# Patient Record
Sex: MALE | Race: WHITE | NOT HISPANIC OR LATINO | Employment: OTHER | URBAN - METROPOLITAN AREA
[De-identification: names, ages, dates, MRNs, and addresses within clinical notes are randomized per-mention and may not be internally consistent; named-entity substitution may affect disease eponyms.]

---

## 2023-07-13 ENCOUNTER — TELEPHONE (OUTPATIENT)
Dept: PRIMARY CARE | Facility: CLINIC | Age: 73
End: 2023-07-13
Payer: MEDICARE

## 2023-07-13 DIAGNOSIS — G80.9 CEREBRAL PALSY, UNSPECIFIED TYPE (MULTI): Primary | ICD-10-CM

## 2023-11-08 PROBLEM — D11.9 BASAL CELL ADENOMA: Status: ACTIVE | Noted: 2023-11-08

## 2023-11-08 PROBLEM — G80.9 CEREBRAL PALSY (MULTI): Status: ACTIVE | Noted: 2023-11-08

## 2023-11-08 PROBLEM — M17.11 PRIMARY OSTEOARTHRITIS OF RIGHT KNEE: Status: ACTIVE | Noted: 2023-11-08

## 2023-11-08 PROBLEM — Z85.828 HISTORY OF MOHS MICROGRAPHIC SURGERY FOR SKIN CANCER: Status: ACTIVE | Noted: 2023-11-08

## 2023-11-08 PROBLEM — Z98.890 HISTORY OF MOHS MICROGRAPHIC SURGERY FOR SKIN CANCER: Status: ACTIVE | Noted: 2023-11-08

## 2023-11-08 PROBLEM — M75.21 BICEPS TENDINITIS OF BOTH SHOULDERS: Status: ACTIVE | Noted: 2023-11-08

## 2023-11-08 PROBLEM — J02.9 ACUTE PHARYNGITIS: Status: ACTIVE | Noted: 2023-11-08

## 2023-11-08 PROBLEM — M75.22 BICEPS TENDINITIS OF BOTH SHOULDERS: Status: ACTIVE | Noted: 2023-11-08

## 2023-11-08 PROBLEM — H91.93 BILATERAL HEARING LOSS: Status: ACTIVE | Noted: 2023-11-08

## 2023-11-08 PROBLEM — S01.80XA: Status: ACTIVE | Noted: 2023-11-08

## 2023-11-08 PROBLEM — J34.2 DEVIATED NASAL SEPTUM: Status: ACTIVE | Noted: 2023-11-08

## 2023-11-08 PROBLEM — M54.2 NECK PAIN: Status: ACTIVE | Noted: 2023-11-08

## 2023-11-08 PROBLEM — S02.2XXA CLOSED FRACTURE OF NASAL BONES: Status: ACTIVE | Noted: 2023-11-08

## 2023-11-08 PROBLEM — E78.5 BORDERLINE HYPERLIPIDEMIA: Status: ACTIVE | Noted: 2023-11-08

## 2023-11-08 PROBLEM — M54.50 LOW BACK PAIN: Status: ACTIVE | Noted: 2023-11-08

## 2023-11-08 PROBLEM — M19.041 OSTEOARTHRITIS OF FINGER OF RIGHT HAND: Status: ACTIVE | Noted: 2023-11-08

## 2023-11-09 ENCOUNTER — OFFICE VISIT (OUTPATIENT)
Dept: PRIMARY CARE | Facility: CLINIC | Age: 73
End: 2023-11-09
Payer: MEDICARE

## 2023-11-09 VITALS
SYSTOLIC BLOOD PRESSURE: 132 MMHG | HEIGHT: 68 IN | TEMPERATURE: 97.6 F | DIASTOLIC BLOOD PRESSURE: 78 MMHG | HEART RATE: 70 BPM | BODY MASS INDEX: 17.98 KG/M2 | OXYGEN SATURATION: 98 % | WEIGHT: 118.6 LBS

## 2023-11-09 DIAGNOSIS — N40.0 BENIGN PROSTATIC HYPERPLASIA WITHOUT LOWER URINARY TRACT SYMPTOMS: ICD-10-CM

## 2023-11-09 DIAGNOSIS — Z00.00 MEDICARE ANNUAL WELLNESS VISIT, SUBSEQUENT: Primary | ICD-10-CM

## 2023-11-09 DIAGNOSIS — E78.5 BORDERLINE HYPERLIPIDEMIA: ICD-10-CM

## 2023-11-09 DIAGNOSIS — E46 PROTEIN-CALORIE MALNUTRITION, UNSPECIFIED SEVERITY (MULTI): ICD-10-CM

## 2023-11-09 DIAGNOSIS — G80.9 CEREBRAL PALSY, UNSPECIFIED TYPE (MULTI): ICD-10-CM

## 2023-11-09 LAB
ALBUMIN SERPL BCP-MCNC: 4.4 G/DL (ref 3.4–5)
ALP SERPL-CCNC: 113 U/L (ref 33–136)
ALT SERPL W P-5'-P-CCNC: 23 U/L (ref 10–52)
ANION GAP SERPL CALC-SCNC: 14 MMOL/L (ref 10–20)
AST SERPL W P-5'-P-CCNC: 20 U/L (ref 9–39)
BASOPHILS # BLD AUTO: 0.06 X10*3/UL (ref 0–0.1)
BASOPHILS NFR BLD AUTO: 0.7 %
BILIRUB SERPL-MCNC: 1 MG/DL (ref 0–1.2)
BUN SERPL-MCNC: 15 MG/DL (ref 6–23)
CALCIUM SERPL-MCNC: 10 MG/DL (ref 8.6–10.6)
CHLORIDE SERPL-SCNC: 102 MMOL/L (ref 98–107)
CHOLEST SERPL-MCNC: 173 MG/DL (ref 0–199)
CHOLESTEROL/HDL RATIO: 2.9
CO2 SERPL-SCNC: 30 MMOL/L (ref 21–32)
CREAT SERPL-MCNC: 0.92 MG/DL (ref 0.5–1.3)
EOSINOPHIL # BLD AUTO: 0.13 X10*3/UL (ref 0–0.4)
EOSINOPHIL NFR BLD AUTO: 1.4 %
ERYTHROCYTE [DISTWIDTH] IN BLOOD BY AUTOMATED COUNT: 12.3 % (ref 11.5–14.5)
GFR SERPL CREATININE-BSD FRML MDRD: 87 ML/MIN/1.73M*2
GLUCOSE SERPL-MCNC: 78 MG/DL (ref 74–99)
HCT VFR BLD AUTO: 50.9 % (ref 41–52)
HCV AB SER QL: NONREACTIVE
HDLC SERPL-MCNC: 59.4 MG/DL
HGB BLD-MCNC: 17 G/DL (ref 13.5–17.5)
IMM GRANULOCYTES # BLD AUTO: 0.06 X10*3/UL (ref 0–0.5)
IMM GRANULOCYTES NFR BLD AUTO: 0.7 % (ref 0–0.9)
LDLC SERPL CALC-MCNC: 103 MG/DL
LYMPHOCYTES # BLD AUTO: 2.73 X10*3/UL (ref 0.8–3)
LYMPHOCYTES NFR BLD AUTO: 30.3 %
MCH RBC QN AUTO: 33.1 PG (ref 26–34)
MCHC RBC AUTO-ENTMCNC: 33.4 G/DL (ref 32–36)
MCV RBC AUTO: 99 FL (ref 80–100)
MONOCYTES # BLD AUTO: 0.64 X10*3/UL (ref 0.05–0.8)
MONOCYTES NFR BLD AUTO: 7.1 %
NEUTROPHILS # BLD AUTO: 5.38 X10*3/UL (ref 1.6–5.5)
NEUTROPHILS NFR BLD AUTO: 59.8 %
NON HDL CHOLESTEROL: 114 MG/DL (ref 0–149)
NRBC BLD-RTO: 0 /100 WBCS (ref 0–0)
PLATELET # BLD AUTO: 218 X10*3/UL (ref 150–450)
POTASSIUM SERPL-SCNC: 4.5 MMOL/L (ref 3.5–5.3)
PROT SERPL-MCNC: 7.4 G/DL (ref 6.4–8.2)
PSA SERPL-MCNC: 1.87 NG/ML
RBC # BLD AUTO: 5.14 X10*6/UL (ref 4.5–5.9)
SODIUM SERPL-SCNC: 141 MMOL/L (ref 136–145)
TRIGL SERPL-MCNC: 51 MG/DL (ref 0–149)
VLDL: 10 MG/DL (ref 0–40)
WBC # BLD AUTO: 9 X10*3/UL (ref 4.4–11.3)

## 2023-11-09 PROCEDURE — 1160F RVW MEDS BY RX/DR IN RCRD: CPT | Performed by: FAMILY MEDICINE

## 2023-11-09 PROCEDURE — 81001 URINALYSIS AUTO W/SCOPE: CPT

## 2023-11-09 PROCEDURE — 1125F AMNT PAIN NOTED PAIN PRSNT: CPT | Performed by: FAMILY MEDICINE

## 2023-11-09 PROCEDURE — 1036F TOBACCO NON-USER: CPT | Performed by: FAMILY MEDICINE

## 2023-11-09 PROCEDURE — 84153 ASSAY OF PSA TOTAL: CPT

## 2023-11-09 PROCEDURE — 1170F FXNL STATUS ASSESSED: CPT | Performed by: FAMILY MEDICINE

## 2023-11-09 PROCEDURE — 99397 PER PM REEVAL EST PAT 65+ YR: CPT | Performed by: FAMILY MEDICINE

## 2023-11-09 PROCEDURE — 80053 COMPREHEN METABOLIC PANEL: CPT

## 2023-11-09 PROCEDURE — 99497 ADVNCD CARE PLAN 30 MIN: CPT | Performed by: FAMILY MEDICINE

## 2023-11-09 PROCEDURE — 80061 LIPID PANEL: CPT

## 2023-11-09 PROCEDURE — G0439 PPPS, SUBSEQ VISIT: HCPCS | Performed by: FAMILY MEDICINE

## 2023-11-09 PROCEDURE — 86803 HEPATITIS C AB TEST: CPT

## 2023-11-09 PROCEDURE — 1159F MED LIST DOCD IN RCRD: CPT | Performed by: FAMILY MEDICINE

## 2023-11-09 PROCEDURE — 85025 COMPLETE CBC W/AUTO DIFF WBC: CPT

## 2023-11-09 PROCEDURE — 1158F ADVNC CARE PLAN TLK DOCD: CPT | Performed by: FAMILY MEDICINE

## 2023-11-09 PROCEDURE — 36415 COLL VENOUS BLD VENIPUNCTURE: CPT

## 2023-11-09 RX ORDER — SYRINGE-NEEDLE,INSULIN,0.5 ML 28GX1/2"
SYRINGE, EMPTY DISPOSABLE MISCELLANEOUS
COMMUNITY
Start: 2023-10-19 | End: 2023-11-09 | Stop reason: WASHOUT

## 2023-11-09 RX ORDER — BENZONATATE 200 MG/1
CAPSULE ORAL
COMMUNITY
Start: 2023-10-19 | End: 2023-11-09 | Stop reason: WASHOUT

## 2023-11-09 SDOH — ECONOMIC STABILITY: FOOD INSECURITY: WITHIN THE PAST 12 MONTHS, THE FOOD YOU BOUGHT JUST DIDN'T LAST AND YOU DIDN'T HAVE MONEY TO GET MORE.: NEVER TRUE

## 2023-11-09 SDOH — ECONOMIC STABILITY: HOUSING INSECURITY
IN THE LAST 12 MONTHS, WAS THERE A TIME WHEN YOU DID NOT HAVE A STEADY PLACE TO SLEEP OR SLEPT IN A SHELTER (INCLUDING NOW)?: NO

## 2023-11-09 SDOH — ECONOMIC STABILITY: FOOD INSECURITY: WITHIN THE PAST 12 MONTHS, YOU WORRIED THAT YOUR FOOD WOULD RUN OUT BEFORE YOU GOT MONEY TO BUY MORE.: NEVER TRUE

## 2023-11-09 SDOH — ECONOMIC STABILITY: INCOME INSECURITY: IN THE LAST 12 MONTHS, WAS THERE A TIME WHEN YOU WERE NOT ABLE TO PAY THE MORTGAGE OR RENT ON TIME?: NO

## 2023-11-09 SDOH — ECONOMIC STABILITY: TRANSPORTATION INSECURITY
IN THE PAST 12 MONTHS, HAS LACK OF TRANSPORTATION KEPT YOU FROM MEETINGS, WORK, OR FROM GETTING THINGS NEEDED FOR DAILY LIVING?: NO

## 2023-11-09 SDOH — ECONOMIC STABILITY: TRANSPORTATION INSECURITY
IN THE PAST 12 MONTHS, HAS THE LACK OF TRANSPORTATION KEPT YOU FROM MEDICAL APPOINTMENTS OR FROM GETTING MEDICATIONS?: NO

## 2023-11-09 ASSESSMENT — ANXIETY QUESTIONNAIRES
6. BECOMING EASILY ANNOYED OR IRRITABLE: NOT AT ALL
2. NOT BEING ABLE TO STOP OR CONTROL WORRYING: NOT AT ALL
7. FEELING AFRAID AS IF SOMETHING AWFUL MIGHT HAPPEN: NOT AT ALL
5. BEING SO RESTLESS THAT IT IS HARD TO SIT STILL: NOT AT ALL
3. WORRYING TOO MUCH ABOUT DIFFERENT THINGS: NOT AT ALL
GAD7 TOTAL SCORE: 0
1. FEELING NERVOUS, ANXIOUS, OR ON EDGE: NOT AT ALL
4. TROUBLE RELAXING: NOT AT ALL

## 2023-11-09 ASSESSMENT — ACTIVITIES OF DAILY LIVING (ADL)
FEEDING YOURSELF: INDEPENDENT
JUDGMENT_ADEQUATE_SAFELY_COMPLETE_DAILY_ACTIVITIES: YES
PATIENT'S MEMORY ADEQUATE TO SAFELY COMPLETE DAILY ACTIVITIES?: YES
PREPARING MEALS: INDEPENDENT
USING TELEPHONE: INDEPENDENT
ADEQUATE_TO_COMPLETE_ADL: YES
TOILETING: INDEPENDENT
DRESSING: INDEPENDENT
BATHING: INDEPENDENT
GROOMING: INDEPENDENT
USING TRANSPORTATION: INDEPENDENT
FEEDING: INDEPENDENT
DOING HOUSEWORK: INDEPENDENT
EATING: INDEPENDENT
JUDGMENT_ADEQUATE_SAFELY_COMPLETE_DAILY_ACTIVITIES: YES
ADEQUATE_TO_COMPLETE_ADL: YES
TAKING MEDICATION: INDEPENDENT
NEEDS ASSISTANCE WITH FOOD: INDEPENDENT
TOILETING: INDEPENDENT
MANAGING FINANCES: INDEPENDENT
BATHING: INDEPENDENT
STIL DRIVING: YES
GROCERY SHOPPING: INDEPENDENT
DRESSING YOURSELF: INDEPENDENT
WALKS IN HOME: INDEPENDENT

## 2023-11-09 ASSESSMENT — ENCOUNTER SYMPTOMS
CONSTITUTIONAL NEGATIVE: 1
NEUROLOGICAL NEGATIVE: 1
RESPIRATORY NEGATIVE: 1
LOSS OF SENSATION IN FEET: 0
MUSCULOSKELETAL NEGATIVE: 1
NERVOUS/ANXIOUS: 1
ENDOCRINE NEGATIVE: 1
DEPRESSION: 0
OCCASIONAL FEELINGS OF UNSTEADINESS: 0
CARDIOVASCULAR NEGATIVE: 1
GASTROINTESTINAL NEGATIVE: 1

## 2023-11-09 ASSESSMENT — SOCIAL DETERMINANTS OF HEALTH (SDOH)
WITHIN THE LAST YEAR, HAVE TO BEEN RAPED OR FORCED TO HAVE ANY KIND OF SEXUAL ACTIVITY BY YOUR PARTNER OR EX-PARTNER?: NO
WITHIN THE LAST YEAR, HAVE YOU BEEN HUMILIATED OR EMOTIONALLY ABUSED IN OTHER WAYS BY YOUR PARTNER OR EX-PARTNER?: NO
HOW HARD IS IT FOR YOU TO PAY FOR THE VERY BASICS LIKE FOOD, HOUSING, MEDICAL CARE, AND HEATING?: NOT HARD AT ALL
WITHIN THE LAST YEAR, HAVE YOU BEEN AFRAID OF YOUR PARTNER OR EX-PARTNER?: NO
WITHIN THE LAST YEAR, HAVE YOU BEEN KICKED, HIT, SLAPPED, OR OTHERWISE PHYSICALLY HURT BY YOUR PARTNER OR EX-PARTNER?: NO
IN THE PAST 12 MONTHS, HAS THE ELECTRIC, GAS, OIL, OR WATER COMPANY THREATENED TO SHUT OFF SERVICE IN YOUR HOME?: NO

## 2023-11-09 ASSESSMENT — GERIATRIC MINI NUTRITIONAL ASSESSMENT (MNA)
D HAS SUFFERED PSYCHOLOGICAL STRESS OR ACUTE DISEASE IN THE PAST 3 MONTHS?: NO
E NEUROPSYCHOLOGICAL PROBLEMS: NO PSYCHOLOGICAL PROBLEMS
C GENERAL MOBILITY: GOES OUT
B WEIGHT LOSS DURING THE LAST 3 MONTHS: NO WEIGHT LOSS
A HAS FOOD INTAKE DECLINED OVER THE PAST 3 MONTHS DUE TO LOSS OF APPETITE, DIGESTIVE PROBLEMS, CHEWING OR SWALLOWING DIFFICULTIES?: NO DECREASE IN FOOD INTAKE

## 2023-11-09 ASSESSMENT — PATIENT HEALTH QUESTIONNAIRE - PHQ9
2. FEELING DOWN, DEPRESSED OR HOPELESS: NOT AT ALL
1. LITTLE INTEREST OR PLEASURE IN DOING THINGS: NOT AT ALL
SUM OF ALL RESPONSES TO PHQ9 QUESTIONS 1 & 2: 0

## 2023-11-09 ASSESSMENT — LIFESTYLE VARIABLES
HOW OFTEN DO YOU HAVE SIX OR MORE DRINKS ON ONE OCCASION: NEVER
SKIP TO QUESTIONS 9-10: 1
HOW MANY STANDARD DRINKS CONTAINING ALCOHOL DO YOU HAVE ON A TYPICAL DAY: PATIENT DOES NOT DRINK
HOW OFTEN DO YOU HAVE A DRINK CONTAINING ALCOHOL: NEVER
AUDIT-C TOTAL SCORE: 0

## 2023-11-09 ASSESSMENT — PAIN SCALES - GENERAL: PAINLEVEL: 3

## 2023-11-09 NOTE — ACP (ADVANCE CARE PLANNING)
Confirming Previous Code Status:   Advance Care Planning Note     Discussion Date: 11/09/23   Discussion Participants: patient    The patient wishes to discuss Advance Care Planning today and the following is a brief summary of our discussion.     Patient has capacity to make their own medical decisions: Yes  Health Care Agent/Surrogate Decision Maker documented in chart: Yes    Documents on file and valid:  Advance Directive/Living Will: Yes   Health Care Power of : Yes  Other: none    Communication of Medical Status/Prognosis:   yes     Communication of Treatment Goals/Options:   no     Treatment Decisions  Goals of Care: survival is paramount regardless of prognosis, treatment outcome, or burden   yes  Follow Up Plan  no  Team Members  myself  Time Statement: Total face to face time spent on advance care planning was 16 minutes with 16 minutes spent in counseling, including the explanation.    Dewey Cruz,   11/9/2023 1:10 PM

## 2023-11-09 NOTE — PROGRESS NOTES
"Subjective   Patient ID: Josh Kessler is a 73 y.o. male who presents for Annual Exam (Annual medicare wellness fasting bw).    HPI     Review of Systems   Constitutional: Negative.    HENT: Negative.     Respiratory: Negative.     Cardiovascular: Negative.    Gastrointestinal: Negative.    Endocrine: Negative.    Genitourinary: Negative.    Musculoskeletal: Negative.    Neurological: Negative.         CP   Psychiatric/Behavioral:  The patient is nervous/anxious.         Wife         Objective   /78 (BP Location: Left arm)   Pulse 70   Temp 36.4 °C (97.6 °F) (Temporal)   Ht 1.727 m (5' 8\")   Wt 53.8 kg (118 lb 9.6 oz)   SpO2 98%   BMI 18.03 kg/m²     Physical Exam  Vitals and nursing note reviewed.   HENT:      Right Ear: Tympanic membrane normal.      Left Ear: Tympanic membrane normal.      Ears:      Comments: Bilat hearing aids     Mouth/Throat:      Pharynx: Oropharynx is clear.   Cardiovascular:      Rate and Rhythm: Normal rate and regular rhythm.      Pulses: Normal pulses.      Heart sounds: Normal heart sounds.   Pulmonary:      Breath sounds: Normal breath sounds.   Abdominal:      Palpations: Abdomen is soft.   Musculoskeletal:         General: Normal range of motion.   Neurological:      Mental Status: He is oriented to person, place, and time.   Psychiatric:         Mood and Affect: Mood normal.         Behavior: Behavior normal.         Assessment/Plan patient seen here for an annual Medicare wellness exam.  We reviewed his questionnaire he is agreeable to his responses.  We did discuss advanced directives.  He does have some increased anxiety lately since his wife passed away this past September.  We will see him back in a year  Problem List Items Addressed This Visit             ICD-10-CM    Borderline hyperlipidemia E78.5    Relevant Orders    Lipid Panel    CBC and Auto Differential    Comprehensive Metabolic Panel    Microscopic Only, Urine    Cerebral palsy (CMS/[facility]) G80.9 "    Protein-calorie malnutrition, unspecified severity (CMS/HCC) E46     Other Visit Diagnoses         Codes    Medicare annual wellness visit, subsequent    -  Primary Z00.00    Relevant Orders    Hepatitis C antibody    Benign prostatic hyperplasia without lower urinary tract symptoms     N40.0    Relevant Orders    Prostate Specific Antigen

## 2023-11-10 LAB
RBC #/AREA URNS AUTO: NORMAL /HPF
WBC #/AREA URNS AUTO: NORMAL /HPF

## 2024-01-22 ENCOUNTER — APPOINTMENT (OUTPATIENT)
Dept: RADIOLOGY | Facility: HOSPITAL | Age: 74
DRG: 065 | End: 2024-01-22
Payer: MEDICARE

## 2024-01-22 ENCOUNTER — HOSPITAL ENCOUNTER (INPATIENT)
Facility: HOSPITAL | Age: 74
LOS: 2 days | Discharge: HOME | DRG: 065 | End: 2024-01-24
Attending: STUDENT IN AN ORGANIZED HEALTH CARE EDUCATION/TRAINING PROGRAM | Admitting: STUDENT IN AN ORGANIZED HEALTH CARE EDUCATION/TRAINING PROGRAM
Payer: MEDICARE

## 2024-01-22 ENCOUNTER — APPOINTMENT (OUTPATIENT)
Dept: CARDIOLOGY | Facility: HOSPITAL | Age: 74
DRG: 065 | End: 2024-01-22
Payer: MEDICARE

## 2024-01-22 DIAGNOSIS — I63.89 OTHER CEREBRAL INFARCTION (MULTI): ICD-10-CM

## 2024-01-22 DIAGNOSIS — I63.9 CEREBROVASCULAR ACCIDENT (CVA), UNSPECIFIED MECHANISM (MULTI): Primary | ICD-10-CM

## 2024-01-22 DIAGNOSIS — G45.9 TRANSIENT CEREBRAL ISCHEMIA, UNSPECIFIED TYPE: ICD-10-CM

## 2024-01-22 DIAGNOSIS — I48.0 AF (PAROXYSMAL ATRIAL FIBRILLATION) (MULTI): ICD-10-CM

## 2024-01-22 LAB
ALBUMIN SERPL BCP-MCNC: 4.5 G/DL (ref 3.4–5)
ALP SERPL-CCNC: 105 U/L (ref 33–136)
ALT SERPL W P-5'-P-CCNC: 26 U/L (ref 10–52)
ANION GAP SERPL CALC-SCNC: 11 MMOL/L (ref 10–20)
APTT PPP: 29 SECONDS (ref 27–38)
AST SERPL W P-5'-P-CCNC: 22 U/L (ref 9–39)
BASOPHILS # BLD AUTO: 0.07 X10*3/UL (ref 0–0.1)
BASOPHILS NFR BLD AUTO: 0.7 %
BILIRUB SERPL-MCNC: 1 MG/DL (ref 0–1.2)
BUN SERPL-MCNC: 21 MG/DL (ref 6–23)
CALCIUM SERPL-MCNC: 9.8 MG/DL (ref 8.6–10.3)
CARDIAC TROPONIN I PNL SERPL HS: 6 NG/L (ref 0–20)
CHLORIDE SERPL-SCNC: 101 MMOL/L (ref 98–107)
CHOLEST SERPL-MCNC: 171 MG/DL (ref 0–199)
CHOLESTEROL/HDL RATIO: 3
CO2 SERPL-SCNC: 29 MMOL/L (ref 21–32)
CREAT SERPL-MCNC: 1.06 MG/DL (ref 0.5–1.3)
EGFRCR SERPLBLD CKD-EPI 2021: 73 ML/MIN/1.73M*2
EOSINOPHIL # BLD AUTO: 0.08 X10*3/UL (ref 0–0.4)
EOSINOPHIL NFR BLD AUTO: 0.8 %
ERYTHROCYTE [DISTWIDTH] IN BLOOD BY AUTOMATED COUNT: 11.9 % (ref 11.5–14.5)
EST. AVERAGE GLUCOSE BLD GHB EST-MCNC: 97 MG/DL
GLUCOSE BLD MANUAL STRIP-MCNC: 89 MG/DL (ref 74–99)
GLUCOSE SERPL-MCNC: 134 MG/DL (ref 74–99)
HBA1C MFR BLD: 5 %
HCT VFR BLD AUTO: 47.5 % (ref 41–52)
HDLC SERPL-MCNC: 56.5 MG/DL
HGB BLD-MCNC: 16.4 G/DL (ref 13.5–17.5)
IMM GRANULOCYTES # BLD AUTO: 0.01 X10*3/UL (ref 0–0.5)
IMM GRANULOCYTES NFR BLD AUTO: 0.1 % (ref 0–0.9)
INR PPP: 1 (ref 0.9–1.1)
LDLC SERPL CALC-MCNC: 102 MG/DL
LYMPHOCYTES # BLD AUTO: 1.85 X10*3/UL (ref 0.8–3)
LYMPHOCYTES NFR BLD AUTO: 19 %
MCH RBC QN AUTO: 33.4 PG (ref 26–34)
MCHC RBC AUTO-ENTMCNC: 34.5 G/DL (ref 32–36)
MCV RBC AUTO: 97 FL (ref 80–100)
MONOCYTES # BLD AUTO: 0.78 X10*3/UL (ref 0.05–0.8)
MONOCYTES NFR BLD AUTO: 8 %
NEUTROPHILS # BLD AUTO: 6.96 X10*3/UL (ref 1.6–5.5)
NEUTROPHILS NFR BLD AUTO: 71.4 %
NON HDL CHOLESTEROL: 115 MG/DL (ref 0–149)
NRBC BLD-RTO: 0 /100 WBCS (ref 0–0)
PLATELET # BLD AUTO: 205 X10*3/UL (ref 150–450)
POTASSIUM SERPL-SCNC: 3.8 MMOL/L (ref 3.5–5.3)
PROT SERPL-MCNC: 7.4 G/DL (ref 6.4–8.2)
PROTHROMBIN TIME: 11.6 SECONDS (ref 9.8–12.8)
RBC # BLD AUTO: 4.91 X10*6/UL (ref 4.5–5.9)
SODIUM SERPL-SCNC: 137 MMOL/L (ref 136–145)
TRIGL SERPL-MCNC: 65 MG/DL (ref 0–149)
VLDL: 13 MG/DL (ref 0–40)
WBC # BLD AUTO: 9.8 X10*3/UL (ref 4.4–11.3)

## 2024-01-22 PROCEDURE — 71045 X-RAY EXAM CHEST 1 VIEW: CPT | Mod: FR

## 2024-01-22 PROCEDURE — 70544 MR ANGIOGRAPHY HEAD W/O DYE: CPT | Performed by: STUDENT IN AN ORGANIZED HEALTH CARE EDUCATION/TRAINING PROGRAM

## 2024-01-22 PROCEDURE — 99223 1ST HOSP IP/OBS HIGH 75: CPT | Performed by: STUDENT IN AN ORGANIZED HEALTH CARE EDUCATION/TRAINING PROGRAM

## 2024-01-22 PROCEDURE — 84484 ASSAY OF TROPONIN QUANT: CPT | Performed by: STUDENT IN AN ORGANIZED HEALTH CARE EDUCATION/TRAINING PROGRAM

## 2024-01-22 PROCEDURE — 70551 MRI BRAIN STEM W/O DYE: CPT | Performed by: STUDENT IN AN ORGANIZED HEALTH CARE EDUCATION/TRAINING PROGRAM

## 2024-01-22 PROCEDURE — 2500000001 HC RX 250 WO HCPCS SELF ADMINISTERED DRUGS (ALT 637 FOR MEDICARE OP): Performed by: NURSE PRACTITIONER

## 2024-01-22 PROCEDURE — 70547 MR ANGIOGRAPHY NECK W/O DYE: CPT | Performed by: STUDENT IN AN ORGANIZED HEALTH CARE EDUCATION/TRAINING PROGRAM

## 2024-01-22 PROCEDURE — 2500000004 HC RX 250 GENERAL PHARMACY W/ HCPCS (ALT 636 FOR OP/ED)

## 2024-01-22 PROCEDURE — 2060000001 HC INTERMEDIATE ICU ROOM DAILY

## 2024-01-22 PROCEDURE — 83036 HEMOGLOBIN GLYCOSYLATED A1C: CPT

## 2024-01-22 PROCEDURE — 70547 MR ANGIOGRAPHY NECK W/O DYE: CPT | Mod: 59

## 2024-01-22 PROCEDURE — 82947 ASSAY GLUCOSE BLOOD QUANT: CPT

## 2024-01-22 PROCEDURE — 70551 MRI BRAIN STEM W/O DYE: CPT

## 2024-01-22 PROCEDURE — 99223 1ST HOSP IP/OBS HIGH 75: CPT | Performed by: PSYCHIATRY & NEUROLOGY

## 2024-01-22 PROCEDURE — 80053 COMPREHEN METABOLIC PANEL: CPT | Performed by: STUDENT IN AN ORGANIZED HEALTH CARE EDUCATION/TRAINING PROGRAM

## 2024-01-22 PROCEDURE — 70450 CT HEAD/BRAIN W/O DYE: CPT | Performed by: RADIOLOGY

## 2024-01-22 PROCEDURE — 99285 EMERGENCY DEPT VISIT HI MDM: CPT | Performed by: STUDENT IN AN ORGANIZED HEALTH CARE EDUCATION/TRAINING PROGRAM

## 2024-01-22 PROCEDURE — 99497 ADVNCD CARE PLAN 30 MIN: CPT | Performed by: STUDENT IN AN ORGANIZED HEALTH CARE EDUCATION/TRAINING PROGRAM

## 2024-01-22 PROCEDURE — 80061 LIPID PANEL: CPT

## 2024-01-22 PROCEDURE — 72125 CT NECK SPINE W/O DYE: CPT

## 2024-01-22 PROCEDURE — 70544 MR ANGIOGRAPHY HEAD W/O DYE: CPT | Mod: 59

## 2024-01-22 PROCEDURE — 85610 PROTHROMBIN TIME: CPT | Performed by: STUDENT IN AN ORGANIZED HEALTH CARE EDUCATION/TRAINING PROGRAM

## 2024-01-22 PROCEDURE — 85025 COMPLETE CBC W/AUTO DIFF WBC: CPT | Performed by: STUDENT IN AN ORGANIZED HEALTH CARE EDUCATION/TRAINING PROGRAM

## 2024-01-22 PROCEDURE — 36415 COLL VENOUS BLD VENIPUNCTURE: CPT | Performed by: STUDENT IN AN ORGANIZED HEALTH CARE EDUCATION/TRAINING PROGRAM

## 2024-01-22 PROCEDURE — 93005 ELECTROCARDIOGRAM TRACING: CPT

## 2024-01-22 PROCEDURE — 70450 CT HEAD/BRAIN W/O DYE: CPT

## 2024-01-22 PROCEDURE — 85730 THROMBOPLASTIN TIME PARTIAL: CPT | Performed by: STUDENT IN AN ORGANIZED HEALTH CARE EDUCATION/TRAINING PROGRAM

## 2024-01-22 PROCEDURE — 2500000001 HC RX 250 WO HCPCS SELF ADMINISTERED DRUGS (ALT 637 FOR MEDICARE OP): Performed by: STUDENT IN AN ORGANIZED HEALTH CARE EDUCATION/TRAINING PROGRAM

## 2024-01-22 PROCEDURE — 72125 CT NECK SPINE W/O DYE: CPT | Performed by: RADIOLOGY

## 2024-01-22 RX ORDER — NAPROXEN SODIUM 220 MG/1
81 TABLET, FILM COATED ORAL DAILY
Status: DISCONTINUED | OUTPATIENT
Start: 2024-01-22 | End: 2024-01-24 | Stop reason: HOSPADM

## 2024-01-22 RX ORDER — HYDRALAZINE HYDROCHLORIDE 20 MG/ML
10 INJECTION INTRAMUSCULAR; INTRAVENOUS
Status: DISCONTINUED | OUTPATIENT
Start: 2024-01-22 | End: 2024-01-22

## 2024-01-22 RX ORDER — ATORVASTATIN CALCIUM 40 MG/1
40 TABLET, FILM COATED ORAL NIGHTLY
Status: DISCONTINUED | OUTPATIENT
Start: 2024-01-22 | End: 2024-01-24 | Stop reason: HOSPADM

## 2024-01-22 RX ORDER — ACETAMINOPHEN 325 MG/1
650 TABLET ORAL EVERY 6 HOURS PRN
Status: DISCONTINUED | OUTPATIENT
Start: 2024-01-22 | End: 2024-01-24 | Stop reason: HOSPADM

## 2024-01-22 RX ORDER — CLOPIDOGREL BISULFATE 75 MG/1
75 TABLET ORAL DAILY
Status: DISCONTINUED | OUTPATIENT
Start: 2024-01-23 | End: 2024-01-22

## 2024-01-22 RX ORDER — HYDRALAZINE HYDROCHLORIDE 25 MG/1
25 TABLET, FILM COATED ORAL EVERY 6 HOURS PRN
Status: DISCONTINUED | OUTPATIENT
Start: 2024-01-24 | End: 2024-01-22

## 2024-01-22 RX ORDER — NAPROXEN SODIUM 220 MG/1
81 TABLET, FILM COATED ORAL DAILY
Status: DISCONTINUED | OUTPATIENT
Start: 2024-01-23 | End: 2024-01-22

## 2024-01-22 RX ORDER — ONDANSETRON HYDROCHLORIDE 2 MG/ML
4 INJECTION, SOLUTION INTRAVENOUS EVERY 6 HOURS PRN
Status: DISCONTINUED | OUTPATIENT
Start: 2024-01-22 | End: 2024-01-24 | Stop reason: HOSPADM

## 2024-01-22 RX ORDER — LABETALOL HYDROCHLORIDE 5 MG/ML
10 INJECTION, SOLUTION INTRAVENOUS EVERY 10 MIN PRN
Status: DISCONTINUED | OUTPATIENT
Start: 2024-01-22 | End: 2024-01-22

## 2024-01-22 RX ORDER — CLOPIDOGREL BISULFATE 75 MG/1
75 TABLET ORAL DAILY
Status: DISCONTINUED | OUTPATIENT
Start: 2024-01-22 | End: 2024-01-24 | Stop reason: HOSPADM

## 2024-01-22 RX ORDER — ENOXAPARIN SODIUM 100 MG/ML
40 INJECTION SUBCUTANEOUS EVERY 24 HOURS
Status: DISCONTINUED | OUTPATIENT
Start: 2024-01-22 | End: 2024-01-24 | Stop reason: HOSPADM

## 2024-01-22 RX ORDER — NAPROXEN SODIUM 220 MG/1
324 TABLET, FILM COATED ORAL ONCE
Status: DISCONTINUED | OUTPATIENT
Start: 2024-01-22 | End: 2024-01-22

## 2024-01-22 RX ORDER — NAPROXEN SODIUM 220 MG/1
81 TABLET, FILM COATED ORAL ONCE
Status: DISCONTINUED | OUTPATIENT
Start: 2024-01-22 | End: 2024-01-22

## 2024-01-22 RX ORDER — CLOPIDOGREL BISULFATE 75 MG/1
300 TABLET ORAL ONCE
Status: DISCONTINUED | OUTPATIENT
Start: 2024-01-22 | End: 2024-01-22

## 2024-01-22 RX ORDER — ROSUVASTATIN CALCIUM 40 MG/1
40 TABLET, COATED ORAL DAILY
Status: DISCONTINUED | OUTPATIENT
Start: 2024-01-22 | End: 2024-01-22

## 2024-01-22 RX ORDER — CLOPIDOGREL BISULFATE 75 MG/1
75 TABLET ORAL DAILY
Status: DISCONTINUED | OUTPATIENT
Start: 2024-01-22 | End: 2024-01-22

## 2024-01-22 RX ADMIN — ENOXAPARIN SODIUM 40 MG: 40 INJECTION SUBCUTANEOUS at 16:13

## 2024-01-22 RX ADMIN — ASPIRIN 81 MG 81 MG: 81 TABLET ORAL at 16:15

## 2024-01-22 RX ADMIN — CLOPIDOGREL 75 MG: 75 TABLET ORAL at 16:13

## 2024-01-22 RX ADMIN — ATORVASTATIN CALCIUM 40 MG: 40 TABLET, FILM COATED ORAL at 20:33

## 2024-01-22 SDOH — SOCIAL STABILITY: SOCIAL INSECURITY: WERE YOU ABLE TO COMPLETE ALL THE BEHAVIORAL HEALTH SCREENINGS?: YES

## 2024-01-22 SDOH — SOCIAL STABILITY: SOCIAL INSECURITY: HAVE YOU HAD THOUGHTS OF HARMING ANYONE ELSE?: NO

## 2024-01-22 SDOH — SOCIAL STABILITY: SOCIAL INSECURITY: ARE THERE ANY APPARENT SIGNS OF INJURIES/BEHAVIORS THAT COULD BE RELATED TO ABUSE/NEGLECT?: NO

## 2024-01-22 SDOH — SOCIAL STABILITY: SOCIAL INSECURITY: DO YOU FEEL ANYONE HAS EXPLOITED OR TAKEN ADVANTAGE OF YOU FINANCIALLY OR OF YOUR PERSONAL PROPERTY?: NO

## 2024-01-22 SDOH — SOCIAL STABILITY: SOCIAL INSECURITY: ARE YOU OR HAVE YOU BEEN THREATENED OR ABUSED PHYSICALLY, EMOTIONALLY, OR SEXUALLY BY ANYONE?: NO

## 2024-01-22 SDOH — SOCIAL STABILITY: SOCIAL INSECURITY: HAS ANYONE EVER THREATENED TO HURT YOUR FAMILY OR YOUR PETS?: NO

## 2024-01-22 SDOH — SOCIAL STABILITY: SOCIAL INSECURITY: ABUSE: ADULT

## 2024-01-22 SDOH — SOCIAL STABILITY: SOCIAL INSECURITY: DO YOU FEEL UNSAFE GOING BACK TO THE PLACE WHERE YOU ARE LIVING?: NO

## 2024-01-22 SDOH — SOCIAL STABILITY: SOCIAL INSECURITY: DOES ANYONE TRY TO KEEP YOU FROM HAVING/CONTACTING OTHER FRIENDS OR DOING THINGS OUTSIDE YOUR HOME?: NO

## 2024-01-22 ASSESSMENT — ACTIVITIES OF DAILY LIVING (ADL)
JUDGMENT_ADEQUATE_SAFELY_COMPLETE_DAILY_ACTIVITIES: YES
WALKS IN HOME: NEEDS ASSISTANCE
FEEDING YOURSELF: INDEPENDENT
ADEQUATE_TO_COMPLETE_ADL: YES
LACK_OF_TRANSPORTATION: NO
GROOMING: INDEPENDENT
DRESSING YOURSELF: INDEPENDENT
ASSISTIVE_DEVICE: WALKER
TOILETING: NEEDS ASSISTANCE
PATIENT'S MEMORY ADEQUATE TO SAFELY COMPLETE DAILY ACTIVITIES?: YES
HEARING - RIGHT EAR: FUNCTIONAL
BATHING: NEEDS ASSISTANCE
HEARING - LEFT EAR: FUNCTIONAL

## 2024-01-22 ASSESSMENT — LIFESTYLE VARIABLES
SUBSTANCE_ABUSE_PAST_12_MONTHS: NO
HOW OFTEN DO YOU HAVE A DRINK CONTAINING ALCOHOL: NEVER
AUDIT-C TOTAL SCORE: 0
PRESCIPTION_ABUSE_PAST_12_MONTHS: NO
HOW OFTEN DO YOU HAVE 6 OR MORE DRINKS ON ONE OCCASION: NEVER
HOW MANY STANDARD DRINKS CONTAINING ALCOHOL DO YOU HAVE ON A TYPICAL DAY: PATIENT DOES NOT DRINK
AUDIT-C TOTAL SCORE: 0
SKIP TO QUESTIONS 9-10: 1

## 2024-01-22 ASSESSMENT — COLUMBIA-SUICIDE SEVERITY RATING SCALE - C-SSRS
6. HAVE YOU EVER DONE ANYTHING, STARTED TO DO ANYTHING, OR PREPARED TO DO ANYTHING TO END YOUR LIFE?: NO
1. IN THE PAST MONTH, HAVE YOU WISHED YOU WERE DEAD OR WISHED YOU COULD GO TO SLEEP AND NOT WAKE UP?: NO
1. IN THE PAST MONTH, HAVE YOU WISHED YOU WERE DEAD OR WISHED YOU COULD GO TO SLEEP AND NOT WAKE UP?: NO
2. HAVE YOU ACTUALLY HAD ANY THOUGHTS OF KILLING YOURSELF?: NO
6. HAVE YOU EVER DONE ANYTHING, STARTED TO DO ANYTHING, OR PREPARED TO DO ANYTHING TO END YOUR LIFE?: NO
2. HAVE YOU ACTUALLY HAD ANY THOUGHTS OF KILLING YOURSELF?: NO

## 2024-01-22 ASSESSMENT — COGNITIVE AND FUNCTIONAL STATUS - GENERAL
CLIMB 3 TO 5 STEPS WITH RAILING: A LOT
DAILY ACTIVITIY SCORE: 23
MOVING TO AND FROM BED TO CHAIR: A LITTLE
CLIMB 3 TO 5 STEPS WITH RAILING: A LOT
STANDING UP FROM CHAIR USING ARMS: A LITTLE
DAILY ACTIVITIY SCORE: 22
PATIENT BASELINE BEDBOUND: NO
MOBILITY SCORE: 19
STANDING UP FROM CHAIR USING ARMS: A LITTLE
WALKING IN HOSPITAL ROOM: A LITTLE
HELP NEEDED FOR BATHING: A LITTLE
TOILETING: A LITTLE
WALKING IN HOSPITAL ROOM: A LOT
MOVING TO AND FROM BED TO CHAIR: A LITTLE
MOBILITY SCORE: 18
TOILETING: A LITTLE

## 2024-01-22 ASSESSMENT — PATIENT HEALTH QUESTIONNAIRE - PHQ9
1. LITTLE INTEREST OR PLEASURE IN DOING THINGS: NOT AT ALL
SUM OF ALL RESPONSES TO PHQ9 QUESTIONS 1 & 2: 0
2. FEELING DOWN, DEPRESSED OR HOPELESS: NOT AT ALL

## 2024-01-22 ASSESSMENT — PAIN SCALES - GENERAL
PAINLEVEL_OUTOF10: 0 - NO PAIN
PAINLEVEL_OUTOF10: 0 - NO PAIN

## 2024-01-22 ASSESSMENT — PAIN - FUNCTIONAL ASSESSMENT: PAIN_FUNCTIONAL_ASSESSMENT: 0-10

## 2024-01-22 NOTE — ED TRIAGE NOTES
Patient arrives from home with complaints of weakness to his right leg that comes and goes for the past week or so.  Patient states he has had multiple falls since then .  Patient also complains of slight numbness to his right thumb and side of face just by the corner of his lip.  Patient states he has a history of cerebral palsy as well.

## 2024-01-22 NOTE — ED PROVIDER NOTES
EMERGENCY DEPARTMENT ENCOUNTER      Pt Name: Josh Kessler  MRN: [id number]  Birthdate 1950  Date of evaluation: 1/22/2024  Provider: Andre Tatum DO    CHIEF COMPLAINT       Chief Complaint   Patient presents with    Tingling     Patient arrives from home with complaints of weakness to his right leg that comes and goes for the past week or so.  Patient states he has had multiple falls since then .  Patient also complains of slight numbness to his right thumb and side of face just by the corner of his lip.  Patient states he has a history of cerebral palsy as well.       HISTORY OF PRESENT ILLNESS    Josh Kessler is a 73 y.o. male who presents to the emergency department with Himself for multiple medical complaints.  He reports that he did have the worst headache of his life around [place] this resolved after a few days he currently does not have a headache.  At that time he was having flashes in both eyes as well.  This is also resolved.  He reports over the past week he has been having waxing and waning right lower extremity weakness as well as tingling throughout the right upper and right lower extremity and right facial structures.  He states the only current active symptoms are the tingling throughout the right side that has been ongoing since yesterday morning.  Denies any further associated symptoms at this time.  He does have a longstanding history of cerebral palsy which leaves him with right-sided weakness although this was unusual compared to his baseline.  States that he has been having regular falls at home secondary to the symptoms nothing in the past 24 hours.          Nursing Notes were reviewed.    REVIEW OF SYSTEMS     CONSTITUTIONAL: Denies fever, sweats, chills.   NEURO: Endorses weakness, numbness, tingling.  Denies headache.   HEENT: Denies sore throat, rhinorrhea, changes in vision.   CARDIO: Denies chest pain, palpitations.  PULM: Denies shortness of breath, cough.   GI: Denies  abdominal pain, nausea, vomiting, diarrhea, constipation, melena, hematochezia.  : Denies painful urination, frequency, hematuria.   MSK: Endorses falls.  SKIN: Denies rash, lesions.   ENDOCRINE: Denies unexpected weight-loss.   HEME: Denies bleeding disorder.     PAST MEDICAL HISTORY     Past Medical History:   Diagnosis Date    Cerebral palsy (CMS/HCC)     Other specified health status     No pertinent past medical history       SURGICAL HISTORY       Past Surgical History:   Procedure Laterality Date    OTHER SURGICAL HISTORY  04/24/2018    History Of Prior Surgery       ALLERGIES     Patient has no known allergies.    FAMILY HISTORY     No family history on file.     SOCIAL HISTORY       Social History     Socioeconomic History    Marital status:      Spouse name: Not on file    Number of children: Not on file    Years of education: Not on file    Highest education level: Not on file   Occupational History    Not on file   Tobacco Use    Smoking status: Never    Smokeless tobacco: Never   Vaping Use    Vaping Use: Never used   Substance and Sexual Activity    Alcohol use: Never    Drug use: Never    Sexual activity: Not on file   Other Topics Concern    Not on file   Social History Narrative    Not on file     Social Determinants of Health     Financial Resource Strain: Low Risk  (1/22/2024)    Overall Financial Resource Strain (CARDIA)     Difficulty of Paying Living Expenses: Not hard at all   Food Insecurity: No Food Insecurity (11/9/2023)    Hunger Vital Sign     Worried About Running Out of Food in the Last Year: Never true     Ran Out of Food in the Last Year: Never true   Transportation Needs: No Transportation Needs (1/22/2024)    PRAPARE - Transportation     Lack of Transportation (Medical): No     Lack of Transportation (Non-Medical): No   Physical Activity: Not on file   Stress: No Stress Concern Present (11/9/2023)    [place] [employer] - Occupational Stress  Questionnaire     Feeling of Stress : Not at all   Social Connections: Not on file   Intimate Partner Violence: Not At Risk (11/9/2023)    Humiliation, Afraid, Rape, and Kick questionnaire     Fear of Current or Ex-Partner: No     Emotionally Abused: No     Physically Abused: No     Sexually Abused: No   Housing Stability: Low Risk  (1/22/2024)    Housing Stability Vital Sign     Unable to Pay for Housing in the Last Year: No     Number of Places Lived in the Last Year: 1     Unstable Housing in the Last Year: No       PHYSICAL EXAM   VS: As documented in the triage note from today's date and EMR flowsheet were reviewed.  Gen: Well developed. No acute distress. Seated in bed. Appears nontoxic.  Alert and oriented to person time and place.  Skin: Warm. Dry. Intact. No rashes or lesions.  Scattered ecchymosis over bilateral forearms no skin tears or breaks.  No anatomical snuffbox tenderness.  Eyes: Pupils equally round and reactive to light. Clear sclera. EOMI.  HENT: Atraumatic appearance. Mucosal membranes moist. No oral lesions, uvula midline, airway patent.  TMs clear bilaterally nares clear bilaterally no cervical tenderness or step-offs trachea is midline.  CV: Regular rate and regular rhythm. S1, S2. No pedal edema. Warm extremities.  Resp: Nonlabored breathing Clear to auscultation bilaterally. No increased work of breathing.   GI: Soft and nontender. No rebound or guarding. Bowel sounds x4 present.   MSK: Symmetric muscle bulk. No joint swelling in the extremities. Compartments are soft. Neurovascularly intact x4 extremities. Radial pulses +2 equal bilaterally.  Pedal pulses +2 equal bilaterally.  No T or L-spine tenderness.  Neuro: Alert. CN II - XII intact. Speech fluent. Moving all extremities. No focal deficits. Gait normal with chronic gait instability secondary to CP.  NIH 0 Van negative.  Psych: Appropriate. [place].    DIAGNOSTIC RESULTS   RADIOLOGY:   Non-plain film images such as CT, Ultrasound and  MRI are read by the radiologist. Plain radiographic images are visualized and preliminarily interpreted by the emergency physician with the below findings: Chest x-ray without any acute cardiopulmonary findings.      Interpretation per the Radiologist below, if available at the time of this note:    XR chest 1 view   Final Result   No detectable active cardiopulmonary disease.   Signed by Andrea Amador MD      CT head wo IV contrast   Final Result   Interval development of a left thalamic infarct.        This was conveyed by phone to Dr. Tatum at 12:50 p.m..             MACRO:   none        Signed by: Caro Abreu 1/22/2024 12:50 PM   Dictation workstation:   [id number]      CT cervical spine wo IV contrast   Final Result   No acute bony abnormality.        Carotid artery calcifications.             MACRO:   None        Signed by: Caro Abreu 1/22/2024 12:52 PM   Dictation workstation:   [id number]      MR angio head wo IV contrast    (Results Pending)   MR angio neck wo IV contrast    (Results Pending)   MR brain wo IV contrast    (Results Pending)   Transthoracic Echo (TTE) Complete    (Results Pending)   Holter Or Event Cardiac Monitor    (Results Pending)         ED BEDSIDE ULTRASOUND:   Performed by ED Physician - none    LABS:  Labs Reviewed   CBC WITH AUTO DIFFERENTIAL - Abnormal       Result Value    WBC 9.8      nRBC 0.0      RBC 4.91      Hemoglobin 16.4      Hematocrit 47.5      MCV 97      MCH 33.4      MCHC 34.5      RDW 11.9      Platelets 205      Neutrophils % 71.4      Immature Granulocytes %, Automated 0.1      Lymphocytes % 19.0      Monocytes % 8.0      Eosinophils % 0.8      Basophils % 0.7      Neutrophils Absolute 6.96 (*)     Immature Granulocytes Absolute, Automated 0.01      Lymphocytes Absolute 1.85      Monocytes Absolute 0.78      Eosinophils Absolute 0.08      Basophils Absolute 0.07     COMPREHENSIVE METABOLIC PANEL - Abnormal    Glucose 134 (*)     Sodium 137      Potassium 3.8       Chloride 101      Bicarbonate 29      Anion Gap 11      Urea Nitrogen 21      Creatinine 1.06      eGFR 73      Calcium 9.8      Albumin 4.5      Alkaline Phosphatase 105      Total Protein 7.4      AST 22      Bilirubin, Total 1.0      ALT 26     LIPID PANEL - Abnormal    Cholesterol 171      HDL-Cholesterol 56.5      Cholesterol/HDL Ratio 3.0      LDL Calculated 102 (*)     VLDL 13      Triglycerides 65      Non HDL Cholesterol 115     TROPONIN I, HIGH SENSITIVITY - Normal    Troponin I, High Sensitivity 6      Narrative:     Less than 99th percentile of normal range cutoff-  Female and children under 18 years old <14 ng/L; Male <21 ng/L: Negative  Repeat testing should be performed if clinically indicated.     Female and children under 18 years old 14-50 ng/L; Male 21-50 ng/L:  Consistent with possible cardiac damage and possible increased clinical   risk. Serial measurements may help to assess extent of myocardial damage.     >50 ng/L: Consistent with cardiac damage, increased clinical risk and  myocardial infarction. Serial measurements may help assess extent of   myocardial damage.      NOTE: Children less than 1 year old may have higher baseline troponin   levels and results should be interpreted in conjunction with the overall   clinical context.     NOTE: Troponin I testing is performed using a different   testing methodology at [facility] than at other   [facility]. Direct result comparisons should only   be made within the same method.   PROTIME-INR - Normal    Protime 11.6      INR 1.0     APTT - Normal    aPTT 29      Narrative:     The APTT is no longer used for monitoring Unfractionated Heparin Therapy. For monitoring Heparin Therapy, use the Heparin Assay.   HEMOGLOBIN A1C    Hemoglobin A1C 5.0      Estimated Average Glucose 97      Narrative:     Diagnosis of Diabetes-Adults  Non-Diabetic: < or = 5.6%  Increased risk for developing diabetes: 5.7-6.4%  Diagnostic of diabetes:  "> or = 6.5%    Monitoring of Diabetes  Age (y)....................... Therapeutic Goal (%)  Adults: >18.........................<7.0  Pediatrics: 13-18...................<7.5  Pediatrics: 7-12....................<8.0  Pediatrics: 0-6..................... 7.5-8.5    American Diabetes Association. Diabetes Care 33(S1), Jan 2010       POCT GLUCOSE METER   POCT GLUCOSE METER       All other labs were within normal range or not returned as of this dictation.    EMERGENCY DEPARTMENT COURSE/MDM:   Vitals:    Vitals:    01/22/24 1442 01/22/24 1509 01/22/24 1549 01/22/24 1937   BP: (!) 184/89 (!) 184/89 152/75 161/85   Pulse: 66 66  74   Resp: 20 20  18   Temp: 36.3 °C (97.3 °F) 36.3 °C (97.3 °F) 36.3 °C (97.3 °F) 36.6 °C (97.9 °F)   TempSrc: Temporal Temporal     SpO2: 97% 97% 95% 97%   Weight: 53.8 kg (118 lb 9.7 oz) 53.8 kg (118 lb 9.7 oz)     Height:  1.727 m (5' 7.99\")         I reviewed the patient's triage vitals and they are hypertensive recommended follow-up with primary physician for repeat checks.  Will allow permissive hypertension for potential stroke.  Reportedly tachypneic although no respiratory distress on my examination no increased work of breathing respiratory rate of 14 on my exam.    Due to the above findings the following was ordered stroke workup no brain attack was called as symptoms have been greater than 24 hours.  CT imaging of cervical spine for frequent falls.    Lab work reveals no significant electrolyte derangements renal function is appropriate no signs of anemia no leukocytosis making infectious etiology less likely.  Cardiac troponin negative no acute injury pattern on EKG doubt atypical ACS.  CT imaging is concerning for infarct.  Repeat evaluation NIH remains 0 Van negative.  Not a TNK candidate secondary to onset of symptoms.  Was given antiplatelet aspirin.  Patient appreciative of care agreeable with plan.  Discussed findings with hospitalist they have agreed to accept the patient for " further stroke workup.  Patient remains hemodynamically stable resting comfortably admitted in stable condition.    ED Course as of 01/22/24 2057 Mon Jan 22, 2024   1249 I did receive a call from the radiologist specifically discussed left-sided infarct on CT head. [MG]   1317 Interpreted by the Emergency Department Attending: ECG revealed normal sinus rhythm at a rate of 68 beats per minute with SC interval 154 , QRS of 82 , QTc of 389.  No acute injury pattern.  No previous EKG to compare. [MG]      ED Course User Index  [MG] Andre Tatum DO         Diagnoses as of 01/22/24 2057   Cerebrovascular accident (CVA), unspecified mechanism (CMS/HCC)       Patient was counseled regarding labs, imaging, likely diagnosis, and plan. All questions were answered.     ------------------------------------------------------------------  Information provided by the patient  Consults hospitalist  Past medical history complicating workup cerebral palsy  Previous medical records reviewed previous office visit 11/9/2023.  Considered CTA's although Van negative  ------------------------------------------------------------------  ED Medications administered this visit:    Medications   aspirin chewable tablet 81 mg (81 mg oral Given 1/22/24 1615)   clopidogrel (Plavix) tablet 75 mg (75 mg oral Given 1/22/24 1613)        Final Impression:   1. Cerebrovascular accident (CVA), unspecified mechanism (CMS/HCC)          Andre Tatum DO    (Please note that portions of this note were completed with a voice recognition program.  Efforts were made to edit the dictations but occasionally words are mis-transcribed.)     Andre Tatum DO  01/22/24 2100

## 2024-01-22 NOTE — PROGRESS NOTES
Pharmacy Medication History Review    Josh Kessler is a 73 y.o. male admitted for No Principal Problem: There is no principal problem currently on the Problem List. Please update the Problem List and refresh.. Pharmacy reviewed the patient's [id number] medications and allergies for accuracy.    The list below reflectives the updated PTA list. Please review each medication in order reconciliation for additional clarification and justification.  (Not in a hospital admission)       The list below reflectives the updated allergy list. Please review each documented allergy for additional clarification and justification.  Allergies  Reviewed by Lorna Bajwa CPhT on 1/22/2024   No Known Allergies         Below are additional concerns with the patient's PTA list.    See PTA med list    Lorna Bajwa CPhT

## 2024-01-22 NOTE — CONSULTS
"Neurology Consult Note    Patient Name: Josh Kessler   YOB: 1950    HPI:    Patient is a 73-year-old male with medical history significant for cerebral palsy, protein calorie malnutrition, BPH, hyperlipidemia presenting to [facility] due to right sided weakness. Pt noted that he woke up this morning and felt like his right leg was stuck to the floor and he was unable to move his leg which prompted him to come to the ED for further evaluation. Pt does have right sided weakness at baseline due to his CP however the right leg weakness was noted to be new. Pt denies previous hx of stroke, chest pain, sob, dysphasia,  dysarthria, changes in bowel or urinary functions. He did note parathesia in R lower and lower extremity as right lower fact. CT head shows a acute thalamic infarct on left side.  Neuro consulted for new onset stroke.       The patient's past medical history significant for cerebral palsy.    The patient's past surgical history is significant for surgeries as a child related to spasticity.    The patient's social history shows that he is never smoked and only occasionally drinks alcohol.  He does not use illicit drugs, is  and has children.    The patient's family history was reviewed and is noncontributory.    I did a 12 point review of systems that was negative except for that mentioned in the HPI.    Objective:  BP (!) 184/89   Pulse 66   Temp 36.3 °C (97.3 °F) (Temporal)   Resp 20   Ht 1.727 m (5' 7.99\")   Wt 53.8 kg (118 lb 9.7 oz)   SpO2 97%   BMI 18.04 kg/m²      The patient is a well developed, [well-nourished] [male] in no acute distress.    The patient's funduscopic examination shows no papilledema bilaterally.    The patient's extremity examination shows that the pulses are 2+ in the upper and lower extremities bilaterally and there is no edema in the lower extremities bilaterally.    The patient's mental status testing is alert and oriented ×3 with no evidence of " aphasia or dysarthria.  The patient's memory testing, fund of knowledge and concentration are all within normal limits.  The patient's cranial nerves 2, 3, 4, 5, 6, 7, 8, 9, 10, 11 and 12 are all within normal limits.  The patient's motor testing shows increased tone in the lower extremities greater than the upper extremities.  The patient has normal bulk.  The patient's left upper and lower extremity strength are 5/5.  The patient's right upper and right lower extremity strength are 5-/5.  The patient's sensory testing is intact to light touch in the upper and lower extremities bilaterally.  The patient's cerebellar testing is intact in the upper and lower extremities bilaterally.  The patient's station and gait were not tested as the patient cannot stand.  The patient's reflexes are 2-3+ in the upper extremities bilaterally, 4+ at the knees bilaterally with crossed adductors noted bilaterally and 2+ at the ankles bilaterally.    Scheduled medications  aspirin, 324 mg, oral, Once  [START ON 1/23/2024] aspirin, 81 mg, oral, Daily  atorvastatin, 40 mg, oral, Nightly  clopidogrel, 75 mg, oral, Daily      Continuous medications     PRN medications  PRN medications: oxygen     Results for orders placed or performed during the hospital encounter of 01/22/24 (from the past 96 hour(s))   ECG 12 lead   Result Value Ref Range    Ventricular Rate 68 BPM    Atrial Rate 68 BPM    MS Interval 154 ms    QRS Duration 82 ms    QT Interval 366 ms    QTC Calculation(Bazett) 389 ms    P Axis -7 degrees    R Axis 19 degrees    T Axis 53 degrees    QRS Count 11 beats    Q Onset 222 ms    P Onset 145 ms    P Offset 183 ms    T Offset 405 ms    QTC Fredericia 381 ms   CBC and Auto Differential   Result Value Ref Range    WBC 9.8 4.4 - 11.3 x10*3/uL    nRBC 0.0 0.0 - 0.0 /100 WBCs    RBC 4.91 4.50 - 5.90 x10*6/uL    Hemoglobin 16.4 13.5 - 17.5 g/dL    Hematocrit 47.5 41.0 - 52.0 %    MCV 97 80 - 100 fL    MCH 33.4 26.0 - 34.0 pg    MCHC  34.5 32.0 - 36.0 g/dL    RDW 11.9 11.5 - 14.5 %    Platelets 205 150 - 450 x10*3/uL    Neutrophils % 71.4 40.0 - 80.0 %    Immature Granulocytes %, Automated 0.1 0.0 - 0.9 %    Lymphocytes % 19.0 13.0 - 44.0 %    Monocytes % 8.0 2.0 - 10.0 %    Eosinophils % 0.8 0.0 - 6.0 %    Basophils % 0.7 0.0 - 2.0 %    Neutrophils Absolute 6.96 (H) 1.60 - 5.50 x10*3/uL    Immature Granulocytes Absolute, Automated 0.01 0.00 - 0.50 x10*3/uL    Lymphocytes Absolute 1.85 0.80 - 3.00 x10*3/uL    Monocytes Absolute 0.78 0.05 - 0.80 x10*3/uL    Eosinophils Absolute 0.08 0.00 - 0.40 x10*3/uL    Basophils Absolute 0.07 0.00 - 0.10 x10*3/uL   Comprehensive metabolic panel   Result Value Ref Range    Glucose 134 (H) 74 - 99 mg/dL    Sodium 137 136 - 145 mmol/L    Potassium 3.8 3.5 - 5.3 mmol/L    Chloride 101 98 - 107 mmol/L    Bicarbonate 29 21 - 32 mmol/L    Anion Gap 11 10 - 20 mmol/L    Urea Nitrogen 21 6 - 23 mg/dL    Creatinine 1.06 0.50 - 1.30 mg/dL    eGFR 73 >60 mL/min/1.73m*2    Calcium 9.8 8.6 - 10.3 mg/dL    Albumin 4.5 3.4 - 5.0 g/dL    Alkaline Phosphatase 105 33 - 136 U/L    Total Protein 7.4 6.4 - 8.2 g/dL    AST 22 9 - 39 U/L    Bilirubin, Total 1.0 0.0 - 1.2 mg/dL    ALT 26 10 - 52 U/L   Troponin I, High Sensitivity   Result Value Ref Range    Troponin I, High Sensitivity 6 0 - 20 ng/L   Protime-INR   Result Value Ref Range    Protime 11.6 9.8 - 12.8 seconds    INR 1.0 0.9 - 1.1   APTT   Result Value Ref Range    aPTT 29 27 - 38 seconds   Hemoglobin A1c   Result Value Ref Range    Hemoglobin A1C 5.0 see below %    Estimated Average Glucose 97 Not Established mg/dL   Lipid panel   Result Value Ref Range    Cholesterol 171 0 - 199 mg/dL    HDL-Cholesterol 56.5 mg/dL    Cholesterol/HDL Ratio 3.0     LDL Calculated 102 (H) <=99 mg/dL    VLDL 13 0 - 40 mg/dL    Triglycerides 65 0 - 149 mg/dL    Non HDL Cholesterol 115 0 - 149 mg/dL        CT head wo IV contrast    Result Date: 1/22/2024  Interpreted By:  Caro Abreu, STUDY:  CT HEAD WO IV CONTRAST; ;  1/22/2024 12:19 pm   INDICATION: Signs/Symptoms:righ sided weakness numbness.   COMPARISON: 04/17/2018   ACCESSION NUMBER(S): [id number]   ORDERING CLINICIAN: PEDRO PETERSON   TECHNIQUE: Serial axial images of the head were obtained without intravenous contrast. Without intravenous contrast   FINDINGS: The ventricles are midline and normal in size.   There is a small left thalamic infarct.   There is no hemorrhage or extra-axial fluid.   There is no obvious scalp hematoma or skull fracture.   The paranasal sinuses and mastoids are unremarkable.   COMPARISON OF FINDINGS: The thalamic infarct was not obvious previously.       Interval development of a left thalamic infarct.   This was conveyed by phone to Dr. Peterson at 12:50 p.m..     MACRO: none   Signed by: Caro Arbeu 1/22/2024 12:50 PM Dictation workstation:   [id number]      No MRI head results found for the past 14 days           Assessment/Plan:    Impression: The patient is 73-year-old male with a history of cerebral palsy who developed right-sided weakness and numbness and difficulties with ambulation.  His neurological examination is abnormal and noted above.  The differential diagnosis includes cerebral infarction secondary to intra or extracranial atherosclerotic disease, small vessel stroke or cardiac source for embolism.  Plan: The patient needs an MRI of the brain without contrast as well as an MRA of the neck and brain.  The patient needs an echocardiogram, lipid panel and hemoglobin A1c.  The patient will need a Zio patch for 2 weeks.  The patient will need to be on Plavix 75 mg a day and aspirin 81 mg a day for 21 days.  After 21 days, the Plavix can be discontinued and aspirin can be continued at 81 mg a day.  The patient needs to continue stroke risk factor modification.   The patient needs a PT, OT, social service, rehab and speech therapy consult.  The patient needs DVT prophylaxis.  The patient needs  neurochecks as per protocol.  I will send the note to Dr. Mar.  Thank you very much for sending me this very interesting consultation.  I discussed all these issues in detail with the patient and answered all their questions.  I will continue to follow the patient while they are in the hospital.  The patient needs follow-up with their primary care doctor within 2 weeks of discharge.  On discharge, the patient will follow up with me in the office in 4 months.            Ranjan Goncalves DO  Neurology

## 2024-01-22 NOTE — DISCHARGE INSTRUCTIONS
-You will wear a 14-day cardiac event monitor to rule out evidence of cardiac arrhythmias  -Aspirin 81 mg 1 tablet daily  -Plavix 75 mg 1 tablet daily for 20 days  -Atorvastatin 40 mg 1 tablet daily  -Follow-up with neurology within 1 month of discharge  -Follow-up with PCP within 2 weeks of discharge

## 2024-01-22 NOTE — ACP (ADVANCE CARE PLANNING)
Discussed code status with patient, he very clearly expressed wishes to be DNAR/DNI. He was very familiar with terminology and expressed clear understanding as he just had to deal with similar code status issues with his wife this past fall when she passed away.

## 2024-01-22 NOTE — H&P
History Of Present Illness  Josh Kessler is a 73 y.o. male with a PMHx of cerebral palsy presenting with symptoms of on/off weakness and numbness for the past week. He states prior to these symptoms occurring he recalled having the worst H/A of his life around [place]. He states the H/A resolved a few days later. He states since Thursday he has been feeling weakness in his R extremity along with right lip and thumb numbness. He also reports a right lip droop that occurred along with these symptoms but resolved a few days later. He states this R extremity weakness is different from his typical baseline with his hx of cerebral palsy. He denies acute vision changes, chest pain, palpations, N/V, trouble with speech/ swallowing.     Past Medical History  Past Medical History:   Diagnosis Date    Cerebral palsy (CMS/[facility])     Other specified health status     No pertinent past medical history       Surgical History  Past Surgical History:   Procedure Laterality Date    OTHER SURGICAL HISTORY  04/24/2018    History Of Prior Surgery        Social History  He reports that he has never smoked. He has never used smokeless tobacco. He reports that he does not drink alcohol and does not use drugs.    Family History  No family history on file.     Allergies  Patient has no known allergies.    Review of Systems  10-point ROS completed all negative expect for positives stated in HPI.     Physical Exam  Constitutional:       Appearance: Normal appearance.   HENT:      Head: Normocephalic and atraumatic.   Eyes:      Extraocular Movements: Extraocular movements intact.      Conjunctiva/sclera: Conjunctivae normal.   Cardiovascular:      Rate and Rhythm: Normal rate and regular rhythm.   Pulmonary:      Effort: Pulmonary effort is normal.      Breath sounds: Normal breath sounds.   Skin:     General: Skin is warm and dry.   Neurological:      Mental Status: He is alert and oriented to person, place, and time.      Comments: SITA  extremity weakness  NIH 2 (Right leg motor drift, some effort against gravity)   Psychiatric:         Mood and Affect: Mood normal.         Behavior: Behavior normal.           Last Recorded Vitals  Blood pressure (!) 184/89, pulse 66, temperature 36.3 °C (97.3 °F), resp. rate 19, SpO2 97 %.    Relevant Results           Assessment/Plan     #Subacute small left thalamic infarct  CT head obtained showed acute L thalamic stroke   DAPT: Load --> Clopidogrel 300mg once, Aspirin 324mg once  Then Clopidogrel 75mg daily, Aspirin 81mg daily  Atrovastatin 40mg daily  MRI Brain, MRA Head/ Neck  Echo w/ bubble study   Out of permissive HTN time period will monitor BP  A1C and lipid panel pending   Neuro consulted      #Hx of cerebral palsy  PT/OT      Code: DNR/DNI  DVT prophylaxis: Enoxaparin 40mg                      Callum LARIOS

## 2024-01-23 ENCOUNTER — HOME HEALTH ADMISSION (OUTPATIENT)
Dept: HOME HEALTH SERVICES | Facility: HOME HEALTH | Age: 74
End: 2024-01-23
Payer: MEDICARE

## 2024-01-23 ENCOUNTER — DOCUMENTATION (OUTPATIENT)
Dept: HOME HEALTH SERVICES | Facility: HOME HEALTH | Age: 74
End: 2024-01-23
Payer: MEDICARE

## 2024-01-23 ENCOUNTER — APPOINTMENT (OUTPATIENT)
Dept: RADIOLOGY | Facility: HOSPITAL | Age: 74
DRG: 065 | End: 2024-01-23
Payer: MEDICARE

## 2024-01-23 LAB
GLUCOSE BLD MANUAL STRIP-MCNC: 110 MG/DL (ref 74–99)
GLUCOSE BLD MANUAL STRIP-MCNC: 182 MG/DL (ref 74–99)
GLUCOSE BLD MANUAL STRIP-MCNC: 88 MG/DL (ref 74–99)
GLUCOSE BLD MANUAL STRIP-MCNC: 98 MG/DL (ref 74–99)

## 2024-01-23 PROCEDURE — 97165 OT EVAL LOW COMPLEX 30 MIN: CPT | Mod: GO

## 2024-01-23 PROCEDURE — 2550000001 HC RX 255 CONTRASTS: Performed by: INTERNAL MEDICINE

## 2024-01-23 PROCEDURE — 92610 EVALUATE SWALLOWING FUNCTION: CPT | Mod: GN | Performed by: SPEECH-LANGUAGE PATHOLOGIST

## 2024-01-23 PROCEDURE — 2500000001 HC RX 250 WO HCPCS SELF ADMINISTERED DRUGS (ALT 637 FOR MEDICARE OP): Performed by: NURSE PRACTITIONER

## 2024-01-23 PROCEDURE — 99232 SBSQ HOSP IP/OBS MODERATE 35: CPT | Performed by: NURSE PRACTITIONER

## 2024-01-23 PROCEDURE — 97162 PT EVAL MOD COMPLEX 30 MIN: CPT | Mod: GP

## 2024-01-23 PROCEDURE — 2500000001 HC RX 250 WO HCPCS SELF ADMINISTERED DRUGS (ALT 637 FOR MEDICARE OP): Performed by: STUDENT IN AN ORGANIZED HEALTH CARE EDUCATION/TRAINING PROGRAM

## 2024-01-23 PROCEDURE — 70496 CT ANGIOGRAPHY HEAD: CPT | Performed by: RADIOLOGY

## 2024-01-23 PROCEDURE — 82947 ASSAY GLUCOSE BLOOD QUANT: CPT

## 2024-01-23 PROCEDURE — 2060000001 HC INTERMEDIATE ICU ROOM DAILY

## 2024-01-23 PROCEDURE — 2500000004 HC RX 250 GENERAL PHARMACY W/ HCPCS (ALT 636 FOR OP/ED)

## 2024-01-23 PROCEDURE — 70496 CT ANGIOGRAPHY HEAD: CPT

## 2024-01-23 PROCEDURE — 99239 HOSP IP/OBS DSCHRG MGMT >30: CPT

## 2024-01-23 RX ORDER — CLOPIDOGREL BISULFATE 75 MG/1
75 TABLET ORAL DAILY
Qty: 20 TABLET | Refills: 0 | Status: SHIPPED | OUTPATIENT
Start: 2024-01-24 | End: 2024-02-13 | Stop reason: ALTCHOICE

## 2024-01-23 RX ORDER — ATORVASTATIN CALCIUM 40 MG/1
40 TABLET, FILM COATED ORAL NIGHTLY
Qty: 30 TABLET | Refills: 0 | Status: SHIPPED | OUTPATIENT
Start: 2024-01-23 | End: 2024-02-20 | Stop reason: SDUPTHER

## 2024-01-23 RX ORDER — NAPROXEN SODIUM 220 MG/1
81 TABLET, FILM COATED ORAL DAILY
Qty: 30 TABLET | Refills: 1 | Status: SHIPPED | OUTPATIENT
Start: 2024-01-24 | End: 2024-02-20 | Stop reason: SDUPTHER

## 2024-01-23 RX ADMIN — ATORVASTATIN CALCIUM 40 MG: 40 TABLET, FILM COATED ORAL at 20:01

## 2024-01-23 RX ADMIN — CLOPIDOGREL 75 MG: 75 TABLET ORAL at 09:44

## 2024-01-23 RX ADMIN — ASPIRIN 81 MG 81 MG: 81 TABLET ORAL at 09:44

## 2024-01-23 RX ADMIN — ENOXAPARIN SODIUM 40 MG: 40 INJECTION SUBCUTANEOUS at 15:08

## 2024-01-23 RX ADMIN — IOHEXOL 90 ML: 350 INJECTION, SOLUTION INTRAVENOUS at 16:41

## 2024-01-23 ASSESSMENT — COGNITIVE AND FUNCTIONAL STATUS - GENERAL
TOILETING: A LITTLE
MOBILITY SCORE: 19
CLIMB 3 TO 5 STEPS WITH RAILING: A LITTLE
MOBILITY SCORE: 19
MOVING TO AND FROM BED TO CHAIR: A LITTLE
DAILY ACTIVITIY SCORE: 22
HELP NEEDED FOR BATHING: A LITTLE
HELP NEEDED FOR BATHING: A LITTLE
MOVING TO AND FROM BED TO CHAIR: A LITTLE
CLIMB 3 TO 5 STEPS WITH RAILING: A LOT
STANDING UP FROM CHAIR USING ARMS: A LITTLE
WALKING IN HOSPITAL ROOM: A LITTLE
DAILY ACTIVITIY SCORE: 23
CLIMB 3 TO 5 STEPS WITH RAILING: A LOT
WALKING IN HOSPITAL ROOM: A LITTLE
STANDING UP FROM CHAIR USING ARMS: A LITTLE
WALKING IN HOSPITAL ROOM: A LITTLE
TOILETING: A LITTLE
MOVING TO AND FROM BED TO CHAIR: A LITTLE
STANDING UP FROM CHAIR USING ARMS: A LITTLE
MOBILITY SCORE: 20
DAILY ACTIVITIY SCORE: 22
TOILETING: A LITTLE

## 2024-01-23 ASSESSMENT — PAIN - FUNCTIONAL ASSESSMENT
PAIN_FUNCTIONAL_ASSESSMENT: 0-10
PAIN_FUNCTIONAL_ASSESSMENT: 0-10

## 2024-01-23 ASSESSMENT — PAIN SCALES - GENERAL
PAINLEVEL_OUTOF10: 0 - NO PAIN
PAINLEVEL_OUTOF10: 0 - NO PAIN

## 2024-01-23 NOTE — PROGRESS NOTES
01/23/24 1136   Discharge Planning   Living Arrangements Alone   Assistance Needed Independent with ADL's, patient does drive   Type of Residence Private residence  (one story home)   Number of Stairs to Enter Residence 4   Patient expects to be discharged to: Home     Met with patient at bedside, introduced self and role on care transitions team. Admission assessment completed with patient. Patient is independent, uses walker for ambulation. Address and contact information verified.  PCP: Dr. Dewey Cruz, last visit November 2023  Insurance: [employer]  Pharmacy: [facility] on [address]  Patient has declined any home going needs at this time. PT/OT evaluation is pending.

## 2024-01-23 NOTE — PROGRESS NOTES
"Josh Kessler is a 73 y.o. male on day 1 of admission presenting with Cerebrovascular accident (CVA), unspecified mechanism (CMS/HCC).    Subjective   MRI of the brain without contrast does confirm left thalamic CVA, in addition to noting a second acute/subacute CVA to the left occipital lobe.  MRA of the head does note questionable area of occlusion to the left PCA, which correlates with left occipital lobe stroke.  Patient has been sent for CT angiogram of the head to confirm/deny these findings.  Echocardiogram pending completion.       Objective     Last Recorded Vitals  Blood pressure 136/75, pulse 71, temperature 36.8 °C (98.2 °F), temperature source Temporal, resp. rate 18, height 1.727 m (5' 7.99\"), weight 53.8 kg (118 lb 9.7 oz), SpO2 96 %.  Physical exam/neurological exam  Patient seen and examined at this time; upon entering room he is resting quietly in bed. Appears fully developed and well nourished.   Mental status: A&Ox3. Memory testing, fund of knowledge and concentration WNL. Speech is fluent and negative for any paraphrasic errors.     The patient's mental status testing is alert and oriented ×3 with no evidence of aphasia or dysarthria.  The patient's memory testing, fund of knowledge and concentration are all within normal limits.  The patient's cranial nerves 2, 3, 4, 5, 6, 7, 8, 9, 10, 11 and 12 are all within normal limits.  The patient's motor testing shows increased tone in the lower extremities greater than the upper extremities.  The patient has normal bulk.  The patient's left upper and lower extremity strength are 5/5.  The patient's right upper and right lower extremity strength are 5-/5.  The patient's sensory testing is intact to light touch in the upper and lower extremities bilaterally.  The patient's cerebellar testing is intact in the upper and lower extremities bilaterally.  The patient's station and gait were not tested as the patient cannot stand.  The patient's reflexes are " 2-3+ in the upper extremities bilaterally, 4+ at the knees bilaterally with crossed adductors noted bilaterally and 2+ at the ankles bilaterally.    Relevant Results  NIH Stroke Scale: 1            Valeri Coma Scale  Best Eye Response: Spontaneous  Best Verbal Response: Oriented  Best Motor Response: Follows commands  Valeri Coma Scale Score: 15        Scheduled medications  aspirin, 81 mg, oral, Daily  atorvastatin, 40 mg, oral, Nightly  clopidogrel, 75 mg, oral, Daily  enoxaparin, 40 mg, subcutaneous, q24h      Continuous medications     PRN medications  PRN medications: acetaminophen, ondansetron, oxygen  MR angio head wo IV contrast    Result Date: 1/23/2024  Interpreted By:  Anmol Medeiros, STUDY: MR BRAIN WO IV CONTRAST; MR ANGIO NECK WO IV CONTRAST; MR ANGIO HEAD WO IV CONTRAST;  1/22/2024 7:26 pm   INDICATION: Signs/Symptoms:Thalamic stroke.   COMPARISON: Noncontrast CT of the brain dated 01/22/2024.   ACCESSION NUMBER(S): [id number]; [id number]; [id number]   ORDERING CLINICIAN: PUJA BALLARD   TECHNIQUE: Axial T2, FLAIR, DWI, gradient echo T2 and sagittal and coronal T1 weighted images of brain were acquired. Noncontrast time-of-flight MR angiogram of the jwgddc-wa-Kymrwz vessels was obtained with MIP reformats. Noncontrast time of flight MR angiogram of the neck vessels was obtained with MIP reformats.   FINDINGS: Brain MRI: There is diffusion restriction in the left thalamus with associated T2 hyperintense signal consistent with acute or early subacute infarct. There are also tiny foci of diffusion restriction in the left occipital lobe, images 56 and 55 of 80. No evidence of hemorrhage. There are no extra-axial collections. There is no mass lesion and no midline shift. There is no hydrocephalus. There are scattered periventricular and deep white matter FLAIR hyperintensities suggestive of mild to moderate chronic microvascular ischemic change.   Paranasal Sinuses and Mastoids: Visualized paranasal  sinuses are well aerated. The mastoid air cells are clear. The orbits are grossly normal.   MRA of the brain: Anterior circulation: The intracranial portions of the internal carotid, middle cerebral, and anterior cerebral arteries are patent, without significant focal stenosis. There is a normal anterior communicating artery.   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 The basilar artery is patent. There is a normal fetal variant of the right posterior cerebral artery. Normal left posterior cerebral artery is not visualized, with predominantly fetal posterior cerebral artery on the left on MRI from 2015 which was patent. There is linear susceptibility artifact in the expected location of the left P2, image 17 of 40, suspicious for left PCA occlusion.     MRA of the neck: Common carotid arteries: Limited evaluation. Left internal carotid: No significant stenosis. Right internal carotid: No significant stenosis. Cervical vertebral arteries: Normal right vertebral artery. There is decreased flow enhancement of the left vertebral artery, which may be technical in the setting of hypoplastic vertebral artery, however possibility of diffuse stenosis or underlying dissection can not be excluded.       Acute to early subacute infarct in the left thalamus and additional punctate foci of acute to early subacute infarction in the left occipital lobe.   Suspected occlusion of the proximal left posterior cerebral artery at the level of proximal P2.   Diffusely decreased flow enhancement of the cervical left vertebral artery, which may be due to hypoplasia, however superimposed stenosis not excluded. This may be confirmed with CT angiogram of head and neck. Anmol Medeiros discussed the significance and urgency of this critical finding via Epic chat with [facility]  on 1/23/2024 at 5:25 am.  (**-RCF-**) Findings:  See findings.      Signed by: Anmol Medeiros 1/23/2024 5:36 AM Dictation workstation:   [id number]    MR angio neck wo IV contrast    Result Date: 1/23/2024  Interpreted By:  Anmol Medeiros, STUDY: MR BRAIN WO IV CONTRAST; MR ANGIO NECK WO IV CONTRAST; MR ANGIO HEAD WO IV CONTRAST;  1/22/2024 7:26 pm   INDICATION: Signs/Symptoms:Thalamic stroke.   COMPARISON: Noncontrast CT of the brain dated 01/22/2024.   ACCESSION NUMBER(S): [id number]; [id number]; [id number]   ORDERING CLINICIAN: PUJA BALLARD   TECHNIQUE: Axial T2, FLAIR, DWI, gradient echo T2 and sagittal and coronal T1 weighted images of brain were acquired. Noncontrast time-of-flight MR angiogram of the ygxpue-dc-Xzwmjw vessels was obtained with MIP reformats. Noncontrast time of flight MR angiogram of the neck vessels was obtained with MIP reformats.   FINDINGS: Brain MRI: There is diffusion restriction in the left thalamus with associated T2 hyperintense signal consistent with acute or early subacute infarct. There are also tiny foci of diffusion restriction in the left occipital lobe, images 56 and 55 of 80. No evidence of hemorrhage. There are no extra-axial collections. There is no mass lesion and no midline shift. There is no hydrocephalus. There are scattered periventricular and deep white matter FLAIR hyperintensities suggestive of mild to moderate chronic microvascular ischemic change.   Paranasal Sinuses and Mastoids: Visualized paranasal sinuses are well aerated. The mastoid air cells are clear. The orbits are grossly normal.   MRA of the brain: Anterior circulation: The intracranial portions of the internal carotid, middle cerebral, and anterior cerebral arteries are patent, without significant focal stenosis. There is a normal anterior communicating artery.   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 The basilar artery is patent. There is a normal fetal variant of the  right posterior cerebral artery. Normal left posterior cerebral artery is not visualized, with predominantly fetal posterior cerebral artery on the left on MRI from 2015 which was patent. There is linear susceptibility artifact in the expected location of the left P2, image 17 of 40, suspicious for left PCA occlusion.     MRA of the neck: Common carotid arteries: Limited evaluation. Left internal carotid: No significant stenosis. Right internal carotid: No significant stenosis. Cervical vertebral arteries: Normal right vertebral artery. There is decreased flow enhancement of the left vertebral artery, which may be technical in the setting of hypoplastic vertebral artery, however possibility of diffuse stenosis or underlying dissection can not be excluded.       Acute to early subacute infarct in the left thalamus and additional punctate foci of acute to early subacute infarction in the left occipital lobe.   Suspected occlusion of the proximal left posterior cerebral artery at the level of proximal P2.   Diffusely decreased flow enhancement of the cervical left vertebral artery, which may be due to hypoplasia, however superimposed stenosis not excluded. This may be confirmed with CT angiogram of head and neck. Anmol Medeiros discussed the significance and urgency of this critical finding via Epic chat with [facility]  on 1/23/2024 at 5:25 am.  (**-RCF-**) Findings:  See findings.     Signed by: Anmol Medeiros 1/23/2024 5:36 AM Dictation workstation:   [id number]    MR brain wo IV contrast    Result Date: 1/23/2024  Interpreted By:  Anmol Medeiros, STUDY: MR BRAIN WO IV CONTRAST; MR ANGIO NECK WO IV CONTRAST; MR ANGIO HEAD WO IV CONTRAST;  1/22/2024 7:26 pm   INDICATION: Signs/Symptoms:Thalamic stroke.   COMPARISON: Noncontrast CT of the brain dated 01/22/2024.   ACCESSION NUMBER(S): [id number]; [id number]; [id number]   ORDERING CLINICIAN: PUJA BALLARD   TECHNIQUE: Axial T2, FLAIR, DWI, gradient echo T2 and  sagittal and coronal T1 weighted images of brain were acquired. Noncontrast time-of-flight MR angiogram of the ivefik-es-Mavpck vessels was obtained with MIP reformats. Noncontrast time of flight MR angiogram of the neck vessels was obtained with MIP reformats.   FINDINGS: Brain MRI: There is diffusion restriction in the left thalamus with associated T2 hyperintense signal consistent with acute or early subacute infarct. There are also tiny foci of diffusion restriction in the left occipital lobe, images 56 and 55 of 80. No evidence of hemorrhage. There are no extra-axial collections. There is no mass lesion and no midline shift. There is no hydrocephalus. There are scattered periventricular and deep white matter FLAIR hyperintensities suggestive of mild to moderate chronic microvascular ischemic change.   Paranasal Sinuses and Mastoids: Visualized paranasal sinuses are well aerated. The mastoid air cells are clear. The orbits are grossly normal.   MRA of the brain: Anterior circulation: The intracranial portions of the internal carotid, middle cerebral, and anterior cerebral arteries are patent, without significant focal stenosis. There is a normal anterior communicating artery.   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 The basilar artery is patent. There is a normal fetal variant of the right posterior cerebral artery. Normal left posterior cerebral artery is not visualized, with predominantly fetal posterior cerebral artery on the left on MRI from 2015 which was patent. There is linear susceptibility artifact in the expected location of the left P2, image 17 of 40, suspicious for left PCA occlusion.     MRA of the neck: Common carotid arteries: Limited evaluation. Left internal carotid: No significant stenosis. Right internal carotid: No significant stenosis. Cervical vertebral arteries: Normal right vertebral artery.  There is decreased flow enhancement of the left vertebral artery, which may be technical in the setting of hypoplastic vertebral artery, however possibility of diffuse stenosis or underlying dissection can not be excluded.       Acute to early subacute infarct in the left thalamus and additional punctate foci of acute to early subacute infarction in the left occipital lobe.   Suspected occlusion of the proximal left posterior cerebral artery at the level of proximal P2.   Diffusely decreased flow enhancement of the cervical left vertebral artery, which may be due to hypoplasia, however superimposed stenosis not excluded. This may be confirmed with CT angiogram of head and neck. Anmol Medeiros discussed the significance and urgency of this critical finding via Epic chat with [facility]  on 1/23/2024 at 5:25 am.  (**-RCF-**) Findings:  See findings.     Signed by: Anmol Medeiros 1/23/2024 5:36 AM Dictation workstation:   [id number]    XR chest 1 view    Result Date: 1/22/2024  STUDY: Chest Radiograph;  1/22/2024 12:50 AM. INDICATION: Stroke. COMPARISON: None Available. ACCESSION NUMBER(S): [id number] ORDERING CLINICIAN: PEDRO PETERSON TECHNIQUE:  Frontal chest was obtained at 15:50 hours. FINDINGS: CARDIOMEDIASTINAL SILHOUETTE: Cardiomediastinal silhouette is normal in size and configuration.  LUNGS: Lungs are clear.  ABDOMEN: No remarkable upper abdominal findings.  BONES: No acute osseous changes.    No detectable active cardiopulmonary disease. Signed by Andrea Amador MD    ECG 12 lead    Result Date: 1/22/2024  Normal sinus rhythm Cannot rule out Anterior infarct , age undetermined Abnormal ECG No previous ECGs available    CT cervical spine wo IV contrast    Result Date: 1/22/2024  Interpreted By:  Caro Abreu, STUDY: CT CERVICAL SPINE WO IV CONTRAST; ;  1/22/2024 12:19 pm   INDICATION: Signs/Symptoms:weakness falls.   COMPARISON: 04/17/2018   ACCESSION NUMBER(S): [id number]   ORDERING CLINICIAN:  PEDRO PETERSON   TECHNIQUE: Serial axial images of the cervical spine were obtained. Sagittal and coronal reconstructions were generated.   FINDINGS: The alignment of spine is unremarkable. There is no obvious fracture or bony destruction.   The prevertebral soft tissues are unremarkable.   There is disc disease. There is vertebral body endplate spurring. There is facet hypertrophy.   When compared to the prior exam. The spine is similar.       No acute bony abnormality.   Carotid artery calcifications.     MACRO: None   Signed by: Caro Abreu 1/22/2024 12:52 PM Dictation workstation:   [id number]    CT head wo IV contrast    Result Date: 1/22/2024  Interpreted By:  Caro Abreu, STUDY: CT HEAD WO IV CONTRAST; ;  1/22/2024 12:19 pm   INDICATION: Signs/Symptoms:righ sided weakness numbness.   COMPARISON: 04/17/2018   ACCESSION NUMBER(S): [id number]   ORDERING CLINICIAN: PEDRO PETERSON   TECHNIQUE: Serial axial images of the head were obtained without intravenous contrast. Without intravenous contrast   FINDINGS: The ventricles are midline and normal in size.   There is a small left thalamic infarct.   There is no hemorrhage or extra-axial fluid.   There is no obvious scalp hematoma or skull fracture.   The paranasal sinuses and mastoids are unremarkable.   COMPARISON OF FINDINGS: The thalamic infarct was not obvious previously.       Interval development of a left thalamic infarct.   This was conveyed by phone to Dr. Peterson at 12:50 p.m..     MACRO: none   Signed by: Caro Abreu 1/22/2024 12:50 PM Dictation workstation:   [id number]   Results for orders placed or performed during the hospital encounter of 01/22/24 (from the past 24 hour(s))   ECG 12 lead   Result Value Ref Range    Ventricular Rate 68 BPM    Atrial Rate 68 BPM    NJ Interval 154 ms    QRS Duration 82 ms    QT Interval 366 ms    QTC Calculation(Bazett) 389 ms    P Axis -7 degrees    R Axis 19 degrees    T Axis 53 degrees    QRS Count 11  beats    Q Onset 222 ms    P Onset 145 ms    P Offset 183 ms    T Offset 405 ms    QTC Fredericia 381 ms   CBC and Auto Differential   Result Value Ref Range    WBC 9.8 4.4 - 11.3 x10*3/uL    nRBC 0.0 0.0 - 0.0 /100 WBCs    RBC 4.91 4.50 - 5.90 x10*6/uL    Hemoglobin 16.4 13.5 - 17.5 g/dL    Hematocrit 47.5 41.0 - 52.0 %    MCV 97 80 - 100 fL    MCH 33.4 26.0 - 34.0 pg    MCHC 34.5 32.0 - 36.0 g/dL    RDW 11.9 11.5 - 14.5 %    Platelets 205 150 - 450 x10*3/uL    Neutrophils % 71.4 40.0 - 80.0 %    Immature Granulocytes %, Automated 0.1 0.0 - 0.9 %    Lymphocytes % 19.0 13.0 - 44.0 %    Monocytes % 8.0 2.0 - 10.0 %    Eosinophils % 0.8 0.0 - 6.0 %    Basophils % 0.7 0.0 - 2.0 %    Neutrophils Absolute 6.96 (H) 1.60 - 5.50 x10*3/uL    Immature Granulocytes Absolute, Automated 0.01 0.00 - 0.50 x10*3/uL    Lymphocytes Absolute 1.85 0.80 - 3.00 x10*3/uL    Monocytes Absolute 0.78 0.05 - 0.80 x10*3/uL    Eosinophils Absolute 0.08 0.00 - 0.40 x10*3/uL    Basophils Absolute 0.07 0.00 - 0.10 x10*3/uL   Comprehensive metabolic panel   Result Value Ref Range    Glucose 134 (H) 74 - 99 mg/dL    Sodium 137 136 - 145 mmol/L    Potassium 3.8 3.5 - 5.3 mmol/L    Chloride 101 98 - 107 mmol/L    Bicarbonate 29 21 - 32 mmol/L    Anion Gap 11 10 - 20 mmol/L    Urea Nitrogen 21 6 - 23 mg/dL    Creatinine 1.06 0.50 - 1.30 mg/dL    eGFR 73 >60 mL/min/1.73m*2    Calcium 9.8 8.6 - 10.3 mg/dL    Albumin 4.5 3.4 - 5.0 g/dL    Alkaline Phosphatase 105 33 - 136 U/L    Total Protein 7.4 6.4 - 8.2 g/dL    AST 22 9 - 39 U/L    Bilirubin, Total 1.0 0.0 - 1.2 mg/dL    ALT 26 10 - 52 U/L   Troponin I, High Sensitivity   Result Value Ref Range    Troponin I, High Sensitivity 6 0 - 20 ng/L   Protime-INR   Result Value Ref Range    Protime 11.6 9.8 - 12.8 seconds    INR 1.0 0.9 - 1.1   APTT   Result Value Ref Range    aPTT 29 27 - 38 seconds   Hemoglobin A1c   Result Value Ref Range    Hemoglobin A1C 5.0 see below %    Estimated Average Glucose 97 Not  Established mg/dL   Lipid panel   Result Value Ref Range    Cholesterol 171 0 - 199 mg/dL    HDL-Cholesterol 56.5 mg/dL    Cholesterol/HDL Ratio 3.0     LDL Calculated 102 (H) <=99 mg/dL    VLDL 13 0 - 40 mg/dL    Triglycerides 65 0 - 149 mg/dL    Non HDL Cholesterol 115 0 - 149 mg/dL   POCT GLUCOSE   Result Value Ref Range    POCT Glucose 89 74 - 99 mg/dL   POCT GLUCOSE   Result Value Ref Range    POCT Glucose 88 74 - 99 mg/dL   POCT GLUCOSE   Result Value Ref Range    POCT Glucose 98 74 - 99 mg/dL                               Assessment/Plan   This patient currently has cardiac telemetry ordered; if you would like to modify or discontinue the telemetry order, click here to go to the orders activity to modify/discontinue the order.  Principal Problem:    Cerebrovascular accident (CVA), unspecified mechanism (CMS/HCC)  -Patient to continue dual antiplatelet therapy x 21 days for CVA prophylaxis.  After 21-day duration, patient to discontinue Plavix 75 mg p.o. daily and continue ASA 81 mg p.o. daily as monotherapy.  It is also recommended for patient to continue atorvastatin 40 mg p.o. daily for additional prophylaxis.  -Patient requires CT angiogram of the head to confirm/deny suspected left PCA occlusion noted on MRA imaging.  Confirmed, patient is not a candidate for mechanical intervention at this time given location of occlusion and low NIH score.  -Echocardiogram with bubble study pending completion  -Recommendations for needs during hospitalization and at discharge via PT and OT  -Continue promotion of lifestyle modifications, such as: Strict BP and glycemic control, dietary habit changes, incorporation of daily exercise regimen, adherence to all prescription/OTC medication schedules, attendance to all follow-up appointments, cessation from smoking if applicable, abstinence from alcohol and illicit drug use if applicable  -Patient to follow-up with PCP in 1 to 2 weeks postdischarge  -Patient to wear a 14-day  cardiac event monitor at discharge  -If desired, patient can follow-up with endovascular surgeon Dr. Willard Chester to discuss stenting of occluded cerebral artery  -Patient to follow-up with Dr. Kermit Meneses in 3 to 4 months postdischarge    Total face-to-face time spent with patient today was approximately 30 minutes; more than 50% of my time was spent counseling patient on: preparation to see patient via chart review of resulted laboratory values, radiographic imaging, prescribed medications, and impairment of involved organ systems. Time also spent on medical examination, placing appropriate orders for testing/medications, communicating with other health care providers, counseling/educating the patient/family, and care coordination.    *This note was created using voice recognition transcription software. Despite proofreading, unintentional typographical errors may be present. Please contact the department of neurology with any questions or concerns.              ISIAH Borjas-CNP

## 2024-01-23 NOTE — PROGRESS NOTES
Physical Therapy    Physical Therapy Evaluation    Patient Name: Josh Kessler  MRN: [id number]  Today's Date: 1/23/2024   Time Calculation  Start Time: 1004  Stop Time: 1026  Time Calculation (min): 22 min    Assessment/Plan   PT Assessment  PT Assessment Results: Decreased mobility  End of Session Communication: Bedside nurse  End of Session Patient Position: Bed, 2 rail up, Alarm off, not on at start of session (All needs in reach and no complaints noted)  IP OR SWING BED PT PLAN  Inpatient or Swing Bed: Inpatient  PT Plan  Treatment/Interventions: Bed mobility, Transfer training, Gait training, Stair training  PT Plan: Skilled PT  PT Frequency: 4 times per week  PT Discharge Recommendations: Low intensity level of continued care  PT - OK to Discharge: Yes (to next level of care once cleared by medical team)    Subjective     Current Problem:  Patient Active Problem List   Diagnosis    Acute pharyngitis    Basal cell adenoma    Biceps tendinitis of both shoulders    Bilateral hearing loss    Borderline hyperlipidemia    Cerebral palsy (CMS/HCC)    Closed fracture of nasal bones    Deviated nasal septum    History of Mohs micrographic surgery for skin cancer    Low back pain    Neck pain    Osteoarthritis of finger of right hand    Primary osteoarthritis of right knee    Wound, open, forehead    Protein-calorie malnutrition, unspecified severity (CMS/HCC)    Cerebrovascular accident (CVA), unspecified mechanism (CMS/HCC)     General Visit Information:  General  Reason for Referral: PT Eval and Treat  Referred By: Ranjan Goncalves DO  Past Medical History Relevant to Rehab: 73-year-old male with medical history significant for cerebral palsy, protein calorie malnutrition, BPH, hyperlipidemia presenting to [facility] due to right sided weakness. Pt noted that he woke up this morning and felt like his right leg was stuck to the floor and he was unable to move his leg which prompted him to come to the ED for further  evaluation. Pt dx'd with Subacute small left thalamic infarct.  Prior to Session Communication: Bedside nurse  Patient Position Received: Bed, 2 rail up, Alarm off, not on at start of session (Agreeable to PT)    Home Living:  Home Living  Home Adaptive Equipment:  (5 rollators, RW, 2 WCs)  Home Living Comments: Pt lives home alone, with many friends and family around for support, in a 1 story house with 4 LAZARO with HR. Bathroom has a walk in shower with and grab bar, high toilet and grab bar. Laundry on 1st floor as well.    Prior Level of Function:  Prior Function Per Pt/Caregiver Report  Level of [place]: Independent with ADLs and functional transfers, Independent with homemaking with ambulation ((+) driving)    Precautions:  Precautions  Precautions Comment: Fall precautions    Objective     Pain:  Pain Assessment  Pain Assessment:  (0/10)    Cognition:  Cognition  Overall Cognitive Status: Within Functional Limits    General Assessments:  General Observation  General Observation: Pt reports his current function is close to his baseline   Sensation  Light Touch:  (Pt reports slight residual tingling in R lips/cheek area and R thumb)  Strength  Strength Comments: B LE ROM and strength slightly limited at baseline 2/2 cerebral palsy, R LE is slightly weaker than L LE which pt reports is also his baseline  Dynamic Sitting Balance  Dynamic Sitting-Comments: Good  Dynamic Standing Balance  Dynamic Standing-Comments: Fair- with RW    Functional Assessments:     Bed Mobility  Bed Mobility:  (supine <> sitting: independent)  Transfers  Transfer:  (STS from EOB: SBA with pt pulling up from RW)  Ambulation/Gait Training  Ambulation/Gait Training Performed:  (Pt was able to amb 25' x 2 using RW, pt demonstrates gait mechanics consistent with CP presenting with reduced B LE stength length/ clearance, very narrow ISABEL, and dragging B LE through with increased effort to lift each. Pt states this is his baseline)    Outcome  Measures:  [facility] Basic Mobility  Turning from your back to your side while in a flat bed without using bedrails: None  Moving from lying on your back to sitting on the side of a flat bed without using bedrails: None  Moving to and from bed to chair (including a wheelchair): A little  Standing up from a chair using your arms (e.g. wheelchair or bedside chair): A little  To walk in hospital room: A little  Climbing 3-5 steps with railing: A little  Basic Mobility - Total Score: 20    Goals:  Encounter Problems       Encounter Problems (Active)       PT Problem       STG - Pt will transfer STS with mod I  (Progressing)       Start:  01/23/24    Expected End:  02/06/24            STG - Pt will amb 30' using RW with SUP  (Progressing)       Start:  01/23/24    Expected End:  02/06/24            STG -  Pt will navigate 4 stairs using HR with SBA  (Progressing)       Start:  01/23/24    Expected End:  02/06/24                 Education Documentation  Precautions, taught by Sadie Hudson PT at 1/23/2024 12:00 PM.  Learner: Patient  Readiness: Acceptance  Method: Explanation  Response: Verbalizes Understanding    Mobility Training, taught by Sadie Hudson PT at 1/23/2024 12:00 PM.  Learner: Patient  Readiness: Acceptance  Method: Explanation  Response: Verbalizes Understanding    Education Comments  No comments found.

## 2024-01-23 NOTE — HH CARE COORDINATION
Home Care received a Referral for Physical Therapy. We have processed the referral for a Start of Care on 1/24-1/25.     If you have any questions or concerns, please feel free to contact us at [phone]. Follow the prompts, enter your five digit zip code, and you will be directed to your care team on WEST 3.

## 2024-01-23 NOTE — PROGRESS NOTES
01/23/24 1348   Discharge Planning   Home or Post Acute Services In home services   Type of Home Care Services Home OT;Home PT   Patient expects to be discharged to: Home with [facility]   Patient Choice   Provider Choice list and CMS website (https://medicare.gov/care-compare#search) for post-acute Quality and Resource Measure Data were provided and reviewed with: Patient  ([facility] list provided 1/23)     [facility] PT: 20 OT: 22, with care team recommendations for low intensity therapy at discharge. Met with patient at bedside to follow up on discharge plan. Discussed home health care with patient. Patient agreeable to home health care. [place] of choice explained and home health care list provided to patient. Patient states he will review list.     Addendum 1508: Patient states his preference is [facility]. MD updated.     Addendum 1519: Patient has written discharge order. Secure chat sent to [facility] to update on discharge for today.

## 2024-01-23 NOTE — DISCHARGE SUMMARY
Discharge Diagnosis  Cerebrovascular accident (CVA), unspecified mechanism (CMS/HCC)    Issues Requiring Follow-Up  Left thalamic ischemic stroke    Discharge Meds     Your medication list        START taking these medications        Instructions Last Dose Given Next Dose Due   aspirin 81 mg chewable tablet  Start taking on: January 24, 2024      Chew 1 tablet (81 mg) once daily. Do not start before January 24, 2024.       atorvastatin 40 mg tablet  Commonly known as: Lipitor      Take 1 tablet (40 mg) by mouth once daily at bedtime.       clopidogrel 75 mg tablet  Commonly known as: Plavix  Start taking on: January 24, 2024      Take 1 tablet (75 mg) by mouth once daily for 20 days. Do not start before January 24, 2024.                 Where to Get Your Medications        These medications were sent to [facility][id number] - [facility] [address] AT [facility]  [address], [place] [address]      Phone: [phone]   aspirin 81 mg chewable tablet  atorvastatin 40 mg tablet  clopidogrel 75 mg tablet         Test Results Pending At Discharge  Pending Labs       No current pending labs.            Hospital Course   Josh Kessler is a 73 y.o. male with a PMHx of cerebral palsy presenting with symptoms of on/off weakness and numbness for the past week. He states prior to these symptoms occurring he recalled having the worst H/A of his life around [place]. He states the H/A resolved a few days later. He states since Thursday he has been feeling weakness in his R extremity along with right lip and thumb numbness. He also reports a right lip droop that occurred along with these symptoms but resolved a few days later. He states this R extremity weakness is different from his typical baseline with his hx of cerebral palsy. He denies acute vision changes, chest pain, palpations, N/V, trouble with speech/ swallowing.  On initial valuation patient is NIH  score was 2.  CT scan was obtained which showed evidence of left thalamic ischemic stroke.  Neurology was consulted.  Patient was seen and examined by neurology patient was started on Plavix for a total of 21 days and aspirin 81 mg daily as well as atorvastatin 40 mg daily.  Echocardiogram was completed and can be discussed with his PCP on follow-up.  Patient was seen and examined by PT OT and deemed well enough to return home with home health care.  The patient is to follow-up with neurology within 1 month of discharge and his PCP within 2 weeks of discharge.  The patient is currently medically stable for discharge at this time.    Pertinent Physical Exam At Time of Discharge  Physical Exam  Physical Exam:   General appearance: no acute distress, well-appearing   HEENT: Atraumatic/normocephalic, moist mucosa  Neck: supple without obvious goiter, JVD not appreciated  Respiratory: good air movement, appropriate respiratory effort, no wheezing or crackles  Cardiovascular: regular rate, regular rhythm, no peripheral edema  Abdomen: no organomegaly, no tenderness to palpation in all quadrants  Extremities: strong peripheral pulses, no grossly obvious deformities, r. Leg weakness better from prior exam  Skin: intact, no rashes   Neurologic: Alert and oriented x 3  Psych: appropriate mood & affect, cooperative   Outpatient Follow-Up  Future Appointments   Date Time Provider Department Center   11/12/2024 10:30 AM DO Jairo Guzman[address]         Danny Reyna DO

## 2024-01-23 NOTE — PROGRESS NOTES
Speech-Language Pathology    Inpatient Speech-Language Pathology Clinical Swallow Evaluation    Patient Name: Josh Kessler  MRN: [id number]  Today's Date: 1/23/2024   Time Calculation  Start Time: 0845  Stop Time: 0903  Time Calculation (min): 18 min     Subjective   Pt admit to [facility] from home for stroke work-up. Per H&P, He states since Thursday he has been feeling weakness in his R extremity along with right lip and thumb numbness. He also reports a right lip droop that occurred along with these symptoms but resolved a few days later. He states this R extremity weakness is different from his typical baseline with his hx of cerebral palsy.   Today, pt seen bedside, he is upright, finished his breakfast, denies dysphagia. He passed RN screen and is on regular textures, thin liquids. Speech is intelligible, voice is audible, pt follows commands, makes eye contact, easily engages in conversation, answers questions easily, turn-taking WFL in conversation.       General Visit Information:  Patient Class: Inpatient  Living Environment: Home  Reason for Referral: assess oropharyngeal swallow  [employer] Diet: regular textures, thin liquids  Current Diet : regular textures, thin liquids per orders  Dysphagia Diagnosis: Within functional limits    CXR 1/22  IMPRESSION:  No detectable active cardiopulmonary disease.    MR Brain wo IV contrast  IMPRESSION:  Acute to early subacute infarct in the left thalamus and additional  punctate foci of acute to early subacute infarction in the left  occipital lobe.      Suspected occlusion of the proximal left posterior cerebral artery at  the level of proximal P2.      Diffusely decreased flow enhancement of the cervical left vertebral  artery, which may be due to hypoplasia, however superimposed stenosis  not excluded. This may be confirmed with CT angiogram of head and  neck. Anmol Medeiros discussed the significance and urgency of this  critical finding via Epic chat with LAINE  Neurology  on 1/23/2024 at  5:25 am.  (**-RCF-**) Findings:  See findings.    Current Problem:   1. Cerebrovascular accident (CVA), unspecified mechanism (CMS/HCC)  Transthoracic Echo (TTE) Complete    Transthoracic Echo (TTE) Complete    Holter Or Event Cardiac Monitor    Holter Or Event Cardiac Monitor    CANCELED: Transthoracic Echo (TTE) Complete    CANCELED: Transthoracic Echo (TTE) Complete      2. AF (paroxysmal atrial fibrillation) (CMS/HCC)  Transthoracic Echo (TTE) Complete    Transthoracic Echo (TTE) Complete    CANCELED: Transthoracic Echo (TTE) Complete    CANCELED: Transthoracic Echo (TTE) Complete      3. Transient cerebral ischemia, unspecified type  Holter Or Event Cardiac Monitor    Holter Or Event Cardiac Monitor        Recommendations:  Solid Diet Recommendations : Regular (IDDSI Level 7)  Liquid Diet Recommendations: Thin (IDDSI Level 0)  Compensatory Swallowing Strategies: Upright 90 degrees as possible for all oral intake, Remain upright for 20-30 minutes after meals, Small bites/sips, Eat/feed slowly    Assessment:   Intact oral phase of the swallow, and suspected functional pharyngeal swallow for an oral diet given clinical bedside indicators.   -Oral motor ROM is WFL; pt is managing secretions, tongue protrudes to midline. Speech is intelligible. PO trials thin liquids by cup and straw, soft solids, regular/cookie solids.  -ORAL PHASE: labial seal is intact, there is no loss of the oral bolus. Mastication of solids on natura dentition is effective. Oral bolus formation and manipulation mWFL; no oral residue, no oral pocketing. Oral clearance achieved.    -PHARYNGEAL PHASE: no overt s/s aspiration with all PO trials today, pt reports ease of PO intake with breakfast meal.    -Will suggest pt continue an oral diet, no texture modifications. If any further concerns or changes in pt's ability to safely chew/swallow, please consult ST for further testing.     Plan:  SLP Plan: No skilled SLP  No  Skilled SLP: At baseline function, No acute SLP goals identified  Discussed Risks/Benefits: Yes, Patient  Patient/Caregiver Agreeable: Yes  SLP - OK to Discharge: Yes    Baseline Assessment:  Respiratory Status: Room air  Patient Positioning: Upright in Bed  Baseline Vocal Quality: Normal  Volitional Cough: Strong  Volitional Swallow: Within Functional Limits    Pain:  Pain Assessment: 0-10  Pain Score: 0 - No pain     Oral/Motor Assessment:  Dentition: Adequate/Natural  Oral Motor: Within Functional Limits  Facial Symmetry: Within Functional Limits  Labial ROM: Within Functional Limits  Lingual ROM: Within Functional Limits  Vocal Quality: Within Functional Limits  Intelligibility: Intelligible  Breath Support: Adequate for speech    Inpatient:  Education Documentation  SLP has provided pt and caregiver/RN with the results and recommendations of this session.

## 2024-01-23 NOTE — PROGRESS NOTES
Occupational Therapy    Occupational Therapy    Evaluation    Patient Name: Josh Kessler  MRN: [id number]  Today's Date: 1/23/2024  Time Calculation  Start Time: 1005  Stop Time: 1026  Time Calculation (min): 21 min    Assessment  IP OT Assessment  OT Assessment: decreased adls/iadls  Prognosis: Good  Medical Staff Made Aware: Yes  End of Session Communication: Bedside nurse  End of Session Patient Position: Bed, 2 rail up, Alarm off, not on at start of session (call light in reach. all needs mets)    Plan:  Treatment Interventions: ADL retraining, Functional transfer training, UE strengthening/ROM  OT Frequency: 3 times per week  OT Discharge Recommendations: Low intensity level of continued care  OT - OK to Discharge: Yes (to next level of care once cleared by medical team)    Subjective     Current Problem:  1. Cerebrovascular accident (CVA), unspecified mechanism (CMS/HCC)  Transthoracic Echo (TTE) Complete    Transthoracic Echo (TTE) Complete    Holter Or Event Cardiac Monitor    Holter Or Event Cardiac Monitor    CANCELED: Transthoracic Echo (TTE) Complete    CANCELED: Transthoracic Echo (TTE) Complete      2. AF (paroxysmal atrial fibrillation) (CMS/HCC)  Transthoracic Echo (TTE) Complete    Transthoracic Echo (TTE) Complete    CANCELED: Transthoracic Echo (TTE) Complete    CANCELED: Transthoracic Echo (TTE) Complete      3. Transient cerebral ischemia, unspecified type  Holter Or Event Cardiac Monitor    Holter Or Event Cardiac Monitor          General:  General  Reason for Referral: OT eval and treat- Pt. admitted with increased rt. sided weakness x 1 week.( Pt. has cerebral palsy with rt. sided weakness at baseline) MRI brain=subacute small lt. thalamic infarct  Referred By: Ranjan Goncalves DO  Past Medical History Relevant to Rehab: hld, cerebral palsy, protein calorie malnutrition, bph  Prior to Session Communication: Bedside nurse (cleared for therapy evaluation per RN)  Patient Position Received: Bed,  2 rail up, Alarm off, not on at start of session  General Comment: pt. pleasant and cooperative.    Precautions:  Medical Precautions: Fall precautions    Pain: 0/10     Objective     Cognition:  Overall Cognitive Status: Within Functional Limits  Orientation Level: Oriented X4    Home Living:  Home Living Comments: Pt. lives alone in 1 floor home with 4 step entry with bilat. railings. 1st floor laundry. Bathroom set-up: shower stall with seat/grab bar/hhs; elevated toilet with grab bar. Pt. has 2 lift chairs.     Prior Function:  Level of [place]: Independent with homemaking with ambulation, Independent with ADLs and functional transfers  Ambulatory Assistance:  (ind. with rollator. pt. has 5 rollators and regular WC)  Hand Dominance: Left    IADL History:  IADL Comments: Pt. ind. with all homemaking, driving, shopping.    ADL:  ADL Comments: pt. ind. tying shoelaces while bending over seated at eob. no lob noted. pt. ind. with feeding/grooming. anticipate sba with toileting on commode.    Bed Mobility/Transfers:   Bed Mobility  Bed Mobility:  (ind. supine<>sit at eob.)  Transfers  Transfer:  (sba sit to stand.)    Ambulation/Gait Training:  Ambulation/Gait Training  Ambulation/Gait Training Performed:  (sba with ambulating in room with rolling walker.)    Sensation:  Light Touch:  (pt. reports tingling rt. side of mouth/lips and rt. thumb which has improved.)    Strength:  Strength Comments: bue arom wfl except mild limitation in lt.shoulder due to rotator cuff injury. bilat. shoulder strength 4-/5; distally 4/5    Hand Function:  Hand Function  Coordination:  (mild impairment in rt. hand compared to lt., but still wfl)    Outcome Measures: [facility] Daily Activity  Putting on and taking off regular lower body clothing: None  Bathing (including washing, rinsing, drying): A little  Putting on and taking off regular upper body clothing: None  Toileting, which includes using toilet, bedpan or urinal: A  little  Taking care of personal grooming such as brushing teeth: None  Eating Meals: None  Daily Activity - Total Score: 22     EDUCATION:       Goals:   Encounter Problems       Encounter Problems (Active)       OT Goals       OT Goal 1 (Progressing)       Start:  01/23/24    Expected End:  02/06/24       Pt. will be ind. With toileting using adaptive equipment as needed.           OT Goal 2 (Progressing)       Start:  01/23/24    Expected End:  02/06/24       Pt. will perform static/dynamic standing balance ind.'ly in preparation for iadl tasks.           OT Goal 3 (Progressing)       Start:  01/23/24    Expected End:  02/06/24       Pt. will tolerate 5-7 min. of standing tasks with mod. Ind. in preparation for grooming at sink.           OT Goal 4 (Progressing)       Start:  01/23/24    Expected End:  02/06/24       Pt.  will perform BUE therapeutic exercises x 10 min. as tolerated to improve endurance for functional transfers/self-care tasks.           OT Goal 5 (Progressing)       Start:  01/23/24    Expected End:  02/06/24       Pt. will perform functional mobility with rolling walker ind.'ly in prep. for homemaking tasks.

## 2024-01-24 ENCOUNTER — APPOINTMENT (OUTPATIENT)
Dept: CARDIOLOGY | Facility: HOSPITAL | Age: 74
DRG: 065 | End: 2024-01-24
Payer: MEDICARE

## 2024-01-24 VITALS
RESPIRATION RATE: 16 BRPM | HEART RATE: 63 BPM | OXYGEN SATURATION: 93 % | TEMPERATURE: 96.8 F | BODY MASS INDEX: 17.98 KG/M2 | DIASTOLIC BLOOD PRESSURE: 89 MMHG | WEIGHT: 118.61 LBS | SYSTOLIC BLOOD PRESSURE: 153 MMHG | HEIGHT: 68 IN

## 2024-01-24 PROBLEM — I63.9 CEREBROVASCULAR ACCIDENT (CVA), UNSPECIFIED MECHANISM (MULTI): Status: RESOLVED | Noted: 2024-01-22 | Resolved: 2024-01-24

## 2024-01-24 LAB
AORTIC VALVE MEAN GRADIENT: 3 MMHG
AORTIC VALVE PEAK VELOCITY: 1.16 M/S
AV PEAK GRADIENT: 5.4 MMHG
AVA (PEAK VEL): 2.4 CM2
AVA (VTI): 2.66 CM2
EJECTION FRACTION APICAL 4 CHAMBER: 74.8
EJECTION FRACTION: 68 %
GLUCOSE BLD MANUAL STRIP-MCNC: 92 MG/DL (ref 74–99)
LEFT ATRIUM VOLUME AREA LENGTH INDEX BSA: 9.2 ML/M2
LEFT VENTRICLE INTERNAL DIMENSION DIASTOLE: 3.74 CM (ref 3.5–6)
LEFT VENTRICULAR OUTFLOW TRACT DIAMETER: 1.9 CM
MITRAL VALVE E/A RATIO: 0.72
RIGHT VENTRICLE FREE WALL PEAK S': 15.4 CM/S
TRICUSPID ANNULAR PLANE SYSTOLIC EXCURSION: 2.7 CM

## 2024-01-24 PROCEDURE — 93248 EXT ECG>7D<15D REV&INTERPJ: CPT | Performed by: INTERNAL MEDICINE

## 2024-01-24 PROCEDURE — 93306 TTE W/DOPPLER COMPLETE: CPT

## 2024-01-24 PROCEDURE — 93246 EXT ECG>7D<15D RECORDING: CPT

## 2024-01-24 PROCEDURE — 2500000001 HC RX 250 WO HCPCS SELF ADMINISTERED DRUGS (ALT 637 FOR MEDICARE OP): Performed by: NURSE PRACTITIONER

## 2024-01-24 PROCEDURE — 93306 TTE W/DOPPLER COMPLETE: CPT | Performed by: INTERNAL MEDICINE

## 2024-01-24 PROCEDURE — 82947 ASSAY GLUCOSE BLOOD QUANT: CPT

## 2024-01-24 PROCEDURE — 99232 SBSQ HOSP IP/OBS MODERATE 35: CPT

## 2024-01-24 RX ADMIN — CLOPIDOGREL 75 MG: 75 TABLET ORAL at 10:09

## 2024-01-24 RX ADMIN — ASPIRIN 81 MG 81 MG: 81 TABLET ORAL at 10:09

## 2024-01-24 ASSESSMENT — COGNITIVE AND FUNCTIONAL STATUS - GENERAL
STANDING UP FROM CHAIR USING ARMS: A LITTLE
MOBILITY SCORE: 20
CLIMB 3 TO 5 STEPS WITH RAILING: A LOT
WALKING IN HOSPITAL ROOM: A LITTLE

## 2024-01-24 ASSESSMENT — PAIN SCALES - GENERAL: PAINLEVEL_OUTOF10: 0 - NO PAIN

## 2024-01-24 NOTE — PROGRESS NOTES
Jakob Kessler is a 73 y.o. male on day 2 of admission presenting with Cerebrovascular accident (CVA), unspecified mechanism (CMS/HCC).      Subjective   Patient seen and examined this AM. No acute events over night. Echo completed this am, stable to return home.        Objective     Last Recorded Vitals  /89 (BP Location: Left arm, Patient Position: Lying)   Pulse 63   Temp 36 °C (96.8 °F) (Temporal)   Resp 16   Wt 53.8 kg (118 lb 9.7 oz)   SpO2 93%   Intake/Output last 3 Shifts:  No intake or output data in the 24 hours ending 01/24/24 1106    Admission Weight  Weight: 53.8 kg (118 lb 9.7 oz) (01/22/24 1442)    Daily Weight  01/22/24 : 53.8 kg (118 lb 9.7 oz)    Image Results  Transthoracic Echo (TTE) Complete      [facility], [address] [place] [id number] [phone] and                                  Fax [phone]    TRANSTHORACIC ECHOCARDIOGRAM REPORT       Patient Name:      JAKOB KESSLER     Reading Physician:    [id number] Mariana Riley MD  Study Date:        1/24/2024           Ordering Provider:    [id number] PUJA BALLARD  MRN/PID:           [id number]            Fellow:  Accession#:        [id number]        Nurse:                Lio Bowman RN  Date of Birth/Age: 1950 / 73 years Sonographer:          ANJEL Lopez RDCS  Gender:            M                   Additional Staff:  Height:            170.18 cm           Admit Date:           1/22/2024  Weight:            53.52 kg            Admission Status:     Observation -                                                               Priority discharge  BSA:               1.62 m2             Encounter#:           [id number]                                         Department Location:  [facility]  Blood Pressure: 153 /89 mmHg    Study Type:    TRANSTHORACIC ECHO (TTE) COMPLETE  Diagnosis/ICD: Other cerebral infarction-I63.89  Indication:     Cerebrovascular Accident  CPT Code:      Echo Complete w Full Doppler-[id number]    Patient History:  Pertinent History: CVA and Hyperlipidemia.    Study Detail: The following Echo studies were performed: 2D, M-Mode, Doppler and                color flow. Technically challenging study due to body habitus and                Prominent rib artifacts/limitations. Agitated saline used as a                contrast agent for intraseptal flow evaluation.       PHYSICIAN INTERPRETATION:  Left Ventricle: The left ventricular systolic function is normal, with an estimated ejection fraction of 65%. There are no regional wall motion abnormalities. The left ventricular cavity size is normal. Spectral Doppler shows an impaired relaxation pattern of left ventricular diastolic filling.  Left Atrium: The left atrium is normal in size. A bubble study using agitated saline was performed. Bubble study is negative.  Right Ventricle: The right ventricle is normal in size. There is normal right ventricular global systolic function.  Right Atrium: The right atrium is normal in size.  Aortic Valve: The aortic valve was not well visualized. There is no evidence of aortic valve regurgitation. The peak instantaneous gradient of the aortic valve is 5.4 mmHg. The mean gradient of the aortic valve is 3.0 mmHg.  Mitral Valve: The mitral valve is normal in structure. There is no evidence of mitral valve regurgitation.  Tricuspid Valve: The tricuspid valve is structurally normal. No evidence of tricuspid regurgitation.  Pulmonic Valve: The pulmonic valve is not well visualized. There is no indication of pulmonic valve regurgitation.  Pericardium: There is no pericardial effusion noted.  Aorta: The aortic root is normal.       CONCLUSIONS:   1. Left ventricular systolic function is normal with a 65% estimated ejection fraction.   2. Spectral Doppler shows an impaired relaxation pattern of left ventricular diastolic filling.    QUANTITATIVE DATA  SUMMARY:  2D MEASUREMENTS:                           Normal Ranges:  Ao Root d:     3.30 cm   (2.0-3.7cm)  LAs:           2.40 cm   (2.7-4.0cm)  IVSd:          0.88 cm   (0.6-1.1cm)  LVPWd:         0.74 cm   (0.6-1.1cm)  LVIDd:         3.74 cm   (3.9-5.9cm)  LVIDs:         1.96 cm  LV Mass Index: 52.6 g/m2  LV % FS        47.6 %    LA VOLUME:                                Normal Ranges:  LA Vol A4C:        10.4 ml    (22+/-6mL/m2)  LA Vol A2C:        18.1 ml  LA Vol BP:         14.9 ml  LA Vol Index A4C:  6.4ml/m2  LA Vol Index A2C:  11.2 ml/m2  LA Vol Index BP:   9.2 ml/m2  LA Area A4C:       7.0 cm2  LA Area A2C:       10.0 cm2  LA Major Axis A4C: 4.0 cm  LA Major Axis A2C: 4.7 cm  LA Volume Index:   8.0 ml/m2    RA VOLUME BY A/L METHOD:                        Normal Ranges:  RA Area A4C: 12.0 cm2    M-MODE MEASUREMENTS:                   Normal Ranges:  Ao Root: 3.30 cm (2.0-3.7cm)  LAs:     3.20 cm (2.7-4.0cm)    AORTA MEASUREMENTS:                     Normal Ranges:  Asc Ao, d: 3.20 cm (2.1-3.4cm)    LV SYSTOLIC FUNCTION BY 2D PLANIMETRY (MOD):                      Normal Ranges:  EF-A4C View: 74.8 % (>=55%)  EF-A2C View: 60.1 %  EF-Biplane:  68.2 %    LV DIASTOLIC FUNCTION:                         Normal Ranges:  MV Peak E:    0.56 m/s (0.7-1.2 m/s)  MV Peak A:    0.77 m/s (0.42-0.7 m/s)  E/A Ratio:    0.72     (1.0-2.2)  MV lateral e' 0.12 m/s  MV medial e'  0.08 m/s    MITRAL VALVE:                  Normal Ranges:  MV DT: 340 msec (150-240msec)    AORTIC VALVE:                                    Normal Ranges:  AoV Vmax:                1.16 m/s (<=1.7m/s)  AoV Peak P.4 mmHg (<20mmHg)  AoV Mean PG:             3.0 mmHg (1.7-11.5mmHg)  LVOT Max Tariq:            0.98 m/s (<=1.1m/s)  AoV VTI:                 19.40 cm (18-25cm)  LVOT VTI:                18.20 cm  LVOT Diameter:           1.90 cm  (1.8-2.4cm)  AoV Area, VTI:           2.66 cm2 (2.5-5.5cm2)  AoV Area,Vmax:           2.40 cm2  (2.5-4.5cm2)  AoV Dimensionless Index: 0.94       RIGHT VENTRICLE:  RV Basal 2.86 cm  RV Mid   2.24 cm  RV Major 5.9 cm  TAPSE:   27.0 mm  RV s'    0.15 m/s    PULMONIC VALVE:                       Normal Ranges:  PV Max Tariq: 1.0 m/s  (0.6-0.9m/s)  PV Max P.3 mmHg       [id number] Mariana Riley MD  Electronically signed on 2024 at 10:04:31 AM       ** Final **      Physical Exam  General appearance: no acute distress, well-appearing   HEENT: Atraumatic/normocephalic, moist mucosa  Neck: supple without obvious goiter, JVD not appreciated  Respiratory: good air movement, appropriate respiratory effort, no wheezing or crackles  Cardiovascular: regular rate, regular rhythm, no peripheral edema  Abdomen: no organomegaly, no tenderness to palpation in all quadrants  Extremities: strong peripheral pulses, no grossly obvious deformities, r. Leg weakness better from prior exam  Skin: intact, no rashes   Neurologic: Alert and oriented x 3  Psych: appropriate mood & affect, cooperative   Relevant Results               Assessment/Plan   This patient currently has cardiac telemetry ordered; if you would like to modify or discontinue the telemetry order, click here to go to the orders activity to modify/discontinue the order.    Active Problems:  There are no active Hospital Problems.    #Left thalamic stroke  -Echo completed this a.m., stable for discharge  -Plavix for total of 21 days and aspirin 81 mg daily  -Patient follow-up with urology within 3 weeks of discharge  -Patient to follow-up with PCP in 2 weeks  -Return home this morning with home health care.              Danny Reyna, DO

## 2024-01-24 NOTE — CARE PLAN
The patient's goals for the shift include      The clinical goals for the shift include rest and comfort      Problem: General Stroke  Goal: Establish a mutual long term goal with patient by discharge  Outcome: Progressing  Goal: Demonstrate improvement in neurological exam throughout the shift  Outcome: Progressing  Goal: Maintain BP within ordered limits throughout shift  Outcome: Progressing  Goal: Participate in treatment (ie., meds, therapy) throughout shift  Outcome: Progressing  Goal: No symptoms of aspiration throughout shift  Outcome: Progressing  Goal: No symptoms of hemorrhage throughout shift  Outcome: Progressing  Goal: Tolerate enteral feeding throughout shift  Outcome: Progressing  Goal: Decreased nausea/vomiting throughout shift  Outcome: Progressing  Goal: Controlled blood glucose throughout shift  Outcome: Progressing  Goal: Out of bed three times today  Outcome: Progressing

## 2024-01-25 ENCOUNTER — HOME CARE VISIT (OUTPATIENT)
Dept: HOME HEALTH SERVICES | Facility: HOME HEALTH | Age: 74
End: 2024-01-25
Payer: MEDICARE

## 2024-01-25 ENCOUNTER — PATIENT OUTREACH (OUTPATIENT)
Dept: CARE COORDINATION | Facility: CLINIC | Age: 74
End: 2024-01-25
Payer: MEDICARE

## 2024-01-25 VITALS
OXYGEN SATURATION: 99 % | DIASTOLIC BLOOD PRESSURE: 80 MMHG | SYSTOLIC BLOOD PRESSURE: 130 MMHG | HEART RATE: 68 BPM | TEMPERATURE: 97.3 F

## 2024-01-25 PROCEDURE — G0151 HHCP-SERV OF PT,EA 15 MIN: HCPCS

## 2024-01-25 PROCEDURE — 0023 HH SOC

## 2024-01-25 ASSESSMENT — ENCOUNTER SYMPTOMS
DENIES PAIN: 1
OCCASIONAL FEELINGS OF UNSTEADINESS: 1
PERSON REPORTING PAIN: PATIENT

## 2024-01-25 ASSESSMENT — ACTIVITIES OF DAILY LIVING (ADL): ENTERING_EXITING_HOME: CONTACT GUARD ASSIST

## 2024-01-25 NOTE — PROGRESS NOTES
Discharge Facility:  [facility]  Discharge Diagnosis: CVA  Admission Date: 1/22/24  Discharge Date: 1/24/24    PCP Appointment Date: 2/13/24 patient prefers to have this appt even though outside of 2 weeks  Specialist Appointment Date: 7/2/24 Dr Meneses  Hospital Encounter and Summary: Linked   See discharge assessment below for further details    Engagement  Call Start Time: 1135 (1/25/2024 11:39 AM)    Medications  Medications reviewed with patient/caregiver?: Yes (1/25/2024 11:39 AM)  Is the patient having any side effects they believe may be caused by any medication additions or changes?: No (1/25/2024 11:39 AM)  Does the patient have all medications ordered at discharge?: Yes (1/25/2024 11:39 AM)  Care Management Interventions: No intervention needed (1/25/2024 11:39 AM)  Prescription Comments: On aspirin 81mg daily, plavix 75mg daily for 21 days, atorvastatin 40mg daily (1/25/2024 11:39 AM)  Is the patient taking all medications as directed (includes completed medication regime)?: Yes (1/25/2024 11:39 AM)  Care Management Interventions: Provided patient education (1/25/2024 11:39 AM)  Medication Comments: No issues obtaining or affording medications (1/25/2024 11:39 AM)    Appointments  Does the patient have a primary care provider?: Yes (1/25/2024 11:39 AM)  Care Management Interventions: Verified appointment date/time/provider (1/25/2024 11:39 AM)  Has the patient kept scheduled appointments due by today?: Yes (1/25/2024 11:39 AM)  Care Management Interventions: Advised patient to keep appointment (1/25/2024 11:39 AM)    Self Management  What is the home health agency?: [facility] (1/25/2024 11:39 AM)  Has home health visited the patient within 72 hours of discharge?: Yes (1/25/2024 11:39 AM)    Patient Teaching  Does the patient have access to their discharge instructions?: Yes (1/25/2024 11:39 AM)  Care Management Interventions: Reviewed instructions with patient (1/25/2024 11:39 AM)  What is the patient's  perception of their health status since discharge?: Improving (1/25/2024 11:39 AM)  Is the patient/caregiver able to teach back the hierarchy of who to call/visit for symptoms/problems? PCP, Specialist, Home Health nurse, Urgent Care, ED, 911: Yes (1/25/2024 11:39 AM)  Patient/Caregiver Education Comments: Aware to monitor for new signs of stroke, use caution when ambulating. (1/25/2024 11:39 AM)    Wrap Up  Wrap Up Additional Comments: Josh is doing well after his hospital stay, has some numbness residual in his R side face and feels like novocaine, also some heaviness of his R leg. Has walkers and grab bars all throughout house. (1/25/2024 11:39 AM)  Call End Time: 1141 (1/25/2024 11:39 AM)

## 2024-01-26 NOTE — HOME HEALTH
"Friendly patient with cerebral palsy experienced terrible headache around 2023 which resolved. That was followed by a few days of R thumb numbness/tingling, facial numbness, and the right leg feeling \"heavy\". Stated he felt like his R foot was glued to the floor. Went to the hospital  where he was found to have experienced L Thalmic ischemic stroke. Started on 3 new medications but was previously not taking any.     Prior level of function was independent in the home with IADLs and ADLs. Cerebral Palsy mostly affects his right lower extremity. Uses rollators for mobility and safe ambulation. Electric carts for community distances at the grocery store. Previously driving. 3 steps to enter side door of home with grab bars. 1st floor laundry set up, but does go to the basement on occasion to clean and sort though some of his  wife's belongings. Patient previously was going to the gym and physical therapy at an outpatient basis as recently as 2023. He enjoyed the water therapy.    Patient demonstrated 1 unsafe stand to sit transfer this session where he almost fell out of the chair. Due to CP, walks on toes with forward leaning posture and slight scissoring. He was able to stand unsupported with both feet on the ground for short periods without loss of balance. Dynamic balance, reaching tasks, and tandem tasks are challenging. Patient also reported that he feels his legs are not as strong as they were several months ago. He will benefit from skilled physical therapy to improve static and dynamic standing balance, strengthen lower extremities, reduce risk of falls in the home, and for safe return to the community."

## 2024-01-28 LAB
ATRIAL RATE: 68 BPM
P AXIS: -7 DEGREES
P OFFSET: 183 MS
P ONSET: 145 MS
PR INTERVAL: 154 MS
Q ONSET: 222 MS
QRS COUNT: 11 BEATS
QRS DURATION: 82 MS
QT INTERVAL: 366 MS
QTC CALCULATION(BAZETT): 389 MS
QTC FREDERICIA: 381 MS
R AXIS: 19 DEGREES
T AXIS: 53 DEGREES
T OFFSET: 405 MS
VENTRICULAR RATE: 68 BPM

## 2024-01-29 ENCOUNTER — HOME CARE VISIT (OUTPATIENT)
Dept: HOME HEALTH SERVICES | Facility: HOME HEALTH | Age: 74
End: 2024-01-29
Payer: MEDICARE

## 2024-01-30 ENCOUNTER — HOME CARE VISIT (OUTPATIENT)
Dept: HOME HEALTH SERVICES | Facility: HOME HEALTH | Age: 74
End: 2024-01-30
Payer: MEDICARE

## 2024-01-30 PROCEDURE — G0157 HHC PT ASSISTANT EA 15: HCPCS

## 2024-01-30 ASSESSMENT — ENCOUNTER SYMPTOMS
PAIN: 1
PERSON REPORTING PAIN: PATIENT
HIGHEST PAIN SEVERITY IN PAST 24 HOURS: 7/10
PAIN LOCATION: GENERALIZED
PAIN LOCATION: BACK
PAIN SEVERITY GOAL: 3/10
SUBJECTIVE PAIN PROGRESSION: WAXING AND WANING
LOWEST PAIN SEVERITY IN PAST 24 HOURS: 4/10

## 2024-02-01 DIAGNOSIS — I63.9 CEREBROVASCULAR ACCIDENT (CVA), UNSPECIFIED MECHANISM (MULTI): Primary | ICD-10-CM

## 2024-02-01 ASSESSMENT — ACTIVITIES OF DAILY LIVING (ADL): OASIS_M1830: 03

## 2024-02-02 ENCOUNTER — HOME CARE VISIT (OUTPATIENT)
Dept: HOME HEALTH SERVICES | Facility: HOME HEALTH | Age: 74
End: 2024-02-02
Payer: MEDICARE

## 2024-02-02 PROCEDURE — G0157 HHC PT ASSISTANT EA 15: HCPCS

## 2024-02-02 ASSESSMENT — ENCOUNTER SYMPTOMS
PAIN: 1
PAIN SEVERITY GOAL: 3/10
LOWEST PAIN SEVERITY IN PAST 24 HOURS: 4/10
SUBJECTIVE PAIN PROGRESSION: WAXING AND WANING
PAIN LOCATION: RIGHT ARM
PERSON REPORTING PAIN: PATIENT
HIGHEST PAIN SEVERITY IN PAST 24 HOURS: 7/10
PAIN LOCATION: RIGHT LEG

## 2024-02-06 ENCOUNTER — HOME CARE VISIT (OUTPATIENT)
Dept: HOME HEALTH SERVICES | Facility: HOME HEALTH | Age: 74
End: 2024-02-06
Payer: MEDICARE

## 2024-02-06 PROCEDURE — G0157 HHC PT ASSISTANT EA 15: HCPCS

## 2024-02-06 ASSESSMENT — ENCOUNTER SYMPTOMS
PERSON REPORTING PAIN: PATIENT
PAIN LOCATION: RIGHT ARM
PAIN: 1
LOWEST PAIN SEVERITY IN PAST 24 HOURS: 4/10
PAIN LOCATION: RIGHT LEG
HIGHEST PAIN SEVERITY IN PAST 24 HOURS: 6/10
PAIN SEVERITY GOAL: 2/10
SUBJECTIVE PAIN PROGRESSION: WAXING AND WANING

## 2024-02-08 ENCOUNTER — PATIENT OUTREACH (OUTPATIENT)
Dept: CARE COORDINATION | Facility: CLINIC | Age: 74
End: 2024-02-08
Payer: MEDICARE

## 2024-02-08 ENCOUNTER — HOME CARE VISIT (OUTPATIENT)
Dept: HOME HEALTH SERVICES | Facility: HOME HEALTH | Age: 74
End: 2024-02-08
Payer: MEDICARE

## 2024-02-08 PROCEDURE — G0157 HHC PT ASSISTANT EA 15: HCPCS

## 2024-02-08 ASSESSMENT — ENCOUNTER SYMPTOMS
LOWEST PAIN SEVERITY IN PAST 24 HOURS: 3/10
SUBJECTIVE PAIN PROGRESSION: WAXING AND WANING
HIGHEST PAIN SEVERITY IN PAST 24 HOURS: 5/10
PAIN LOCATION: LEFT SHOULDER
PAIN: 1
PAIN LOCATION: RIGHT LEG
PERSON REPORTING PAIN: PATIENT
PAIN SEVERITY GOAL: 2/10
PAIN LOCATION: RIGHT ARM

## 2024-02-12 ENCOUNTER — HOME CARE VISIT (OUTPATIENT)
Dept: HOME HEALTH SERVICES | Facility: HOME HEALTH | Age: 74
End: 2024-02-12
Payer: MEDICARE

## 2024-02-12 PROCEDURE — G0157 HHC PT ASSISTANT EA 15: HCPCS

## 2024-02-12 ASSESSMENT — ENCOUNTER SYMPTOMS
SUBJECTIVE PAIN PROGRESSION: WAXING AND WANING
PAIN SEVERITY GOAL: 2/10
PERSON REPORTING PAIN: PATIENT
LOWEST PAIN SEVERITY IN PAST 24 HOURS: 3/10
PAIN: 1
PAIN LOCATION: RIGHT LEG
HIGHEST PAIN SEVERITY IN PAST 24 HOURS: 6/10
PAIN LOCATION: RIGHT ARM

## 2024-02-13 ENCOUNTER — OFFICE VISIT (OUTPATIENT)
Dept: PRIMARY CARE | Facility: CLINIC | Age: 74
End: 2024-02-13
Payer: MEDICARE

## 2024-02-13 VITALS
WEIGHT: 124.4 LBS | BODY MASS INDEX: 18.85 KG/M2 | HEIGHT: 68 IN | HEART RATE: 56 BPM | TEMPERATURE: 97.6 F | OXYGEN SATURATION: 96 % | SYSTOLIC BLOOD PRESSURE: 140 MMHG | DIASTOLIC BLOOD PRESSURE: 82 MMHG

## 2024-02-13 DIAGNOSIS — G80.9 CEREBRAL PALSY, UNSPECIFIED TYPE (MULTI): ICD-10-CM

## 2024-02-13 DIAGNOSIS — E46 PROTEIN-CALORIE MALNUTRITION, UNSPECIFIED SEVERITY (MULTI): ICD-10-CM

## 2024-02-13 DIAGNOSIS — I63.81 THALAMIC STROKE (MULTI): Primary | ICD-10-CM

## 2024-02-13 PROBLEM — I48.0 PAROXYSMAL ATRIAL FIBRILLATION (MULTI): Status: RESOLVED | Noted: 2024-02-13 | Resolved: 2024-02-13

## 2024-02-13 PROBLEM — I48.0 PAROXYSMAL ATRIAL FIBRILLATION (MULTI): Status: ACTIVE | Noted: 2024-02-13

## 2024-02-13 PROBLEM — N40.0 BENIGN PROSTATIC HYPERPLASIA: Status: ACTIVE | Noted: 2024-02-13

## 2024-02-13 PROCEDURE — 1036F TOBACCO NON-USER: CPT | Performed by: FAMILY MEDICINE

## 2024-02-13 PROCEDURE — 1159F MED LIST DOCD IN RCRD: CPT | Performed by: FAMILY MEDICINE

## 2024-02-13 PROCEDURE — 1126F AMNT PAIN NOTED NONE PRSNT: CPT | Performed by: FAMILY MEDICINE

## 2024-02-13 PROCEDURE — 1111F DSCHRG MED/CURRENT MED MERGE: CPT | Performed by: FAMILY MEDICINE

## 2024-02-13 PROCEDURE — 1160F RVW MEDS BY RX/DR IN RCRD: CPT | Performed by: FAMILY MEDICINE

## 2024-02-13 PROCEDURE — 99214 OFFICE O/P EST MOD 30 MIN: CPT | Performed by: FAMILY MEDICINE

## 2024-02-13 RX ORDER — ACETAMINOPHEN 325 MG/1
TABLET ORAL EVERY 6 HOURS PRN
COMMUNITY
Start: 2024-01-22

## 2024-02-13 RX ORDER — ONDANSETRON HYDROCHLORIDE 2 MG/ML
INJECTION, SOLUTION INTRAVENOUS EVERY 6 HOURS PRN
COMMUNITY
Start: 2024-01-22 | End: 2024-02-16 | Stop reason: ALTCHOICE

## 2024-02-13 RX ORDER — ENOXAPARIN SODIUM 100 MG/ML
INJECTION SUBCUTANEOUS
COMMUNITY
Start: 2024-01-22 | End: 2024-02-13 | Stop reason: ALTCHOICE

## 2024-02-13 ASSESSMENT — PAIN SCALES - GENERAL: PAINLEVEL: 0-NO PAIN

## 2024-02-13 ASSESSMENT — ENCOUNTER SYMPTOMS
LOSS OF SENSATION IN FEET: 0
OCCASIONAL FEELINGS OF UNSTEADINESS: 0
ENDOCRINE NEGATIVE: 1
CONSTITUTIONAL NEGATIVE: 1
CARDIOVASCULAR NEGATIVE: 1
RESPIRATORY NEGATIVE: 1
ARTHRALGIAS: 1
DEPRESSION: 0
PSYCHIATRIC NEGATIVE: 1

## 2024-02-13 NOTE — PROGRESS NOTES
"Subjective   Patient ID: Josh Kessler is a 73 y.o. male who presents for Hospital Follow-up ([facility] -1/19/2024 stroke).Date was 1/22/24 [facility]     Review of Systems   Constitutional: Negative.    Respiratory: Negative.     Cardiovascular: Negative.    Endocrine: Negative.    Musculoskeletal:  Positive for arthralgias.   Neurological:         Thalamic infarct left symptoms resolved   Psychiatric/Behavioral: Negative.         Objective   /82 (BP Location: Left arm)   Pulse 56   Temp 36.4 °C (97.6 °F) (Temporal)   Ht 1.727 m (5' 8\")   Wt 56.4 kg (124 lb 6.4 oz)   SpO2 96%   BMI 18.91 kg/m²     Physical Exam  Vitals and nursing note reviewed.   Constitutional:       Appearance: Normal appearance.   Cardiovascular:      Rate and Rhythm: Normal rate and regular rhythm.      Pulses: Normal pulses.      Heart sounds: Normal heart sounds.   Pulmonary:      Breath sounds: Normal breath sounds.   Neurological:      Mental Status: He is alert and oriented to person, place, and time.      Comments: Baseline with CP   Psychiatric:         Mood and Affect: Mood normal.         Behavior: Behavior normal.         Assessment/Plan patient seen here for follow-up after having suffered a thalamic infarct he was admitted at [facility].  We are able to review his MRIs and CAT scans.  He is completely recovered from the mild symptoms that he did have.  We reviewed his medications pre and post admission.  Will see him back as needed.  He has an appointment with neurology in July  Problem List Items Addressed This Visit             ICD-10-CM    Cerebral palsy (CMS/HCC) G80.9    Protein-calorie malnutrition, unspecified severity (CMS/HCC) E46     Other Visit Diagnoses         Codes    Thalamic stroke (CMS/HCC)    -  Primary I63.81               "

## 2024-02-15 ENCOUNTER — HOME CARE VISIT (OUTPATIENT)
Dept: HOME HEALTH SERVICES | Facility: HOME HEALTH | Age: 74
End: 2024-02-15
Payer: MEDICARE

## 2024-02-15 PROCEDURE — G0157 HHC PT ASSISTANT EA 15: HCPCS

## 2024-02-15 ASSESSMENT — ENCOUNTER SYMPTOMS
SUBJECTIVE PAIN PROGRESSION: WAXING AND WANING
PAIN SEVERITY GOAL: 2/10
HIGHEST PAIN SEVERITY IN PAST 24 HOURS: 5/10
PERSON REPORTING PAIN: PATIENT
LOWEST PAIN SEVERITY IN PAST 24 HOURS: 3/10
PAIN LOCATION: RIGHT LEG
PAIN: 1
PAIN LOCATION: RIGHT ARM

## 2024-02-16 ENCOUNTER — TELEMEDICINE (OUTPATIENT)
Dept: PHARMACY | Facility: HOSPITAL | Age: 74
End: 2024-02-16
Payer: MEDICARE

## 2024-02-16 DIAGNOSIS — I63.9 CEREBROVASCULAR ACCIDENT (CVA), UNSPECIFIED MECHANISM (MULTI): ICD-10-CM

## 2024-02-16 NOTE — PROGRESS NOTES
Pharmacy Post-Discharge Visit  Josh Kessler is a 73 y.o. male was referred to Clinical Pharmacy Team to complete a post-discharge medication optimization and monitoring visit.  The patient was referred for their Cerebrovascular Accident.    Admission Date: 2024  Discharge Date: 2024    Referring Provider: Dewey Cruz DO    Subjective   No Known Allergies    [id number] - [facility], [place] - [address] AT [facility]  [address]  [facility] [address]  Phone: [phone] Fax: [phone]      Social History     Social History Narrative    Not on file      Notable Medication changes following discharge:  Start:   - Aspirin 81 mg take one tablet by mouth once daily  - Atorvastatin 40 mg take one tablet daily by mouth at bedtime  - Clopidogrel 75 mg take one tablet by mouth daily x 20 days -finished Wednesday (2024)   Stop: N/A  Change: N/A    Patient is a 73 y.o. male who presents for an initial clinical pharmacy post discharge follow up visit. He was recently hospitalized for a CVA. He was able to  his discharge medications and start taking them without any issues. He has a follow up with neurology scheduled for 2024. He has already been seen by his PCP post-discharge and overall is doing better. Updated medication and allergy list appropriately.       STROKE MANAGEMENT ASSESSMENT    Stroke Regimen:  -Cause of stroke: Non-Cardioembolic  -Stroke Meds:   -Anticoagulant: No   -Antiplatelet: Yes, describe: Aspirin 81 mg once daily    HTN Assessment  -HTN diagnosis: no  -Last BP: 140/82 mmHg (2024)     HLD Assessment  -Current LDL: 102 mg/dL  -Current T mg/dL  -Current Regimen   -Atorvastatin 40 mg once daily   -HLD at goal? yes; 56.5 mg/dL    Diabetes Mellitus Assessment:  -Diagnosis? no    Medication System Management:  Affordability/Accessibility: N/A    Objective     There were no vitals taken for this  visit.     LAB  Lab Results   Component Value Date    BILITOT 1.0 01/22/2024    CALCIUM 9.8 01/22/2024    CO2 29 01/22/2024     01/22/2024    CREATININE 1.06 01/22/2024    GLUCOSE 134 (H) 01/22/2024    ALKPHOS 105 01/22/2024    K 3.8 01/22/2024    PROT 7.4 01/22/2024     01/22/2024    AST 22 01/22/2024    ALT 26 01/22/2024    BUN 21 01/22/2024    ANIONGAP 11 01/22/2024    ALBUMIN 4.5 01/22/2024    GFRMALE 72 06/24/2022     Lab Results   Component Value Date    TRIG 65 01/22/2024    CHOL 171 01/22/2024    LDLCALC 102 (H) 01/22/2024    HDL 56.5 01/22/2024     Lab Results   Component Value Date    HGBA1C 5.0 01/22/2024         Current Outpatient Medications on File Prior to Visit   Medication Sig Dispense Refill    acetaminophen (Tylenol) 325 mg tablet Take by mouth every 6 hours if needed.      aspirin 81 mg chewable tablet Chew 1 tablet (81 mg) once daily. Do not start before January 24, 2024. 30 tablet 1    atorvastatin (Lipitor) 40 mg tablet Take 1 tablet (40 mg) by mouth once daily at bedtime. 30 tablet 0    [DISCONTINUED] clopidogrel (Plavix) 75 mg tablet Take 1 tablet (75 mg) by mouth once daily for 20 days. Do not start before January 24, 2024. 20 tablet 0    [DISCONTINUED] enoxaparin (Lovenox) 40 mg/0.4 mL syringe Inject under the skin.      [DISCONTINUED] ondansetron (Zofran) 4 mg/2 mL injection Infuse into a venous catheter every 6 hours if needed.      [DISCONTINUED] perflutren protein A microsphere (Optison) 0.22 mg/mL suspension injection Infuse into a venous catheter.      [DISCONTINUED] SULFUR HEXAFLUORIDE MICROSPHR IV Infuse into a venous catheter.       No current facility-administered medications on file prior to visit.     Assessment/Plan   Cerebrovascular accident (CVA), unspecified mechanism (CMS/HCC)  CONTINUE Aspirin 81 mg once daily, and atorvastatin 40 mg once daily at bedtime  CONTINUE to follow up with PCP and neurologist as directed     Follow up with Clinical Pharmacy Team as  needed by patient and PCP     Continue all meds under the continuation of care with the referring provider and clinical pharmacy team.    Please reach out to the Clinical Pharmacy Team if there are any further questions.     Verbal consent to manage patient's drug therapy was obtained from patient. They were informed they may decline to participate or withdraw from participation in pharmacy services at any time.    Elicia Alberto, PharmD  PGY-1 Pharmacy Resident  [phone]

## 2024-02-19 ENCOUNTER — HOME CARE VISIT (OUTPATIENT)
Dept: HOME HEALTH SERVICES | Facility: HOME HEALTH | Age: 74
End: 2024-02-19
Payer: MEDICARE

## 2024-02-19 PROCEDURE — G0151 HHCP-SERV OF PT,EA 15 MIN: HCPCS

## 2024-02-19 ASSESSMENT — ENCOUNTER SYMPTOMS
PERSON REPORTING PAIN: PATIENT
OCCASIONAL FEELINGS OF UNSTEADINESS: 1
PAIN LOCATION - PAIN SEVERITY: 1/10
PAIN LOCATION: RIGHT LEG
PAIN LOCATION - PAIN SEVERITY: 1/10
PAIN LOCATION: RIGHT ARM
PAIN: 1

## 2024-02-20 DIAGNOSIS — G45.9 TRANSIENT CEREBRAL ISCHEMIA, UNSPECIFIED TYPE: ICD-10-CM

## 2024-02-20 RX ORDER — NAPROXEN SODIUM 220 MG/1
81 TABLET, FILM COATED ORAL DAILY
Qty: 30 TABLET | Refills: 1 | Status: SHIPPED | OUTPATIENT
Start: 2024-02-20 | End: 2024-04-20

## 2024-02-20 RX ORDER — ATORVASTATIN CALCIUM 40 MG/1
40 TABLET, FILM COATED ORAL NIGHTLY
Qty: 30 TABLET | Refills: 0 | Status: SHIPPED | OUTPATIENT
Start: 2024-02-20 | End: 2024-03-14 | Stop reason: SDUPTHER

## 2024-02-21 ENCOUNTER — HOME CARE VISIT (OUTPATIENT)
Dept: HOME HEALTH SERVICES | Facility: HOME HEALTH | Age: 74
End: 2024-02-21
Payer: MEDICARE

## 2024-02-21 PROCEDURE — G0157 HHC PT ASSISTANT EA 15: HCPCS

## 2024-02-21 SDOH — HEALTH STABILITY: PHYSICAL HEALTH
EXERCISE COMMENTS: PATIENT COMPLETED SELECT SUPINE THERAPEUTIC EXERCISES TO INCREASE STRENGTH AND ENDURANCE:   - BRIDGES  - HIP ABDUCTION IN HOOKLYING   - SLR    PATIENT WAS ABLE TO COMPLETE 10 REPETITIONS OF BRIDGES, REQUIRING CUEING ON CORRECT FORM AND TO KEEP THE LE

## 2024-02-21 SDOH — HEALTH STABILITY: PHYSICAL HEALTH: EXERCISE TYPE: SUPINE, SEATED

## 2024-02-21 SDOH — HEALTH STABILITY: PHYSICAL HEALTH
EXERCISE COMMENTS: NCREASE STRENGTH AND ENDURANCE. PATIENT UTILIZES A PILLOW BETWEEN THE KNEES TO KEEP THE HIPS NEUTRAL. PATIENT COMPENSATES WITH HIS QUADS TO OVERCOME GLUTE WEAKNESS BY BRINGING HIS KNEES IN FRONT OF HIS TOES AND EXTENDING THE KNEES.

## 2024-02-21 SDOH — HEALTH STABILITY: PHYSICAL HEALTH
EXERCISE COMMENTS: LY ABDUCTING HIS HIPS, PATIENT DEMONSTRATED GOOD CONTROL ECCENTRICALLY TO RETURN TO THE STARTING POSITIONS. PATIENT RAN INTO THE SAME ISSUE WITH THE SUPINATED FEET DURING ACTIVE HIP ABDUCTION, TACTILE CUES WERE GIVEN ON THE LATERAL PART OF THE ANKLE

## 2024-02-21 SDOH — HEALTH STABILITY: PHYSICAL HEALTH
EXERCISE COMMENTS: FOR PATIENT TO ACTIVATE THE FOOT EVERTORS TO BRING THE FOOT BACK TO NEUTRAL. PATIENT ATTEMPTED SLR, BUT DOESN'T HAVE THE REQUIRED QUAD STRENGTH TO KEEP THE KNEE IN FULL EXTENSION.   PATIENT ALSO COMPLETED 10 REPETITIONS OF SIT TO STAND TRANSFERS TO I

## 2024-02-21 SDOH — HEALTH STABILITY: PHYSICAL HEALTH
EXERCISE COMMENTS: S FROM ADDUCTING. PATIENT COULD HOLD HIS KNEES APART WITH NO ASSISTANCE, BUT BOTH OF HIS FEET WOULD GO INTO A SUPINATED POSITION. PATIENT COMPLETED THE HIP ABDUCTION IN HOOKLYING EXERCISE WITH HIS THERABAND AROUND HIS LES ABOVE THE KNEES. WHEN ACTIVE

## 2024-02-21 ASSESSMENT — ACTIVITIES OF DAILY LIVING (ADL)
AMBULATION ASSISTANCE ON FLAT SURFACES: 1
AMBULATION_DISTANCE/DURATION_TOLERATED: 75FT

## 2024-02-21 ASSESSMENT — ENCOUNTER SYMPTOMS
PAIN: 1
PAIN LOCATION: RIGHT ARM
PAIN SEVERITY GOAL: 0/10
PERSON REPORTING PAIN: PATIENT
SUBJECTIVE PAIN PROGRESSION: UNCHANGED
LOWEST PAIN SEVERITY IN PAST 24 HOURS: 3/10
PAIN LOCATION: RIGHT LEG
HIGHEST PAIN SEVERITY IN PAST 24 HOURS: 5/10

## 2024-02-25 LAB — BODY SURFACE AREA: 1.61 M2

## 2024-02-27 NOTE — PROGRESS NOTES
I reviewed the progress note and agree with the resident’s findings and plans as written. Case discussed with resident.    Jalen Easley, PharmD

## 2024-03-04 ENCOUNTER — HOME CARE VISIT (OUTPATIENT)
Dept: HOME HEALTH SERVICES | Facility: HOME HEALTH | Age: 74
End: 2024-03-04
Payer: MEDICARE

## 2024-03-04 PROCEDURE — 0023 HH SOC

## 2024-03-04 PROCEDURE — G0157 HHC PT ASSISTANT EA 15: HCPCS

## 2024-03-04 ASSESSMENT — ENCOUNTER SYMPTOMS: DENIES PAIN: 1

## 2024-03-06 ENCOUNTER — TELEPHONE (OUTPATIENT)
Dept: PRIMARY CARE | Facility: CLINIC | Age: 74
End: 2024-03-06
Payer: MEDICARE

## 2024-03-07 ENCOUNTER — HOME CARE VISIT (OUTPATIENT)
Dept: HOME HEALTH SERVICES | Facility: HOME HEALTH | Age: 74
End: 2024-03-07
Payer: MEDICARE

## 2024-03-07 DIAGNOSIS — G80.9 CEREBRAL PALSY, UNSPECIFIED TYPE (MULTI): Primary | ICD-10-CM

## 2024-03-07 PROCEDURE — G0157 HHC PT ASSISTANT EA 15: HCPCS

## 2024-03-07 ASSESSMENT — ENCOUNTER SYMPTOMS: DENIES PAIN: 1

## 2024-03-11 ENCOUNTER — HOME CARE VISIT (OUTPATIENT)
Dept: HOME HEALTH SERVICES | Facility: HOME HEALTH | Age: 74
End: 2024-03-11
Payer: MEDICARE

## 2024-03-11 PROCEDURE — G0157 HHC PT ASSISTANT EA 15: HCPCS

## 2024-03-11 ASSESSMENT — ENCOUNTER SYMPTOMS: DENIES PAIN: 1

## 2024-03-12 ENCOUNTER — PATIENT OUTREACH (OUTPATIENT)
Dept: CARE COORDINATION | Facility: CLINIC | Age: 74
End: 2024-03-12
Payer: MEDICARE

## 2024-03-12 NOTE — PROGRESS NOTES
Call placed regarding one month post discharge follow up call.  At time of outreach call the patient feels as if their condition has improved since initial visit with PCP or specialist.  Questions or concerns regarding recovery period addressed at this time. Having some shoulder pain may be related to walker use, will try exercises provided by PT OT and let us know if any worsening.  Reviewed any PCP or specialists progress notes/labs/radiology reports if applicable and addressed any questions or concerns.

## 2024-03-14 ENCOUNTER — HOME CARE VISIT (OUTPATIENT)
Dept: HOME HEALTH SERVICES | Facility: HOME HEALTH | Age: 74
End: 2024-03-14
Payer: MEDICARE

## 2024-03-14 DIAGNOSIS — G45.9 TRANSIENT CEREBRAL ISCHEMIA, UNSPECIFIED TYPE: ICD-10-CM

## 2024-03-14 PROCEDURE — G0157 HHC PT ASSISTANT EA 15: HCPCS

## 2024-03-14 RX ORDER — ATORVASTATIN CALCIUM 40 MG/1
40 TABLET, FILM COATED ORAL NIGHTLY
Qty: 90 TABLET | Refills: 3 | Status: SHIPPED | OUTPATIENT
Start: 2024-03-14

## 2024-03-14 ASSESSMENT — ENCOUNTER SYMPTOMS
SUBJECTIVE PAIN PROGRESSION: WAXING AND WANING
PAIN SEVERITY GOAL: 0/10
PERSON REPORTING PAIN: PATIENT
PAIN LOCATION: LEFT SHOULDER
LOWEST PAIN SEVERITY IN PAST 24 HOURS: 3/10
HIGHEST PAIN SEVERITY IN PAST 24 HOURS: 6/10
PAIN: 1

## 2024-03-18 ENCOUNTER — HOME CARE VISIT (OUTPATIENT)
Dept: HOME HEALTH SERVICES | Facility: HOME HEALTH | Age: 74
End: 2024-03-18
Payer: MEDICARE

## 2024-03-18 PROCEDURE — G0151 HHCP-SERV OF PT,EA 15 MIN: HCPCS

## 2024-03-18 ASSESSMENT — ENCOUNTER SYMPTOMS
PERSON REPORTING PAIN: PATIENT
PAIN: 1
OCCASIONAL FEELINGS OF UNSTEADINESS: 1
PAIN LOCATION - PAIN QUALITY: ACHE
PAIN LOCATION: LEFT LEG

## 2024-03-18 ASSESSMENT — ACTIVITIES OF DAILY LIVING (ADL)
HOME_HEALTH_OASIS: 00
OASIS_M1830: 01

## 2024-03-26 ENCOUNTER — EVALUATION (OUTPATIENT)
Dept: PHYSICAL THERAPY | Facility: CLINIC | Age: 74
End: 2024-03-26
Payer: MEDICARE

## 2024-03-26 VITALS — OXYGEN SATURATION: 99 % | SYSTOLIC BLOOD PRESSURE: 161 MMHG | DIASTOLIC BLOOD PRESSURE: 89 MMHG | HEART RATE: 76 BPM

## 2024-03-26 DIAGNOSIS — G80.9 CEREBRAL PALSY, UNSPECIFIED TYPE (MULTI): ICD-10-CM

## 2024-03-26 PROCEDURE — 97162 PT EVAL MOD COMPLEX 30 MIN: CPT | Mod: GP

## 2024-03-26 PROCEDURE — 97530 THERAPEUTIC ACTIVITIES: CPT | Mod: GP

## 2024-03-26 ASSESSMENT — COLUMBIA-SUICIDE SEVERITY RATING SCALE - C-SSRS
1. IN THE PAST MONTH, HAVE YOU WISHED YOU WERE DEAD OR WISHED YOU COULD GO TO SLEEP AND NOT WAKE UP?: NO
6. HAVE YOU EVER DONE ANYTHING, STARTED TO DO ANYTHING, OR PREPARED TO DO ANYTHING TO END YOUR LIFE?: NO
2. HAVE YOU ACTUALLY HAD ANY THOUGHTS OF KILLING YOURSELF?: NO

## 2024-03-26 ASSESSMENT — BALANCE ASSESSMENTS
STANDING TO SITTING: ABLE TO STAND INDEPENDENTLY USING HANDS
STANDING UNSUPPORTED WITH EYES CLOSED: ABLE TO STAND 10 SECONDS SAFELY
STANDING ON ONE LEG: ABLE TO LIFT LEG INDEPENDENTLY AND HOLD 5-10 SECONDS
STANDING UNSUPPORTED ONE FOOT IN FRONT: ABLE TO PLACE FOOT AHEAD INDEPENDENTLY AND HOLD 30 SECONDS
LONG VERSION TOTAL SCORE (MAX 56): 44
PICK UP OBJECT FROM THE FLOOR FROM A STANDING POSITION: ABLE TO PICK UP SLIPPER BUT NEEDS SUPERVISION
STANDING UNSUPPORTED WITH FEET TOGETHER: ABLE TO PLACE FEET TOGETHER INDEPENDENTLY AND STAND 1 MINUTE SAFELY
STANDING UNSUPPORTED: ABLE TO STAND SAFELY FOR 2 MINUTES
STANDING TO SITTING: CONTROLS DESCENT BY USING HANDS
PLACE ALTERNATE FOOT ON STEP OR STOOL WHILE STANDING UNSUPPORTED: TURNS SIDEWAYS ONLY BUT MAINTAINS BALANCE
TRANSFERS: ABLE TO TRANSFER SAFELY DEFINITE NEED OF HANDS
TURN 360 DEGREES: ABLE TO TURN 360 DEGREES SAFELY ONE SIDE ONLY 4 SECONDS OR LESS
REACHING FORWARD WITH OUTSTRETCHED ARM WHILE STANDING: CAN REACH FORWARD 5 CM (2 INCHES)
PLACE ALTERNATE FOOT ON STEP OR STOOL WHILE STANDING UNSUPPORTED: ABLE TO STAND INDEPENDENTLY AND COMPLETE 8 STEPS IN GREATER THAN 20 SECONDS
SITTING WITH BACK UNSUPPORTED BUT FEET SUPPORTED ON FLOOR OR ON A STOOL: ABLE TO SIT SAFELY AND SECURELY FOR 2 MINUTES

## 2024-03-26 ASSESSMENT — PAIN SCALES - GENERAL: PAINLEVEL_OUTOF10: 3

## 2024-03-26 ASSESSMENT — PATIENT HEALTH QUESTIONNAIRE - PHQ9
2. FEELING DOWN, DEPRESSED OR HOPELESS: NOT AT ALL
1. LITTLE INTEREST OR PLEASURE IN DOING THINGS: NOT AT ALL
SUM OF ALL RESPONSES TO PHQ9 QUESTIONS 1 AND 2: 0

## 2024-03-26 ASSESSMENT — ENCOUNTER SYMPTOMS
OCCASIONAL FEELINGS OF UNSTEADINESS: 0
LOSS OF SENSATION IN FEET: 0
DEPRESSION: 0

## 2024-03-26 ASSESSMENT — PAIN DESCRIPTION - DESCRIPTORS: DESCRIPTORS: PATIENT UNABLE TO DESCRIBE

## 2024-03-26 ASSESSMENT — PAIN - FUNCTIONAL ASSESSMENT: PAIN_FUNCTIONAL_ASSESSMENT: 0-10

## 2024-03-26 ASSESSMENT — ACTIVITIES OF DAILY LIVING (ADL): ADL_ASSISTANCE: INDEPENDENT

## 2024-03-26 NOTE — PROGRESS NOTES
Physical Therapy    Physical Therapy Evaluation     Patient Name: Josh Kessler  MRN: [id number]  Today's Date: 3/26/2024  Time Calculation  Start Time: 0915  Stop Time: 1005  Time Calculation (min): 50 min  Billing:  Evaluation: (Medium): 30  Therapeutic Activity:  20    Insurance  Visit #: 1  Insurance Reviewed (per information provided by  pre-cert team)  Authorization required:  No, Med nec, $20 co-pay, [employer] Medicare    Assessment:  PT Assessment  PT Assessment Results: Decreased strength, Decreased range of motion, Impaired balance, Decreased mobility, Pain, Decreased coordination, Impaired tone  Rehab Prognosis: Fair  Evaluation/Treatment Tolerance: Patient tolerated treatment well   73yr M pt presents to physical therapy s/p thalamic stroke (1/22/24) and who also has Cerebral Palsy. During examination patient has the following deficits: LE strength, coordination, gait/stair dysfunction, balance, decreased functional mobility, and tolerance with activity. Discussed with patient about trialing land therapy first and if no benefit then potential transfer to aquatic PT. pt would benefit from skilled physical therapy to maximize pt safety, increase tolerance to activity and to address impairments to restore PLOF with ADLs, functional mobility and participation in activities.      Plan:  OP PT Plan  Treatment/Interventions: Aquatic therapy, Cryotherapy, Education/ Instruction, Gait training, Manual therapy, Neuromuscular re-education, Orthosis fit/ training, Self care/ home management, Therapeutic activities, Therapeutic exercises  PT Plan: Skilled PT  PT Frequency:  (1-2x/week)  Duration: 6 weeks  Onset Date: 01/22/24  Rehab Potential: Fair  Plan of Care Agreement: Patient  NV: walking mechanics, gait, spasticity (PROM)     Current Problem:   1. Cerebral palsy, unspecified type (CMS/HCC)  Referral to Physical Therapy    Follow Up In Physical Therapy          Subjective    General:  General  Reason  for Referral: Cerebral Palsy/Thalamic Stroke  Referred By: DO Hussein  Past Medical History Relevant to Rehab: CP, Spondylosis, Crushed Vertebra, Scoliosis  Preferred Learning Style: verbal, visual, written  General Comment: Recent L Thalamic Stroke (1/22/24) - had home care visits that stopped 3/18/24. Home health was helping him with his strength back and trying to help him with his stability and balance. recently he thinks his shoulders have started to become painful and he attests it to using his walker for so long. States he uses 2 walking sticks when he knows he wont have to carry anything. Has noticed with his R hand he has decreased dexterity. Reports he has a numbness ont he outside of his R thumb only.  Precautions:  Precautions  STEADI Fall Risk Score (The score of 4 or more indicates an increased risk of falling): 7  Hearing/Visual Limitations: Hearing Aides  Medical Precautions: Fall precautions  Vital Signs:  Vital Signs  Heart Rate: 76  Heart Rate Source: Monitor  SpO2: 99 %  BP: 161/89  BP Location: Left arm  BP Method: Automatic  Patient Position: Sitting  Pain:  Pain Assessment  Pain Assessment: 0-10  Pain Score: 3  Pain Type: Chronic pain  Pain Location: Shoulder  Pain Orientation: Right, Left  Pain Descriptors: Patient unable to describe  Pain Frequency: Intermittent  Home Living:  Home Living  Home Living Comment: 1 story ranch home; 4 steps to enter the house with HR; Walk-in shower with 3 grab bars in the bathroom. Shower chair. No steps needed once in the home. Washer/Dryer setup in the first floor.  Prior Level of Function:  Prior Function Per Pt/Caregiver Report  Level of [place]: Independent with homemaking with ambulation, Independent with ADLs and functional transfers  Receives Help From: Friends  ADL Assistance: Independent  Homemaking Assistance: Independent  Ambulatory Assistance: Independent  Vocational: Retired  Leisure: Workout in his yard; rebuilding a flower bed, putting  in a brick bed, and taking out a flower bed  Hand Dominance: Left  Prior Function Comments: Mobility is still lacking from prior to stroke.    Objective     LE Dermatomes: WFL     Coordination:   Finger to nose: minimal deficits BL   Opposition: R side is slower than L   OSIRIS UE: WFL  Heel to Shin: R sided deficits, unable to have heel go up all the way on the shin   Alt Toe Taps: Decreased R LE toe taps vs. L LE     Functional Assessments:  Bed Mobility Comment: IND with sup/sit and sit/sup - close supervision  Transfers Comment: Mod I - use of UE assist    Extremity/Trunk Assessments:  RLE   RLE : Exceptions to WFL  Strength RLE  R Hip Flexion: 4+/5  R Hip Extension: 4-/5  R Hip ABduction: 3+/5  R Knee Flexion: 4+/5  R Knee Extension: 4+/5  R Ankle Dorsiflexion: 4-/5  R Ankle Plantar Flexion: 5/5  Tone RLE  RLE Tone: Hypertonic  Tone Assessment: Spasticity with hip flexion; hip ER; Abd and Add    LLE   LLE : Exceptions to WFL  AROM LLE (degrees)  LLE AROM Comment: WFL  PROM LLE (degrees)  LLE PROM Comment: Spasticity with hip flexion; hip ER; Abd and Add  Strength LLE  L Hip Flexion: 4+/5  L Hip Extension: 4-/5  L Hip ABduction: 4-/5  L Knee Flexion: 5/5  L Knee Extension: 5/5  L Ankle Dorsiflexion: 5/5  L Ankle Plantar Flexion: 5/5  Tone LLE  LLE Tone: Hypertonic    Gait: Ambulates with a rollator; flexed trunk posture, BL foot drag and little to no active DF with gait cycle. Decreased hip flexion and hip extension for swing phase. Small step length, decreased noel.     Stairs: Ascends 4 6in steps in a reciprocal pattern with decreased ability for proper foot clearance; BL use of HR; use of gait belt and CGA to Min A at times to ensure safety; descends 4 6in steps in a step to step pattern with BL use of HR and Min A from therapist for safety; decreased ability to plant foot properly on step.     Outcome Measures:  Whiting Balance Scale  1. Sitting to Standing: Able to stand independently using hands  2. Standing  Unsupported: Able to stand safely for 2 minutes  3. Sitting with Back Unsupported but Feet Supported on Floor or on a Stool: Able to sit safely and securely for 2 minutes  4. Standing to Sitting: Controls descent by using hands  5.  Transfers: Able to transfer safely definite need of hands  6. Standing Unsupported with Eyes Closed: Able to stand 10 seconds safely  7. Standing Unsupported with Feet Together: Able to place feet together independently and stand 1 minute safely  8. Reach Forward with Outstretched Arm While Standing: Can reach forward 5 cm (2 inches)  9.  Object from Floor from a Standing Position: Able to  slipper but needs supervision  10. Turning to Look Behind Over Left and Right Shoulders While Standing: Turns sideways only but maintains balance  11. Turn 360 Degrees: Able to turn 360 degrees safely one side only 4 seconds or less  12. Place Alternate Foot on Step or Stool While Standing Unsupported: Able to stand independently and complete 8 steps in greater than 20 seconds  13. Standing Unsupported One Foot in Front: Able to place foot ahead independently and hold 30 seconds  14. Standing on One Leg: Able to lift leg independently and hold 5-10 seconds  Whiting Balance Score: 44    Timed Up and Go Test  How many seconds did it take to complete the 5 tasks?: 19.49 seconds  Observe the patient’s postural stability, gait, stride length, and sway. Select All that Apply:: Slow tentative pace, Shuffling (Toe drag)    Other Measures  5x Sit to Stand: 27.48s with R arm UE assist; standard chair height; walker in front     Treatments:  Therapeutic Activity  Therapeutic Activity Performed: Yes  Therapeutic Activity 1: Mod Complex Eval Assessment  1. Repeated sit to/from stands from standardized chair height with use of 1 UE assist. Required VC for no use of hands, nose over toes, full upright stand from chair, use of anterior shift with fwd momentum and eccentric control into chair.  2.  Functional Performance via gait analysis of TUG: Verbal cues for sequencing/positioning. 2x10' at CGA with use of gait belt.   3. Functional mobility via AROM/PROM of LE; Verbal cues for sequencing/positioning.  4. Functional Performance via MMT of LE: Hip, knee, ankle; Verbal cues for sequencing/positioning.  5. Educated pt on POC, goals of physical therapy, purpose of physical therapy, as well as the benefits in participating in physical therapy to increase functional mobility and maximize pt safety.   6. Functional performance via INGRAM: Gait belt; Mod VC for proper sequencing/positioning       EDUCATION:  Outpatient Education  Individual(s) Educated: Patient  Education Provided: Anatomy, Body Mechanics, Fall Risk, Home Safety, Physiology, POC, Posture  Risk and Benefits Discussed with Patient/Caregiver/Other: yes  Patient/Caregiver Demonstrated Understanding: yes  Plan of Care Discussed and Agreed Upon: yes  Patient Response to Education: Patient/Caregiver Performed Return Demonstration of Exercises/Activities, Patient/Caregiver Verbalized Understanding of Information, Patient/Caregiver Asked Appropriate Questions    Goals:  STG: pt will be independent in HEP & symptom management    LTGs:  Pain: pt will demonstrate a 2 pt improvement for pain on the VAS scale, allowing for improved tolerance to perform daily functional activities.     Strength: Pt will improve B LE Strength to >/= 4+/5 to increase functional performance, tolerance to activity, and enhancing pt safety to particpate in ADLs and IADLs.    Gait: pt will demonstrate normal mechanics without pain during gait and performance of stairs, allowing for pt to return to navigating the community.     TUG: pt will complete TUG within 12 seconds or less (indicative of higher fall risk) in order to INC balance and enhance safety during ADLs/ IADLs. (> or equal to 12 seconds indicates high risk for falls in older adults. 11-20 seconds is normal for the frail and  elderly.) OR MCID 3.4s Baseline 19.49 sec with use of Rollator    5xSTS: pt will perform 5x sit to  < 15 seconds to decrease fall risk. OR MCID 2.3s Baseline 27.48  sec    INGRAM: Pt will score >/= 45 on the INGRAM to indicate improved functional mobility, static/dynamic balance, and to decrease fall risk.  Base Score = 44     Stairs: pt will ascend/descend step over step (6in step) with proper gait mechanics and use of <1HR to promote functional mobility and improve functional performance.

## 2024-04-05 ENCOUNTER — TREATMENT (OUTPATIENT)
Dept: PHYSICAL THERAPY | Facility: CLINIC | Age: 74
End: 2024-04-05
Payer: MEDICARE

## 2024-04-05 DIAGNOSIS — G80.9 CEREBRAL PALSY, UNSPECIFIED TYPE (MULTI): ICD-10-CM

## 2024-04-05 PROCEDURE — 97110 THERAPEUTIC EXERCISES: CPT | Mod: GP

## 2024-04-05 PROCEDURE — 97140 MANUAL THERAPY 1/> REGIONS: CPT | Mod: GP

## 2024-04-05 ASSESSMENT — PAIN SCALES - GENERAL: PAINLEVEL_OUTOF10: 3

## 2024-04-05 ASSESSMENT — PAIN DESCRIPTION - DESCRIPTORS: DESCRIPTORS: OTHER (COMMENT)

## 2024-04-05 ASSESSMENT — PAIN - FUNCTIONAL ASSESSMENT: PAIN_FUNCTIONAL_ASSESSMENT: 0-10

## 2024-04-05 NOTE — PROGRESS NOTES
Physical Therapy Treatment    Patient Name: Josh Kessler  MRN: [id number]  Today's Date: 4/5/2024  Time Calculation  Start Time: 0800  Stop Time: 0844  Time Calculation (min): 44 min  Billing:  Treatment:   Therapeutic Exercise:  29  Manual:  15    Insurance  Visit #: 2  Insurance Reviewed (per information provided by  pre-cert team)  Authorization required:  No, Med nec, $20 co-pay, [employer] Medicare    Assessment:  PT Assessment  PT Assessment Results: Decreased strength, Decreased range of motion, Impaired balance, Decreased mobility, Pain, Decreased coordination, Impaired tone  Rehab Prognosis: Fair  Evaluation/Treatment Tolerance: Patient tolerated treatment well  pt tolerated treatment well, did well with first treatment session since initial evaluation. pt needs continued skilled PT to work on decreasing spasticity in the BL LE and increasing LE strength to allow return to optimal level of functional mobility and tolerance with activities. Patient is motivated to participate in PT. pt left session with all questions answered.     Plan:  OP PT Plan  Treatment/Interventions: Aquatic therapy, Cryotherapy, Education/ Instruction, Gait training, Manual therapy, Neuromuscular re-education, Orthosis fit/ training, Self care/ home management, Therapeutic activities, Therapeutic exercises  PT Plan: Skilled PT  Onset Date: 01/22/24  NV: Standing // bar exercises; PROM to the LE     Current Problem  Problem List Items Addressed This Visit             ICD-10-CM    Cerebral palsy (CMS/HCC) G80.9       General  PT  Visit  PT Received On: 04/05/24  General  Reason for Referral: Cerebral Palsy/Thalamic Stroke  Referred By: DO Hussein  Past Medical History Relevant to Rehab: CP, Spondylosis, Crushed Vertebra, Scoliosis  Preferred Learning Style: verbal, visual, written  General Comment: No falls to report; nothing new to report other than that he has been really busy so he has been tired at night.    Subjective     Precautions  Precautions  STEADI Fall Risk Score (The score of 4 or more indicates an increased risk of falling): 7  Hearing/Visual Limitations: Hearing Aides  Medical Precautions: Fall precautions  Pain  Pain Assessment  Pain Assessment: 0-10  Pain Score: 3  Pain Type: Acute pain  Pain Location: Knee  Pain Orientation: Right  Pain Descriptors: Other (Comment) (Stiff)  Pain Frequency: Constant/continuous    Objective   Posture  Postural Control  Posture Comment: Slight fwd head, rounded shoulders    Gait: The patient is ambulating with the following device: walker. Gait deviations include: Lacks heel strike, Decreased velocity, Decreased stride length, Shuffling, Forward flexed, and BL Knee flexed; valgus; .  Sit to Stand Transfers: independent  Bed Mobility: independent     Treatments:  Therapeutic Exercise  Therapeutic Exercise Performed: Yes  Therapeutic Exercise Activity 1: Nustep for 8min - to increase functional mobility and tolerance with activity  Therapeutic Exercise Activity 2: Bridges with Ball btw leg to ensure neutral alignment 3x10  Therapeutic Exercise Activity 3: Mini SLR + Quad Set 2x10 BL  Therapeutic Exercise Activity 4: LAQ with ball Squeeze in Sitting position: x15 BL  Therapeutic Exercise Activity 5: STS from mat table 21in height; CGA; VC for proper sequencing/positioning    Manual Therapy  Manual Therapy Performed: Yes  Manual Therapy Activity 1: PROM of LE: Hip flexion, Knee flexion, knee extension, hip add/abd BL    OP EDUCATION:  Outpatient Education  Individual(s) Educated: Patient  Education Provided: Anatomy, Body Mechanics, Home Exercise Program, POC, Posture  Patient/Caregiver Demonstrated Understanding: yes  Patient Response to Education: Patient/Caregiver Performed Return Demonstration of Exercises/Activities, Patient/Caregiver Verbalized Understanding of Information, Patient/Caregiver Asked Appropriate Questions  Access Code: [email or web address]  URL:  [email or web address]  Date: 04/05/2024  Prepared by: Hilda Mistry    Exercises  - Supine Bridge with Mini Swiss Ball Between Knees  - 1 x daily - 7 x weekly - 3 sets - 10 reps  - Small Range Straight Leg Raise  - 1 x daily - 7 x weekly - 3 sets - 10 reps  - Seated Long Arc Quad  - 1 x daily - 7 x weekly - 3 sets - 10 reps  - Sit to Stand with Counter Support  - 1 x daily - 7 x weekly - 3 sets - 10 reps

## 2024-04-10 ENCOUNTER — TREATMENT (OUTPATIENT)
Dept: PHYSICAL THERAPY | Facility: CLINIC | Age: 74
End: 2024-04-10
Payer: MEDICARE

## 2024-04-10 DIAGNOSIS — G80.9 CEREBRAL PALSY, UNSPECIFIED TYPE (MULTI): ICD-10-CM

## 2024-04-10 PROCEDURE — 97110 THERAPEUTIC EXERCISES: CPT | Mod: GP,CQ

## 2024-04-10 PROCEDURE — 97140 MANUAL THERAPY 1/> REGIONS: CPT | Mod: GP,CQ

## 2024-04-10 PROCEDURE — 97112 NEUROMUSCULAR REEDUCATION: CPT | Mod: GP,CQ

## 2024-04-10 NOTE — PROGRESS NOTES
Physical Therapy Treatment    Patient Name: Josh Kessler  MRN: [id number]  Today's Date: 4/10/2024  Time Calculation  Start Time: 1050  Stop Time: 1130  Time Calculation (min): 40 min     Billing:  Treatment:   Therapeutic Exercise:  14  Manual:  18  NMR: 8    Insurance  Visit #: 3  Insurance Reviewed (per information provided by  pre-cert team)  Authorization required:  No, Med nec, $20 co-pay, [employer] Medicare    Assessment:   Patient tolerated treatment well, did well with blaze pods and dynamic reach with sit to stand progression, with improved RLE heel strike motor planning with repetition of blaze pods and improved RLE WB with sit to stand with repetition . Patient needs continued skilled PT to work on decreasing spasticity in the BL LE and increasing LE strength to allow return to optimal level of functional mobility and tolerance with activities. Patient is motivated to participate in PT. pt left session with all questions answered.     Plan:   OP PT Plan  Treatment/Interventions: Aquatic therapy, Cryotherapy, Education/ Instruction, Gait training, Manual therapy, Neuromuscular re-education, Orthosis fit/ training, Self care/ home management, Therapeutic activities, Therapeutic exercises  NV: Continue to progress standing BLE exercises in // bar, progress blaze pods heel strike reciprocally with increased colors and continue with BLE PROM.     Current Problem  Problem List Items Addressed This Visit             ICD-10-CM       Neuro    Cerebral palsy (CMS/HCC) G80.9       General   Reason for Referral: Cerebral Palsy/Thalamic Stroke  Referred By: DO Hussein   Preferred Learning Style: verbal, visual, written   PT Plan: Skilled PT  Onset Date: 01/22/24    Subjective  Patient reports he felt great after the manual treatment last visit. States that his legs felt more flexible. Has fashioned walking crutches out of broom sticks and uses them more often than his walker while moving around his  "house, because it is easier to ambulate with them.    Precautions  LAZAROADI Fall Risk Score (The score of 4 or more indicates an increased risk of falling): 7  Hearing/Visual Limitations: Hearing Aides  Medical Precautions: Fall precautions   Past Medical History Relevant to Rehab: CP, Spondylosis, Crushed Vertebra, Scoliosis     Pain   Pain Assessment: 0-10  Pain Score: 3  Pain Type: Acute pain  Pain Location: Knee    Objective   Posture  fwd head, rounded shoulders  Gait: Ambulates on even surface in clinic with [place] with a FWW with decreased heel strike, decreased velocity, decreased stride length, short, shuffling gait, increased trunk flexion while leaning on assistive device,  and BL Knee flexion with valgus; .  Sit to Stand Transfers: independent  Bed Mobility: independent     Treatments:   Therapeutic Exercise  Therapeutic Exercise Performed: Yes  Therapeutic Exercise Activity 1: Nustep for 8min - to increase functional mobility and tolerance with activity  Therapeutic Exercise Activity 2: supine hip ABD w/ green theraband 3xx10  Therapeutic Exercise Activity 3: Bridges wth ABD hip ABD w/ green theraband 3x10  Therapeutic Exercise Activity 4: LAQ with ball Squeeze in Sitting position: x15 BL    Neuromuscular Re-Education Performed: yes  Neuromuscular Re-Education 1:  STS from mat table 21in height w/ BUE reach up; CGA; VC for proper sequencing/positioning 3x10  Neuromuscular Re-Education 2: Standing blaze pods RLE heel strike in // bars w/ inez bar support w/ CGA w/ gait belt donned: 6 pods, 4 cycles, 1 color 30\"/30\" 6 hits max     Manual Therapy  Manual Therapy Performed: Yes  Manual Therapy Activity 1: PROM ofB LE: Hip flexion, Knee flexion, knee extension, hip add/abd/ER      OP EDUCATION: Inhibition tecghnique, HEP progression, NMR progression w/ focus on R heel strike, instructed to bring in his other assistive devices for safety, patient shown images of smart crutches, discussed community resource " of stretch lab     HEP Progression:   Access Code: [email or web address]  URL: [email or web address]  Date: 04/10/2024  Prepared by: Radha Daigle    Exercises  - Supine Bridge with Mini Swiss Ball Between Knees  - 1 x daily - 7 x weekly - 3 sets - 10 reps  - Small Range Straight Leg Raise  - 1 x daily - 7 x weekly - 3 sets - 10 reps  - Seated Long Arc Quad  - 1 x daily - 7 x weekly - 3 sets - 10 reps  - Sit to Stand with Counter Support  - 1 x daily - 7 x weekly - 3 sets - 10 reps  - Hooklying Clamshell with Resistance  - 1 x daily - 7 x weekly - 3 sets - 10 reps  - Bridge with Hip Abduction and Resistance  - 1 x daily - 7 x weekly - 3 sets - 10 reps

## 2024-04-15 ENCOUNTER — TREATMENT (OUTPATIENT)
Dept: PHYSICAL THERAPY | Facility: CLINIC | Age: 74
End: 2024-04-15
Payer: MEDICARE

## 2024-04-15 DIAGNOSIS — G80.9 CEREBRAL PALSY, UNSPECIFIED TYPE (MULTI): ICD-10-CM

## 2024-04-15 PROCEDURE — 97140 MANUAL THERAPY 1/> REGIONS: CPT | Mod: GP,CQ

## 2024-04-15 PROCEDURE — 97110 THERAPEUTIC EXERCISES: CPT | Mod: GP,CQ

## 2024-04-15 PROCEDURE — 97112 NEUROMUSCULAR REEDUCATION: CPT | Mod: GP,CQ

## 2024-04-15 NOTE — PROGRESS NOTES
Physical Therapy Treatment    Patient Name: Josh Kessler  MRN: [id number]  Today's Date: 4/15/2024  Time Calculation  Start Time: 1048  Stop Time: 1130  Time Calculation (min): 42 min  Billing:  Treatment:   Therapeutic Exercise:  10  Manual:  12  NMR: 20    Insurance  Visit #: 4  Insurance Reviewed (per information provided by  pre-cert team)  Authorization required:  No, Med nec, $20 co-pay, [employer] Medicare    Assessment:   Patient tolerated treatment well, did well with blaze pods progression, challenged with addition of word game, with decreased ability to coordinate R/LLE. .Improved hip flexibility palpated, with decreased tone post manual treatment. Patient educated on inhibition technique to decrease tone with fatigue when doing ADL's.  Patient needs continued skilled PT to work on decreasing spasticity in the BL LE and increasing LE strength to allow return to optimal level of functional mobility and tolerance with activities.   Plan:   OP PT Plan  Treatment/Interventions: Aquatic therapy, Cryotherapy, Education/ Instruction, Gait training, Manual therapy, Neuromuscular re-education, Orthosis fit/ training, Self care/ home management, Therapeutic activities, Therapeutic exercises  NV: Continue to progress standing BLE exercises in // bar.    Current Problem  Problem List Items Addressed This Visit             ICD-10-CM       Neuro    Cerebral palsy (Multi) G80.9       General   Reason for Referral: Cerebral Palsy/Thalamic Stroke  Referred By: DO Hussein   Preferred Learning Style: verbal, visual, written   PT Plan: Skilled PT  Onset Date: 01/22/24    Subjective  Patient reports he felt great after the manual treatment last visit. States that his legs felt more flexible. Has fashioned walking crutches out of broom sticks and uses them more often than his walker while moving around his house, because it is easier to ambulate with them.    Precautions  STEADI Fall Risk Score (The score of 4 or  "more indicates an increased risk of falling): 7  Hearing/Visual Limitations: Hearing Aides  Medical Precautions: Fall precautions   Past Medical History Relevant to Rehab: CP, Spondylosis, Crushed Vertebra, Scoliosis     Pain   Pain Assessment: 0-10  Pain Score: 3  Pain Type: Acute pain  Pain Location: Knee    Objective   Posture  fwd head, rounded shoulders  Gait: Ambulates on even surface in clinic with [place] with 2 sticks with increased step length than when using FWW  Sit to Stand Transfers: independent  Bed Mobility: independent   Function: Easier to get up from the floor, with increased leg flexibility, but legs continue to feel heavy after working hard, making walking difficult.     Treatments:   Therapeutic Exercise  Therapeutic Exercise Performed: Yes  Therapeutic Exercise Activity 1: Nustep for 10 min - to increase functional mobility and tolerance with activity  Therapeutic Exercise Activity 2: supine hip ABD w/ green theraband 3xx10 NV  Therapeutic Exercise Activity 3: Bridges wth ABD hip ABD w/ green theraband 3x10 NV  Therapeutic Exercise Activity 4: LAQ with ball Squeeze in Sitting position: x15 BL NV    Neuromuscular Re-Education:  Neuromuscular Re-Education 1:  STS w/ BUE reach up from mat table 21in height w/ BUE reach up; w/ close S VC for proper sequencing/positioning 3x10  Neuromuscular Re-Education 2: Standing blaze pods BLE heel strike in // bars w/ inez bar support w/ CGA w/ gait belt donned: 6 pods, 6 cycles, 2 color 30\"/30\" 611 hits max first 3 rounds, 8 hits max last 3 rounds, with word game added and 2 pods on [employer]  Neuromuscular Re-Education 3: Seated inhibition technique to BLE for decreased tonicity - WB/LTR 10\" x 5 each inez     Manual Therapy  Manual Therapy Performed: Yes  Manual Therapy Activity 1: PROM ofB LE: Hip flexion, Knee flexion, knee extension, hip add/abd/ER      OP EDUCATION: Inhibition tecghnique, HEP progression, NMR progression w/ focus on R heel strike,  " for sticks, inhibition technique    HEP Progression:   Access Code: [email or web address]  Added reach up to STS - ion good balance days only

## 2024-04-19 ENCOUNTER — PATIENT OUTREACH (OUTPATIENT)
Dept: CARE COORDINATION | Facility: CLINIC | Age: 74
End: 2024-04-19
Payer: MEDICARE

## 2024-04-19 NOTE — PROGRESS NOTES
90 day (final) call post hospital stay completed.  This CM called and spoke with pt  via phone to address any final questions or concerns regarding recent hospitalization.  Pt reports doing well and has no concerns at this time.  Contact info provided to pt for non-emergent concerns.

## 2024-04-22 ENCOUNTER — TREATMENT (OUTPATIENT)
Dept: PHYSICAL THERAPY | Facility: CLINIC | Age: 74
End: 2024-04-22
Payer: MEDICARE

## 2024-04-22 DIAGNOSIS — G80.9 CEREBRAL PALSY, UNSPECIFIED TYPE (MULTI): ICD-10-CM

## 2024-04-22 PROCEDURE — 97112 NEUROMUSCULAR REEDUCATION: CPT | Mod: GP,CQ

## 2024-04-22 PROCEDURE — 97140 MANUAL THERAPY 1/> REGIONS: CPT | Mod: GP,CQ

## 2024-04-22 PROCEDURE — 97110 THERAPEUTIC EXERCISES: CPT | Mod: GP,CQ

## 2024-04-22 NOTE — PROGRESS NOTES
Physical Therapy Treatment    Patient Name: Josh Kessler  MRN: [id number]  Today's Date: 4/15/2024  Time Calculation  Start Time: 1045  Stop Time: 1140  Time Calculation (min): 55 min  Billing:  Treatment:   Therapeutic Exercise:  12  Manual:  15  NMR: 18  Modalities: 10    Insurance  Visit #:5  Insurance Reviewed (per information provided by  pre-cert team)  Authorization required:  No, Med nec, $20 co-pay, [employer]    Assessment:   Patient tolerated treatment well, did well with  addition of ankle weights and standing progression. Challenged with dual task word game, with decreased ability to coordinate R/LLE. .Improved hip flexibility palpated, with decreased tone post manual treatment. Increased left shoulder pain post dual taks STS with reach up that decreased with cold pack.  Patient needs continued skilled PT to work on decreasing spasticity in the BL LE and increasing LE strength to allow return to optimal level of functional mobility and tolerance with activities.   Plan:   OP PT Plan  Treatment/Interventions: Aquatic therapy, Cryotherapy, Education/ Instruction, Gait training, Manual therapy, Neuromuscular re-education, Orthosis fit/ training, Self care/ home management, Therapeutic activities, Therapeutic exercises  NV: Continue to progress standing BLE exercises in // bar.    Current Problem  Problem List Items Addressed This Visit             ICD-10-CM       Neuro    Cerebral palsy (Multi) G80.9       General   Reason for Referral: Cerebral Palsy/Thalamic Stroke  Referred By: DO Hussein   Preferred Learning Style: verbal, visual, written   PT Plan: Skilled PT  Onset Date: 01/22/24    Subjective  Patient reports his left neck was stiff and left shoulder hurt over the weekend, but his neck felt better after lifting 5 bags of mulch out of the car.     Precautions  STEADI Fall Risk Score (The score of 4 or more indicates an increased risk of falling): 7  Hearing/Visual Limitations:  "Hearing Aides  Medical Precautions: Fall precautions   Past Medical History Relevant to Rehab: CP, Spondylosis, Crushed Vertebra, Scoliosis     Pain   Pain Assessment: 0-10  Pain Score: 3  Pain Type: Achy  Pain Location: L shoulder     Objective   Posture  fwd head, rounded shoulders  Gait: Ambulates on even surface in clinic with [place] with tollator with decreased step length  Sit to Stand Transfers: independent  Bed Mobility: independent   Function: Walking more using his sticks, when he doesn't have to carry things.     Treatments:   Therapeutic Exercise  Therapeutic Exercise Performed: Yes  Therapeutic Exercise Activity 1: Nustep for 10 min - to increase functional mobility and tolerance with activity  Therapeutic Exercise Activity 2: supine hip ABD w/ green theraband 3xx10 NV  Therapeutic Exercise Activity 3: Bridges wth ABD hip ABD w/ green theraband 3x10 NV  Therapeutic Exercise Activity 4: BLE 2# ankle weights LAQ with ball Squeeze in sitting position: x15 BL     Neuromuscular Re-Education:  Neuromuscular Re-Education 1:  STS w/ BUE reach up from jose enrique disc w/ BUE reach up; w/ close S - CGA VC for proper sequencing/positioning 3x10  Neuromuscular Re-Education 2: BLE 2# ankle weights Standing blaze pods BLE  Arex, jose enrique disc in // bars w/ inez bar support w/ close S 5 pods, 6 cycles, 2 color 30\"/30\" 21 hits max  Neuromuscular Re-Education 3: BLE 2# ankle weights Airex step ups w/ inez // support w/ CGA x 10 each BLE lead   Neuromuscular Re-Education 4: BLE 2# ankle weights Airex march x 20    Manual Therapy  Manual Therapy Performed: Yes  Manual Therapy Activity 1: PROM of B LE: Hip flexion, Knee flexion, knee extension, hip add/abd/ER    Modalities  Seated L shoulder CP 10 minutes      OP EDUCATION: Inhibition tecghnique, HEP progression, NMR progression w/ focus on R heel strike, home icing to reduce shoulder pain    HEP Progression:   Access Code: [email or web address]  Added reach up to STS - ion good balance days " only

## 2024-04-29 ENCOUNTER — TREATMENT (OUTPATIENT)
Dept: PHYSICAL THERAPY | Facility: CLINIC | Age: 74
End: 2024-04-29
Payer: MEDICARE

## 2024-04-29 DIAGNOSIS — G80.9 CEREBRAL PALSY, UNSPECIFIED TYPE (MULTI): ICD-10-CM

## 2024-04-29 PROCEDURE — 97112 NEUROMUSCULAR REEDUCATION: CPT | Mod: GP

## 2024-04-29 PROCEDURE — 97140 MANUAL THERAPY 1/> REGIONS: CPT | Mod: GP

## 2024-04-29 PROCEDURE — 97110 THERAPEUTIC EXERCISES: CPT | Mod: GP

## 2024-04-29 ASSESSMENT — PAIN - FUNCTIONAL ASSESSMENT: PAIN_FUNCTIONAL_ASSESSMENT: 0-10

## 2024-04-29 ASSESSMENT — PAIN DESCRIPTION - DESCRIPTORS: DESCRIPTORS: SORE

## 2024-04-29 ASSESSMENT — PAIN SCALES - GENERAL: PAINLEVEL_OUTOF10: 3

## 2024-04-29 NOTE — PROGRESS NOTES
Physical Therapy Treatment    Patient Name: Josh Kessler  MRN: [id number]  Today's Date: 4/29/2024  Time Calculation  Start Time: 1100  Stop Time: 1141  Time Calculation (min): 41 min  Billing:  Treatment:   Therapeutic Exercise:  20  NMR:  11  Manual:  10    Insurance  Visit #:6  Insurance Reviewed (per information provided by  pre-cert team)  Authorization required:  No, Med nec, $20 co-pay, [employer]    Assessment:  PT Assessment  PT Assessment Results: Decreased strength, Decreased mobility, Impaired balance, Decreased range of motion  Evaluation/Treatment Tolerance: Patient tolerated treatment well  pt tolerated treatment well, did well with progression of standing LE hip strengthening exercises in // bars and blaze pod activities. pt needs continued skilled PT to work on balance, mobility, tissue extensibility, and strength to allow return to optimal level of functional mobility and tolerance with activities. pt left session with all questions answered.     Plan:  OP PT Plan  Treatment/Interventions: Aquatic therapy, Cryotherapy, Education/ Instruction, Gait training, Manual therapy, Neuromuscular re-education, Orthosis fit/ training, Self care/ home management, Therapeutic activities, Therapeutic exercises  PT Plan: Skilled PT  Onset Date: 01/22/24  NV: Re-Check    Current Problem  Problem List Items Addressed This Visit             ICD-10-CM    Cerebral palsy (Multi) G80.9       General  PT  Visit  PT Received On: 04/29/24  Response to Previous Treatment: Patient with no complaints from previous session.  General  Reason for Referral: Cerebral Palsy/Thalamic Stroke  Referred By: DO Hussein  Past Medical History Relevant to Rehab: CP, Spondylosis, Crushed Vertebra, Scoliosis  Preferred Learning Style: verbal, visual, written  General Comment: Reports he did too much organizing this weekend, cut the grass; his plan was to go with the weather; cleaned his entire house this past saturday.  No falls reported. Patients states he has been completing his HEP on the floor because he find it better than his bed    Subjective    Precautions  Precautions  STEADI Fall Risk Score (The score of 4 or more indicates an increased risk of falling): 7  Hearing/Visual Limitations: Hearing Aides  Medical Precautions: Fall precautions  Pain  Pain Assessment  Pain Assessment: 0-10  Pain Score: 3  Pain Type: Acute pain  Pain Location: Generalized  Pain Descriptors: Sore    Objective   Posture  Postural Control  Posture Comment: Slight fwd head, rounded shoulders    Gait: The patient is ambulating with the following device: rollator. Gait deviations include: Lacks heel strike, Decreased velocity, Decreased stride length, Shuffling, Downward gaze, and Forward flexed.  Sit to Stand Transfers: independent and modified independent  Bed Mobility: independent and minimal assist for Sup to sit (UE assist)      Treatments:  Therapeutic Exercise  Therapeutic Exercise Performed: Yes  Therapeutic Exercise Activity 1: Nustep for 8min; work level 3- to increase functional mobility and tolerance with activity  Therapeutic Exercise Activity 2: Standing Marches 3x10  Therapeutic Exercise Activity 3: Standing Hip extension;abd; flex  in // bars x15 each Close supervision in // bars     Balance/Neuromuscular Re-Education  Balance/Neuromuscular Re-Education Activity Performed: Yes  Balance/Neuromuscular Re-Education Activity 1: Blaze Pod: STS w/ BUE reach up from 20in height w/ BUE reach up; w/ close S VC for proper sequencing/positioning 3x45s; 1 min break in btw sets; max hits - gait belt   Balance/Neuromuscular Re-Education Activity 2: Standing blaze pods BLE heel strike at / bar with step stool HR for support with support w/ CGA w/ gait belt donned: 5 pods; 3x30s; with 30s breaks    Manual Therapy  Manual Therapy Performed: Yes  Manual Therapy Activity 1: PROM of LE: Hip flexion, Knee flexion, knee extension, hip add/abd BL    OP  EDUCATION:  Outpatient Education  Individual(s) Educated: Patient  Education Provided: Anatomy, Body Mechanics, Home Exercise Program, Home Safety, POC, Posture  Patient/Caregiver Demonstrated Understanding: yes  Patient Response to Education: Patient/Caregiver Performed Return Demonstration of Exercises/Activities, Patient/Caregiver Verbalized Understanding of Information, Patient/Caregiver Asked Appropriate Questions

## 2024-05-06 ENCOUNTER — TREATMENT (OUTPATIENT)
Dept: PHYSICAL THERAPY | Facility: CLINIC | Age: 74
End: 2024-05-06
Payer: MEDICARE

## 2024-05-06 DIAGNOSIS — G80.9 CEREBRAL PALSY, UNSPECIFIED TYPE (MULTI): ICD-10-CM

## 2024-05-06 PROCEDURE — 97110 THERAPEUTIC EXERCISES: CPT | Mod: GP

## 2024-05-06 PROCEDURE — 97140 MANUAL THERAPY 1/> REGIONS: CPT | Mod: GP

## 2024-05-06 PROCEDURE — 97530 THERAPEUTIC ACTIVITIES: CPT | Mod: GP

## 2024-05-06 ASSESSMENT — BALANCE ASSESSMENTS
STANDING UNSUPPORTED WITH FEET TOGETHER: ABLE TO PLACE FEET TOGETHER INDEPENDENTLY AND STAND 1 MINUTE SAFELY
LONG VERSION TOTAL SCORE (MAX 56): 42
STANDING TO SITTING: ABLE TO STAND INDEPENDENTLY USING HANDS
SITTING WITH BACK UNSUPPORTED BUT FEET SUPPORTED ON FLOOR OR ON A STOOL: ABLE TO SIT SAFELY AND SECURELY FOR 2 MINUTES
PICK UP OBJECT FROM THE FLOOR FROM A STANDING POSITION: ABLE TO PICK UP SLIPPER SAFELY AND EASILY
STANDING ON ONE LEG: ABLE TO LIFT LEG INDEPENDENTLY AND HOLD GREATER THAN 10 SECONDS
TURN 360 DEGREES: ABLE TO TURN 360 DEGREES SAFELY BUT SLOWLY
PLACE ALTERNATE FOOT ON STEP OR STOOL WHILE STANDING UNSUPPORTED: ABLE TO COMPLETE GREATER THAN 2 STEPS NEEDS MINIMAL ASSIST
PLACE ALTERNATE FOOT ON STEP OR STOOL WHILE STANDING UNSUPPORTED: TURNS SIDEWAYS ONLY BUT MAINTAINS BALANCE
REACHING FORWARD WITH OUTSTRETCHED ARM WHILE STANDING: CAN REACH FORWARD 5 CM (2 INCHES)
TRANSFERS: ABLE TO TRANSFER SAFELY DEFINITE NEED OF HANDS
STANDING UNSUPPORTED: ABLE TO STAND SAFELY FOR 2 MINUTES
STANDING UNSUPPORTED WITH EYES CLOSED: ABLE TO STAND 10 SECONDS SAFELY
STANDING UNSUPPORTED ONE FOOT IN FRONT: ABLE TO PLACE FOOT AHEAD INDEPENDENTLY AND HOLD 30 SECONDS
STANDING TO SITTING: USES BACK OF LEGS AGAINST CHAIR TO CONTROL DESCENT

## 2024-05-06 ASSESSMENT — PAIN DESCRIPTION - DESCRIPTORS: DESCRIPTORS: SHARP

## 2024-05-06 ASSESSMENT — PAIN - FUNCTIONAL ASSESSMENT: PAIN_FUNCTIONAL_ASSESSMENT: 0-10

## 2024-05-06 ASSESSMENT — PAIN SCALES - GENERAL: PAINLEVEL_OUTOF10: 4

## 2024-05-06 NOTE — PROGRESS NOTES
Physical Therapy Re-Assessment & Treatment     Patient Name: Josh Kessler  MRN: [id number]  Today's Date: 5/6/2024  Time Calculation  Start Time: 1115  Stop Time: 1157  Time Calculation (min): 42 min  Billing:  Treatment:   Therapeutic Exercise:  8  Manual:  12  Therapeutic Activity:  22    Insurance  Visit #:7  Insurance Reviewed (per information provided by  pre-cert team)  Authorization required:  No, Med nec, $20 co-pay, [employer]    Assessment:  PT Assessment  PT Assessment Results: Decreased strength, Decreased mobility, Impaired balance, Decreased range of motion  Evaluation/Treatment Tolerance: Patient tolerated treatment well  Pt presents to physical therapy as a re-evaluation. At this time pt continues to demonstrate deficits in strength, mobility, gait/stair dysfunction, balance, and decreased tolerance with activity.  pt would benefit from continued skilled physical therapy to maximize pt safety, increase tolerance to activity and to address impairments to restore PLOF with ADLs, functional mobility and participation in activities.      Plan:  OP PT Plan  Treatment/Interventions: Aquatic therapy, Cryotherapy, Education/ Instruction, Gait training, Manual therapy, Neuromuscular re-education, Orthosis fit/ training, Self care/ home management, Therapeutic activities, Therapeutic exercises  PT Plan: Skilled PT  PT Frequency: 1 time per week  Duration: 6 weeks  Onset Date: 01/22/24  Rehab Potential: Fair  Plan of Care Agreement: Patient  NV: PROM to LE; dynamic balance, Corner balance HEP     Current Problem  Problem List Items Addressed This Visit             ICD-10-CM    Cerebral palsy (Multi) G80.9    Relevant Orders    Follow Up In Physical Therapy       General  PT  Visit  PT Received On: 05/06/24  Response to Previous Treatment: Patient with no complaints from previous session.  General  Reason for Referral: Cerebral Palsy/Thalamic Stroke  Referred By: DO Hussein  Past Medical  History Relevant to Rehab: CP, Spondylosis, Crushed Vertebra, Scoliosis  Preferred Learning Style: verbal, visual, written  General Comment: Records all his exercises - in order for insurance to pay him. He has everything written down in his red notebook. Says he was very busy this weekend - and was busy with filling his car up and emptying. Completed more stairs than he antcipated (6-8). Reports therapy has been helping him. No falls reported. Reports he is hoping to go to the pool on thursday - in [facility].    Subjective    Precautions  Precautions  STEADI Fall Risk Score (The score of 4 or more indicates an increased risk of falling): 7  Hearing/Visual Limitations: Hearing Aides  Medical Precautions: Fall precautions  Pain  Pain Assessment  Pain Assessment: 0-10  Pain Score: 4  Pain Type: Acute pain  Pain Location: Knee  Pain Orientation: Right  Pain Descriptors: Sharp  Pain Frequency: Intermittent    Objective   LE Dermatomes: WFL      Coordination: NT at re-check   Finger to nose: minimal deficits BL   Opposition: R side is slower than L   OSIRIS UE: WFL  Heel to Shin: R sided deficits, unable to have heel go up all the way on the shin   Alt Toe Taps: Decreased R LE toe taps vs. L LE      Functional Assessments:  Bed Mobility Comment: IND with sup/sit and sit/sup - close supervision  Transfers Comment: Mod I - use of UE assist     Extremity/Trunk Assessments:  RLE   RLE : Exceptions to WFL  Strength RLE  R Hip Flexion: 4+/5 --> same  R Hip Extension: 4-/5  R Hip ABduction: 3+/5 --> same  R Hip Add: 4/5  R Knee Flexion: 4+/5 --> same  R Knee Extension: 4+/5 --> same  R Ankle Dorsiflexion: 4-/5 --> 4/5   R Ankle Plantar Flexion: 5/5  Tone RLE  RLE Tone: Hypertonic  Tone Assessment: Spasticity with hip flexion; hip ER; Abd and Add     LLE   LLE : Exceptions to WFL  AROM LLE (degrees)  LLE AROM Comment: WFL  PROM LLE (degrees)  LLE PROM Comment: Spasticity with hip flexion; hip ER; Abd and Add  Strength LLE  L Hip  Flexion: 4+/5 --> same  L Hip Extension: 4-/5  L Hip ABduction: 4-/5 --> 4/5   L Hip Add: 4+/5  L Knee Flexion: 5/5  L Knee Extension: 5/5  L Ankle Dorsiflexion: 5/5  L Ankle Plantar Flexion: 5/5  Tone LLE  LLE Tone: Hypertonic;  Spasticity with hip flexion; hip ER; Abd and Add     Gait: Ambulates with a rollator; flexed trunk posture, BL foot drag and little to no active DF with gait cycle. Decreased hip flexion and hip extension for swing phase. Small step length, decreased noel.      Stairs: Ascends 4 6in steps in a reciprocal pattern with decreased ability for proper foot clearance; BL use of HR; use of gait belt and CGA  to ensure safety; descends 4 6in steps in a step to step pattern BWD's with BL use of HR at CGA from therapist for safety;    Outcome Measures:  Whiting Balance Scale  1. Sitting to Standing: Able to stand independently using hands  2. Standing Unsupported: Able to stand safely for 2 minutes  3. Sitting with Back Unsupported but Feet Supported on Floor or on a Stool: Able to sit safely and securely for 2 minutes  4. Standing to Sitting: Uses back of legs against chair to control descent  5.  Transfers: Able to transfer safely definite need of hands  6. Standing Unsupported with Eyes Closed: Able to stand 10 seconds safely  7. Standing Unsupported with Feet Together: Able to place feet together independently and stand 1 minute safely  8. Reach Forward with Outstretched Arm While Standing: Can reach forward 5 cm (2 inches)  9.  Object from Floor from a Standing Position: Able to  slipper safely and easily  10. Turning to Look Behind Over Left and Right Shoulders While Standing: Turns sideways only but maintains balance  11. Turn 360 Degrees: Able to turn 360 degrees safely but slowly  12. Place Alternate Foot on Step or Stool While Standing Unsupported: Able to complete greater than 2 steps needs minimal assist  13. Standing Unsupported One Foot in Front: Able to place foot ahead  independently and hold 30 seconds  14. Standing on One Leg: Able to lift leg independently and hold greater than 10 seconds  Ingram Balance Score: 42    Timed Up and Go Test  How many seconds did it take to complete the 5 tasks?: 19 seconds  Observe the patient’s postural stability, gait, stride length, and sway. Select All that Apply:: Slow tentative pace (Gait belt; use of rollator)    Other Measures  5x Sit to Stand: 26.68s with R arm UE assist; standard chair height; walker in front    Treatments:  Therapeutic Exercise  Therapeutic Exercise Performed: Yes  Therapeutic Exercise Activity 1: Nustep for 8min; work level 3- to increase functional mobility and tolerance with activity    Therapeutic Activity  Therapeutic Activity Performed: Yes  Therapeutic Activity 1: Re-Assessment  1. Repeated sit to/from stands from standardized chair height with use of 1 UE assist. Required VC for no use of hands, nose over toes, full upright stand from chair, use of anterior shift with fwd momentum and eccentric control into chair.  2. Functional Performance via gait analysis of TUG: Verbal cues for sequencing/positioning. 2x10' at CGA with use of gait belt.   3. Functional mobility via AROM/PROM of LE; Verbal cues for sequencing/positioning.  4. Functional Performance via MMT of LE: Hip, knee, ankle; Verbal cues for sequencing/positioning.  5. Educated pt on POC, goals of physical therapy, purpose of physical therapy, as well as the benefits in participating in physical therapy to increase functional mobility and maximize pt safety.   6. Functional performance via INGRAM: Gait belt; Mod VC for proper sequencing/positioning     Manual Therapy  Manual Therapy Performed: Yes  Manual Therapy Activity 1: PROM of LE: Hip flexion, Knee flexion, knee extension, hip add/abd BL 8-10 reps each direction    OP EDUCATION:  Outpatient Education  Individual(s) Educated: Patient  Education Provided: Anatomy, Body Mechanics, POC, Posture, Home  Safety  Risk and Benefits Discussed with Patient/Caregiver/Other: yes  Patient/Caregiver Demonstrated Understanding: yes  Plan of Care Discussed and Agreed Upon: yes  Patient Response to Education: Patient/Caregiver Performed Return Demonstration of Exercises/Activities, Patient/Caregiver Verbalized Understanding of Information, Patient/Caregiver Asked Appropriate Questions    Goals: Re-Check 5/6/24  STG: pt will be independent in HEP & symptom management (MET)      LTGs:  Pain: pt will demonstrate a 2 pt improvement for pain on the VAS scale, allowing for improved tolerance to perform daily functional activities.      Strength: Pt will improve B LE Strength to >/= 4+/5 to increase functional performance, tolerance to activity, and enhancing pt safety to particpate in ADLs and IADLs.     Gait: pt will demonstrate normal mechanics without pain during gait and performance of stairs, allowing for pt to return to navigating the community.      TUG: pt will complete TUG within 12 seconds or less (indicative of higher fall risk) in order to INC balance and enhance safety during ADLs/ IADLs. (> or equal to 12 seconds indicates high risk for falls in older adults. 11-20 seconds is normal for the frail and elderly.) OR MCID 3.4s Baseline 19.49 sec with use of Rollator --> 19s     5xSTS: pt will perform 5x sit to  < 15 seconds to decrease fall risk. OR MCID 2.3s Baseline 27.48  sec --> 26.68s     INGRAM: Pt will score >/= 45 on the INGRAM to indicate improved functional mobility, static/dynamic balance, and to decrease fall risk.  Base Score = 44; Re-Check 42      Stairs: pt will ascend/descend step over step (6in step) with proper gait mechanics and use of <1HR to promote functional mobility and improve functional performance.

## 2024-05-15 ENCOUNTER — DOCUMENTATION (OUTPATIENT)
Dept: PHYSICAL THERAPY | Facility: CLINIC | Age: 74
End: 2024-05-15
Payer: MEDICARE

## 2024-05-15 NOTE — PROGRESS NOTES
Physical Therapy    Discharge Summary    Name: Josh Kessler  MRN: [id number]  : 1950  Date: 05/15/24    Discharge Summary: PT    Discharge Status: Pt called to cancel upcoming PT appointments due to having no longer time for PT. pt last seen 24. pt has not been seen for follow up since that date, last note was a re-assessment - that note will stand as the DC.  DISCHARGE    Rehab Discharge Reason: Failed to schedule and/or keep follow-up appointment(s)

## 2024-05-17 ENCOUNTER — APPOINTMENT (OUTPATIENT)
Dept: PHYSICAL THERAPY | Facility: CLINIC | Age: 74
End: 2024-05-17
Payer: MEDICARE

## 2024-05-22 ENCOUNTER — APPOINTMENT (OUTPATIENT)
Dept: PHYSICAL THERAPY | Facility: CLINIC | Age: 74
End: 2024-05-22
Payer: MEDICARE

## 2024-05-29 ENCOUNTER — APPOINTMENT (OUTPATIENT)
Dept: PHYSICAL THERAPY | Facility: CLINIC | Age: 74
End: 2024-05-29
Payer: MEDICARE

## 2024-06-03 ENCOUNTER — APPOINTMENT (OUTPATIENT)
Dept: PHYSICAL THERAPY | Facility: CLINIC | Age: 74
End: 2024-06-03
Payer: MEDICARE

## 2024-06-10 ENCOUNTER — APPOINTMENT (OUTPATIENT)
Dept: PHYSICAL THERAPY | Facility: CLINIC | Age: 74
End: 2024-06-10
Payer: MEDICARE

## 2024-06-17 ENCOUNTER — APPOINTMENT (OUTPATIENT)
Dept: PHYSICAL THERAPY | Facility: CLINIC | Age: 74
End: 2024-06-17
Payer: MEDICARE

## 2024-06-20 ENCOUNTER — HOSPITAL ENCOUNTER (OUTPATIENT)
Facility: HOSPITAL | Age: 74
Setting detail: OBSERVATION
Discharge: NURSING, REHAB OR LONG-TERM CARE | End: 2024-06-20
Attending: STUDENT IN AN ORGANIZED HEALTH CARE EDUCATION/TRAINING PROGRAM | Admitting: INTERNAL MEDICINE
Payer: MEDICARE

## 2024-06-20 ENCOUNTER — APPOINTMENT (OUTPATIENT)
Dept: RADIOLOGY | Facility: HOSPITAL | Age: 74
End: 2024-06-20
Payer: MEDICARE

## 2024-06-20 DIAGNOSIS — M54.31 SCIATICA, RIGHT SIDE: ICD-10-CM
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
## 2024-06-20 DIAGNOSIS — R26.2 DIFFICULTY WALKING: ICD-10-CM

## 2024-06-20 DIAGNOSIS — R53.1 GENERALIZED WEAKNESS: ICD-10-CM
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
## 2024-06-20 LAB
ANION GAP SERPL CALC-SCNC: 11 MMOL/L (ref 10–20)
BASOPHILS # BLD AUTO: 0.05 X10*3/UL (ref 0–0.1)
BASOPHILS NFR BLD AUTO: 0.3 %
BUN SERPL-MCNC: 19 MG/DL (ref 6–23)
CALCIUM SERPL-MCNC: 9.4 MG/DL (ref 8.6–10.3)
CHLORIDE SERPL-SCNC: 102 MMOL/L (ref 98–107)
CO2 SERPL-SCNC: 29 MMOL/L (ref 21–32)
CREAT SERPL-MCNC: 1.03 MG/DL (ref 0.5–1.3)
EGFRCR SERPLBLD CKD-EPI 2021: 75 ML/MIN/1.73M*2
EOSINOPHIL # BLD AUTO: 0.05 X10*3/UL (ref 0–0.4)
EOSINOPHIL NFR BLD AUTO: 0.3 %
ERYTHROCYTE [DISTWIDTH] IN BLOOD BY AUTOMATED COUNT: 12.1 % (ref 11.5–14.5)
GLUCOSE SERPL-MCNC: 93 MG/DL (ref 74–99)
HCT VFR BLD AUTO: 45.2 % (ref 41–52)
HGB BLD-MCNC: 15.8 G/DL (ref 13.5–17.5)
IMM GRANULOCYTES # BLD AUTO: 0.06 X10*3/UL (ref 0–0.5)
IMM GRANULOCYTES NFR BLD AUTO: 0.3 % (ref 0–0.9)
LYMPHOCYTES # BLD AUTO: 1.16 X10*3/UL (ref 0.8–3)
LYMPHOCYTES NFR BLD AUTO: 6.3 %
MCH RBC QN AUTO: 33.9 PG (ref 26–34)
MCHC RBC AUTO-ENTMCNC: 35 G/DL (ref 32–36)
MCV RBC AUTO: 97 FL (ref 80–100)
MONOCYTES # BLD AUTO: 1.27 X10*3/UL (ref 0.05–0.8)
MONOCYTES NFR BLD AUTO: 6.9 %
NEUTROPHILS # BLD AUTO: 15.91 X10*3/UL (ref 1.6–5.5)
NEUTROPHILS NFR BLD AUTO: 85.9 %
NRBC BLD-RTO: 0 /100 WBCS (ref 0–0)
PLATELET # BLD AUTO: 172 X10*3/UL (ref 150–450)
POTASSIUM SERPL-SCNC: 4.1 MMOL/L (ref 3.5–5.3)
RBC # BLD AUTO: 4.66 X10*6/UL (ref 4.5–5.9)
SODIUM SERPL-SCNC: 138 MMOL/L (ref 136–145)
WBC # BLD AUTO: 18.5 X10*3/UL (ref 4.4–11.3)

## 2024-06-20 PROCEDURE — 80048 BASIC METABOLIC PNL TOTAL CA: CPT | Performed by: NURSE PRACTITIONER

## 2024-06-20 PROCEDURE — 85025 COMPLETE CBC W/AUTO DIFF WBC: CPT | Performed by: NURSE PRACTITIONER

## 2024-06-20 PROCEDURE — 73502 X-RAY EXAM HIP UNI 2-3 VIEWS: CPT | Mod: RIGHT SIDE | Performed by: RADIOLOGY

## 2024-06-20 PROCEDURE — 72125 CT NECK SPINE W/O DYE: CPT

## 2024-06-20 PROCEDURE — 72131 CT LUMBAR SPINE W/O DYE: CPT | Performed by: RADIOLOGY

## 2024-06-20 PROCEDURE — 73502 X-RAY EXAM HIP UNI 2-3 VIEWS: CPT | Mod: RT

## 2024-06-20 PROCEDURE — G0378 HOSPITAL OBSERVATION PER HR: HCPCS

## 2024-06-20 PROCEDURE — 36415 COLL VENOUS BLD VENIPUNCTURE: CPT | Performed by: NURSE PRACTITIONER

## 2024-06-20 PROCEDURE — 70450 CT HEAD/BRAIN W/O DYE: CPT

## 2024-06-20 PROCEDURE — 99223 1ST HOSP IP/OBS HIGH 75: CPT | Performed by: INTERNAL MEDICINE

## 2024-06-20 PROCEDURE — 2500000004 HC RX 250 GENERAL PHARMACY W/ HCPCS (ALT 636 FOR OP/ED)

## 2024-06-20 PROCEDURE — 73700 CT LOWER EXTREMITY W/O DYE: CPT | Mod: RT

## 2024-06-20 PROCEDURE — 90715 TDAP VACCINE 7 YRS/> IM: CPT | Performed by: NURSE PRACTITIONER

## 2024-06-20 PROCEDURE — 2500000004 HC RX 250 GENERAL PHARMACY W/ HCPCS (ALT 636 FOR OP/ED): Performed by: NURSE PRACTITIONER

## 2024-06-20 PROCEDURE — 90471 IMMUNIZATION ADMIN: CPT | Performed by: NURSE PRACTITIONER

## 2024-06-20 PROCEDURE — 2500000001 HC RX 250 WO HCPCS SELF ADMINISTERED DRUGS (ALT 637 FOR MEDICARE OP)

## 2024-06-20 PROCEDURE — 70450 CT HEAD/BRAIN W/O DYE: CPT | Performed by: RADIOLOGY

## 2024-06-20 PROCEDURE — 99285 EMERGENCY DEPT VISIT HI MDM: CPT

## 2024-06-20 PROCEDURE — 72128 CT CHEST SPINE W/O DYE: CPT

## 2024-06-20 PROCEDURE — 96372 THER/PROPH/DIAG INJ SC/IM: CPT

## 2024-06-20 PROCEDURE — 73700 CT LOWER EXTREMITY W/O DYE: CPT | Mod: RIGHT SIDE | Performed by: RADIOLOGY

## 2024-06-20 PROCEDURE — 72125 CT NECK SPINE W/O DYE: CPT | Performed by: RADIOLOGY

## 2024-06-20 PROCEDURE — 72128 CT CHEST SPINE W/O DYE: CPT | Performed by: RADIOLOGY

## 2024-06-20 PROCEDURE — 72131 CT LUMBAR SPINE W/O DYE: CPT

## 2024-06-20 RX ORDER — ACETAMINOPHEN 650 MG/1
650 SUPPOSITORY RECTAL EVERY 4 HOURS PRN
Status: ACTIVE | OUTPATIENT
Start: 2024-06-20

## 2024-06-20 RX ORDER — ENOXAPARIN SODIUM 100 MG/ML
40 INJECTION SUBCUTANEOUS EVERY 24 HOURS
Status: DISPENSED | OUTPATIENT
Start: 2024-06-20

## 2024-06-20 RX ORDER — ASPIRIN 81 MG/1
81 TABLET ORAL DAILY
Status: DISPENSED | OUTPATIENT
Start: 2024-06-20

## 2024-06-20 RX ORDER — MORPHINE SULFATE 2 MG/ML
1 INJECTION, SOLUTION INTRAMUSCULAR; INTRAVENOUS EVERY 4 HOURS PRN
Status: DISCONTINUED | OUTPATIENT
Start: 2024-06-20 | End: 2024-06-22

## 2024-06-20 RX ORDER — ACETAMINOPHEN 325 MG/1
650 TABLET ORAL EVERY 4 HOURS PRN
Status: DISPENSED | OUTPATIENT
Start: 2024-06-20

## 2024-06-20 RX ORDER — ACETAMINOPHEN 160 MG/5ML
650 SOLUTION ORAL EVERY 4 HOURS PRN
Status: ACTIVE | OUTPATIENT
Start: 2024-06-20

## 2024-06-20 RX ORDER — ASPIRIN 81 MG/1
81 TABLET ORAL DAILY
Status: ON HOLD | COMMUNITY

## 2024-06-20 RX ORDER — ATORVASTATIN CALCIUM 40 MG/1
40 TABLET, FILM COATED ORAL NIGHTLY
Status: DISPENSED | OUTPATIENT
Start: 2024-06-20

## 2024-06-20 SDOH — SOCIAL STABILITY: SOCIAL INSECURITY: WERE YOU ABLE TO COMPLETE ALL THE BEHAVIORAL HEALTH SCREENINGS?: YES

## 2024-06-20 SDOH — SOCIAL STABILITY: SOCIAL INSECURITY: DO YOU FEEL ANYONE HAS EXPLOITED OR TAKEN ADVANTAGE OF YOU FINANCIALLY OR OF YOUR PERSONAL PROPERTY?: NO

## 2024-06-20 SDOH — SOCIAL STABILITY: SOCIAL INSECURITY: HAVE YOU HAD THOUGHTS OF HARMING ANYONE ELSE?: NO

## 2024-06-20 SDOH — SOCIAL STABILITY: SOCIAL INSECURITY: HAS ANYONE EVER THREATENED TO HURT YOUR FAMILY OR YOUR PETS?: NO

## 2024-06-20 SDOH — SOCIAL STABILITY: SOCIAL INSECURITY: ABUSE: ADULT

## 2024-06-20 SDOH — SOCIAL STABILITY: SOCIAL INSECURITY: ARE THERE ANY APPARENT SIGNS OF INJURIES/BEHAVIORS THAT COULD BE RELATED TO ABUSE/NEGLECT?: NO

## 2024-06-20 SDOH — SOCIAL STABILITY: SOCIAL INSECURITY: ARE YOU OR HAVE YOU BEEN THREATENED OR ABUSED PHYSICALLY, EMOTIONALLY, OR SEXUALLY BY ANYONE?: NO

## 2024-06-20 SDOH — SOCIAL STABILITY: SOCIAL INSECURITY: DO YOU FEEL UNSAFE GOING BACK TO THE PLACE WHERE YOU ARE LIVING?: NO

## 2024-06-20 SDOH — SOCIAL STABILITY: SOCIAL INSECURITY: DOES ANYONE TRY TO KEEP YOU FROM HAVING/CONTACTING OTHER FRIENDS OR DOING THINGS OUTSIDE YOUR HOME?: NO

## 2024-06-20 SDOH — SOCIAL STABILITY: SOCIAL INSECURITY: HAVE YOU HAD ANY THOUGHTS OF HARMING ANYONE ELSE?: NO

## 2024-06-20 ASSESSMENT — PAIN SCALES - GENERAL
PAINLEVEL_OUTOF10: 0 - NO PAIN
PAINLEVEL_OUTOF10: 6

## 2024-06-20 ASSESSMENT — LIFESTYLE VARIABLES
HOW OFTEN DO YOU HAVE A DRINK CONTAINING ALCOHOL: NEVER
HAVE YOU EVER FELT YOU SHOULD CUT DOWN ON YOUR DRINKING: NO
SKIP TO QUESTIONS 9-10: 1
HAVE PEOPLE ANNOYED YOU BY CRITICIZING YOUR DRINKING: NO
HOW MANY STANDARD DRINKS CONTAINING ALCOHOL DO YOU HAVE ON A TYPICAL DAY: PATIENT DOES NOT DRINK
HOW OFTEN DO YOU HAVE 6 OR MORE DRINKS ON ONE OCCASION: NEVER
EVER FELT BAD OR GUILTY ABOUT YOUR DRINKING: NO
EVER HAD A DRINK FIRST THING IN THE MORNING TO STEADY YOUR NERVES TO GET RID OF A HANGOVER: NO
AUDIT-C TOTAL SCORE: 0
TOTAL SCORE: 0
AUDIT-C TOTAL SCORE: 0

## 2024-06-20 ASSESSMENT — COGNITIVE AND FUNCTIONAL STATUS - GENERAL
DRESSING REGULAR LOWER BODY CLOTHING: A LITTLE
CLIMB 3 TO 5 STEPS WITH RAILING: TOTAL
TURNING FROM BACK TO SIDE WHILE IN FLAT BAD: A LITTLE
MOBILITY SCORE: 13
STANDING UP FROM CHAIR USING ARMS: A LOT
WALKING IN HOSPITAL ROOM: TOTAL
MOVING TO AND FROM BED TO CHAIR: A LOT
HELP NEEDED FOR BATHING: A LITTLE
PATIENT BASELINE BEDBOUND: NO
DAILY ACTIVITIY SCORE: 22

## 2024-06-20 ASSESSMENT — ACTIVITIES OF DAILY LIVING (ADL)
LACK_OF_TRANSPORTATION: NO
JUDGMENT_ADEQUATE_SAFELY_COMPLETE_DAILY_ACTIVITIES: YES
WALKS IN HOME: INDEPENDENT
TOILETING: INDEPENDENT
PATIENT'S MEMORY ADEQUATE TO SAFELY COMPLETE DAILY ACTIVITIES?: YES
ADEQUATE_TO_COMPLETE_ADL: YES
HEARING - LEFT EAR: HEARING AID
BATHING: INDEPENDENT
GROOMING: INDEPENDENT
HEARING - RIGHT EAR: HEARING AID
FEEDING YOURSELF: INDEPENDENT
DRESSING YOURSELF: INDEPENDENT

## 2024-06-20 ASSESSMENT — PAIN DESCRIPTION - LOCATION: LOCATION: HIP

## 2024-06-20 ASSESSMENT — PAIN DESCRIPTION - DESCRIPTORS: DESCRIPTORS: ACHING

## 2024-06-20 ASSESSMENT — PAIN DESCRIPTION - ORIENTATION: ORIENTATION: RIGHT

## 2024-06-20 ASSESSMENT — PATIENT HEALTH QUESTIONNAIRE - PHQ9
2. FEELING DOWN, DEPRESSED OR HOPELESS: NOT AT ALL
SUM OF ALL RESPONSES TO PHQ9 QUESTIONS 1 & 2: 0
1. LITTLE INTEREST OR PLEASURE IN DOING THINGS: NOT AT ALL

## 2024-06-20 ASSESSMENT — PAIN DESCRIPTION - FREQUENCY: FREQUENCY: CONSTANT/CONTINUOUS

## 2024-06-20 ASSESSMENT — VISUAL ACUITY: OU: 1

## 2024-06-20 ASSESSMENT — PAIN DESCRIPTION - PAIN TYPE: TYPE: ACUTE PAIN

## 2024-06-20 ASSESSMENT — PAIN - FUNCTIONAL ASSESSMENT: PAIN_FUNCTIONAL_ASSESSMENT: 0-10

## 2024-06-20 ASSESSMENT — PAIN DESCRIPTION - PROGRESSION: CLINICAL_PROGRESSION: NOT CHANGED

## 2024-06-20 NOTE — PROGRESS NOTES
Pharmacy Medication History Review    Josh Kessler is a 74 y.o. male admitted for No Principal Problem: There is no principal problem currently on the Problem List. Please update the Problem List and refresh.. Pharmacy reviewed the patient's [id number] medications and allergies for accuracy.    The list below reflectives the updated PTA list. Please review each medication in order reconciliation for additional clarification and justification.  Prior to Admission medications    Medication Sig Start Date End Date Taking? Authorizing Provider   aspirin 81 mg EC tablet Take 1 tablet (81 mg) by mouth once daily.   Yes Historical Provider, MD   atorvastatin (Lipitor) 40 mg tablet Take 1 tablet (40 mg) by mouth once daily at bedtime. 3/14/24  Yes Dewey Cruz, DO        The list below reflectives the updated allergy list. Please review each documented allergy for additional clarification and justification.  Allergies  Reviewed by Josh Carlson RN on 6/20/2024   No Known Allergies         Below are additional concerns with the patient's PTA list.      Lorna Bajwa

## 2024-06-20 NOTE — ED PROVIDER NOTES
HPI   Chief Complaint   Patient presents with    Fall    Hip Pain     Patient arrives from home with complaints of right hip pain after having fallen twice today.  Patient has a history of cerebral palsy and has significant deficits to his right leg at baseline.  The first time was outside while taking out the garbage and then again once he got into his house.  Patient denies LOC or hitting his head and is not on blood thinners.       Patient is a 74-year-old male with past medical history of CP with chronic right sided weakness who presents ED today after mechanical fall.  Patient states he was walking in his backyard using his walking sticks and his right leg went out from under him and he fell.  Since that time he has been having some pain radiating into his right hip.  Patient states that he was attempting to ambulate at home again and had to sit down due to the pain and called EMS.  Patient denies striking his head, any loss of consciousness.  Patient denies taking any blood thinners.  Patient does state that he sustained an abrasion to his right elbow but does not have any pain in the elbow.  Patient does not recall when his last tetanus shot was.      History provided by:  Patient   used: No                        [place] Coma Scale Score: 15                     Patient History   Past Medical History:   Diagnosis Date    Cerebral palsy (Multi)     Other specified health status     No pertinent past medical history     Past Surgical History:   Procedure Laterality Date    OTHER SURGICAL HISTORY  04/24/2018    History Of Prior Surgery     No family history on file.  Social History     Tobacco Use    Smoking status: Never    Smokeless tobacco: Never   Vaping Use    Vaping status: Never Used   Substance Use Topics    Alcohol use: Never    Drug use: Never       Physical Exam   ED Triage Vitals [06/20/24 1439]   Temperature Heart Rate Respirations BP   36.3 °C (97.3 °F) 84 18 (!) 146/96       Pulse Ox Temp Source Heart Rate Source Patient Position   94 % Temporal -- Sitting      BP Location FiO2 (%)     Right arm --       Physical Exam  Vitals and nursing note reviewed.   Constitutional:       General: He is not in acute distress.     Appearance: Normal appearance. He is not toxic-appearing or diaphoretic.      Interventions: Cervical collar in place.   HENT:      Head: Normocephalic and atraumatic.      Comments: No palpable hematoma, calvarium nontender to palpation, no tenderness palpation of the face, no midface instability, no malocclusion, no fractured teeth, no intraoral bleeding or trauma     Right Ear: Tympanic membrane normal. No hemotympanum.      Left Ear: Tympanic membrane normal. No hemotympanum.      Nose: Nose normal. No nasal tenderness.      Right Nostril: No epistaxis or septal hematoma.      Left Nostril: No epistaxis or septal hematoma.      Comments: No bleeding from bilateral naris, no tenderness palpation     Mouth/Throat:      Mouth: Mucous membranes are moist. No injury.      Pharynx: Oropharynx is clear. No oropharyngeal exudate or posterior oropharyngeal erythema.   Eyes:      General: Lids are normal. Vision grossly intact.      Extraocular Movements: Extraocular movements intact.      Right eye: Normal extraocular motion.      Left eye: Normal extraocular motion.      Conjunctiva/sclera: Conjunctivae normal.      Right eye: No hemorrhage.     Left eye: No hemorrhage.     Pupils: Pupils are equal, round, and reactive to light.   Cardiovascular:      Rate and Rhythm: Normal rate and regular rhythm.      Pulses:           Radial pulses are 2+ on the right side and 2+ on the left side.        Femoral pulses are 2+ on the right side and 2+ on the left side.       Dorsalis pedis pulses are 2+ on the right side and 2+ on the left side.        Posterior tibial pulses are 2+ on the right side and 2+ on the left side.   Pulmonary:      Effort: No respiratory distress.      Breath  sounds: Normal breath sounds.   Abdominal:      General: Abdomen is flat.      Tenderness: There is no abdominal tenderness. There is no guarding or rebound.   Musculoskeletal:         General: No swelling, tenderness, deformity or signs of injury.      Right shoulder: Normal. No deformity or tenderness.      Left shoulder: Normal. No deformity or tenderness.      Right upper arm: Normal. No deformity or tenderness.      Left upper arm: Normal. No deformity or tenderness.      Right elbow: Normal. No deformity. No tenderness.      Left elbow: Normal. No deformity. No tenderness.      Right forearm: Normal. No deformity or tenderness.      Left forearm: Normal. No deformity or tenderness.      Right wrist: Normal. No deformity or tenderness.      Left wrist: Normal. No deformity or tenderness.      Right hand: Normal. No deformity or tenderness.      Left hand: Normal. No deformity or tenderness.      Cervical back: No deformity, tenderness or bony tenderness. No spinous process tenderness.      Thoracic back: No deformity or bony tenderness. Scoliosis present.      Lumbar back: No deformity or bony tenderness. Scoliosis present.      Right hip: No deformity or tenderness.      Left hip: No deformity or tenderness.      Right upper leg: No deformity or tenderness.      Left upper leg: No deformity or tenderness.      Right knee: No deformity. No tenderness.      Left knee: No deformity. No tenderness.      Right lower leg: No deformity or tenderness.      Left lower leg: No deformity or tenderness.      Right ankle: No deformity. No tenderness.      Left ankle: No deformity. No tenderness.      Right foot: No deformity or tenderness.      Left foot: No deformity or tenderness.      Comments: No C,T or L-spine tenderness, no step-offs or deformities.  Right elbow abrasion without bony tenderness no tenderness to palpation abrasion or evidence of injury bilateral upper or lower extremities.  Patient also states pain in  his right hip/thigh, no tenderness to palpation or bony deformity.  High-sensitivity neuro exam:  Neck pain:   C1-2-3-4, sensation back of head and neck, normal  C5 deltoid motor, normal  C6 biceps motor, normal  B3Gudrw/fingers, normal  C8 flex fingers, normal  T1 move fingers apart, normal  Thoracic back pain:  T1 move fingers apart, normal  T2-T12 trunk sensation, normal  Lumbar pain:  L2 adduct thigh, normal on left, decreased on right  L3 extend knee, normal  L4 dorsiflex ankle, normal  L5 point great toe, normal  L2-L3-L4 knee reflex, normal  S1 flex knee, normal  S2 plantarflex toes, normal  S3-S4-S5 groin, perianal sensation, normal per patient   Skin:     General: Skin is warm.      Capillary Refill: Capillary refill takes less than 2 seconds.      Findings: No abrasion, bruising, laceration or lesion.   Neurological:      General: No focal deficit present.      Mental Status: He is alert and oriented to person, place, and time.      GCS: GCS eye subscore is 4. GCS verbal subscore is 5. GCS motor subscore is 6.      Sensory: Sensation is intact.      Comments: GCS 15   Psychiatric:         Mood and Affect: Mood normal.         Behavior: Behavior normal.         ED Course & MDM   ED Course as of 06/20/24 1646   Thu Jun 20, 2024   1546 XR hip right with pelvis when performed 2 or 3 views  No acute findings. [WS]   1547 CT lumbar spine wo IV contrast  Lumbar spine: Grade 1 anterolisthesis L5-S1 with pars defects and  severe degenerative changes. Marked degenerative changes also noted  L4-L5. No evidence of acute lumbar spine fracture.   [WS]   1547 CT thoracic spine wo IV contrast  Thoracic spine: Scattered degenerative changes in the thoracic spine  with slight wedging. All of these findings of felt to be chronic.   [WS]   1547 CT cervical spine wo IV contrast  CT CERVICAL SPINE:  No acute fracture or traumatic malalignment of the cervical spine.  Mild multilevel degenerative disc disease.   [WS]   1547 CT  head wo IV contrast  CT HEAD:  No acute intracranial abnormality or calvarial fracture.  Senescent change   [WS]   1644 CBC and Auto Differential(!)  No anemia, no thrombocytopenia, moderate leukocytosis, likely reactive. [WS]      ED Course User Index  [WS] Da Jenkins, APRN-CNP         Diagnoses as of 06/20/24 1646   Fall, initial encounter   Sciatica, right side   Difficulty walking       Medical Decision Making  Differential diagnosis: Intracranial hemorrhage, cervical injury, thoracic injury, lumbar injury, abrasions, hip fracture.  Patient's vital signs are stable.  Given patient's mechanism of injury we did obtain CT imaging of patient's head and spines, patient does have an abrasion to his right elbow but has no bony tenderness and has full range of motion without discomfort, we did update his tetanus vaccination.  Patient does have some weakness with abduction of the right leg, patient states this is chronic for him.  I did offer patient medication for pain but he declined.  Medical decision making was discussed throughout the ED course, in summary: CT imaging reveals degenerative and chronic changes but no acute traumatic injuries.  Thoracic spine does have wedging of multiple vertebra, according to patient this is a known problem for him.  Lumbar spine also has anterolisthesis, he states this is also known.  We will attempt to ambulate the patient and if he is able to ambulate will discharge home, if he is unable to ambulate after these falls we will admit him for physical therapy evaluation and possible placement.  We attempted to ambulate the patient and he was unable to ambulate, will obtain basic labs and admitted to the hospital for declining ambulation after fall.  Hospitalist accepted the patient to the floor.        Amount and/or Complexity of Data Reviewed  Radiology: ordered and independent interpretation performed. Decision-making details documented in ED Course.    Risk  OTC  drugs.  Decision regarding hospitalization.        Procedure  Procedures         Da Jenkins, ISIAH-CNP  06/20/24 0703

## 2024-06-20 NOTE — H&P
History Of Present Illness  Josh Kessler is a 74 y.o. male with a past medical history of CVA (January 2024), cerebral palsy, CAD who presents to [facility] ED after a fall.  Patient reports that he has a history of recurrent falls due to his cerebral palsy however this time he fell on his driveway and  had trouble weightbearing afterwards.  He reports he scratched his elbow however did not lose consciousness or hit his head.  He reports his right leg became weak before he fell.  Denies any fevers, chills, abdominal pain, chest pain, shortness of breath or any other complaints.  In the ED, WBC 18.5, otherwise unremarkable, BMP unremarkable.  CT lumbar/thoracic/cervical/head shows no acute normality.  Right hip x-ray shows no acute findings.     Past Medical History  As above    Surgical History  He has a past surgical history that includes Other surgical history (04/24/2018).     Social History  Denies tobacco, alcohol or drug use    Family History  No family history on file.     Allergies  Patient has no known allergies.    Review of Systems  10 point review of systems performed and negative except as stated above    Physical Exam  General:  Pleasant and cooperative. No apparent distress.  HEENT:  Normocephalic, atraumatic, mucus membranes moist.   Neck:  Trachea midline.  No JVD.    Chest:  Clear to auscultation bilaterally. No wheezes, rales, or rhonchi.  CV:  Regular rate and rhythm.  Positive S1/S2.   Abdomen: Bowel sounds present in all four quadrants, abdomen is soft, non-tender, non-distended.  Extremities:  No lower extremity edema or cyanosis.  Right lateral thigh tenderness.  Neurological:  AAOx3. No focal deficits.  Skin:  Warm and dry.    Last Recorded Vitals  /73   Pulse 78   Temp 36.3 °C (97.3 °F) (Temporal)   Resp 18   Wt 54.4 kg (120 lb)   SpO2 96%        Assessment/Plan   Principal Problem:    Fall at home, initial encounter  Active Problems:    Generalized weakness    #Fall  #Generalized  weakness  #Leukocytosis  -Will order CT femur right since initial imaging did not fully visualize the femur.  Suspect leukocytosis is reactive however will continue to monitor.  -PT/OT consulted  -Pain control as needed  -Admitted for observation    Chronic:  CAD  Cerebral palsy  -Continue home meds    DVT prophylaxis: Lovenox  CODE STATUS: DNR/DNI    Woody Del Toro DO

## 2024-06-21 LAB
BASOPHILS # BLD AUTO: 0.03 X10*3/UL (ref 0–0.1)
BASOPHILS NFR BLD AUTO: 0.3 %
EOSINOPHIL # BLD AUTO: 0.44 X10*3/UL (ref 0–0.4)
EOSINOPHIL NFR BLD AUTO: 4 %
ERYTHROCYTE [DISTWIDTH] IN BLOOD BY AUTOMATED COUNT: 12.2 % (ref 11.5–14.5)
HCT VFR BLD AUTO: 45.7 % (ref 41–52)
HGB BLD-MCNC: 15.7 G/DL (ref 13.5–17.5)
HOLD SPECIMEN: NORMAL
IMM GRANULOCYTES # BLD AUTO: 0.03 X10*3/UL (ref 0–0.5)
IMM GRANULOCYTES NFR BLD AUTO: 0.3 % (ref 0–0.9)
LYMPHOCYTES # BLD AUTO: 1.9 X10*3/UL (ref 0.8–3)
LYMPHOCYTES NFR BLD AUTO: 17.5 %
MCH RBC QN AUTO: 33.9 PG (ref 26–34)
MCHC RBC AUTO-ENTMCNC: 34.4 G/DL (ref 32–36)
MCV RBC AUTO: 99 FL (ref 80–100)
MONOCYTES # BLD AUTO: 1.07 X10*3/UL (ref 0.05–0.8)
MONOCYTES NFR BLD AUTO: 9.8 %
NEUTROPHILS # BLD AUTO: 7.41 X10*3/UL (ref 1.6–5.5)
NEUTROPHILS NFR BLD AUTO: 68.1 %
NRBC BLD-RTO: 0 /100 WBCS (ref 0–0)
PLATELET # BLD AUTO: 173 X10*3/UL (ref 150–450)
RBC # BLD AUTO: 4.63 X10*6/UL (ref 4.5–5.9)
WBC # BLD AUTO: 10.9 X10*3/UL (ref 4.4–11.3)

## 2024-06-21 PROCEDURE — 85025 COMPLETE CBC W/AUTO DIFF WBC: CPT

## 2024-06-21 PROCEDURE — 36415 COLL VENOUS BLD VENIPUNCTURE: CPT

## 2024-06-21 PROCEDURE — 97161 PT EVAL LOW COMPLEX 20 MIN: CPT | Mod: GP

## 2024-06-21 PROCEDURE — 96372 THER/PROPH/DIAG INJ SC/IM: CPT

## 2024-06-21 PROCEDURE — 2500000001 HC RX 250 WO HCPCS SELF ADMINISTERED DRUGS (ALT 637 FOR MEDICARE OP)

## 2024-06-21 PROCEDURE — 2500000004 HC RX 250 GENERAL PHARMACY W/ HCPCS (ALT 636 FOR OP/ED)

## 2024-06-21 PROCEDURE — G0378 HOSPITAL OBSERVATION PER HR: HCPCS

## 2024-06-21 PROCEDURE — 97165 OT EVAL LOW COMPLEX 30 MIN: CPT | Mod: GO

## 2024-06-21 PROCEDURE — 99232 SBSQ HOSP IP/OBS MODERATE 35: CPT | Performed by: INTERNAL MEDICINE

## 2024-06-21 ASSESSMENT — COGNITIVE AND FUNCTIONAL STATUS - GENERAL
STANDING UP FROM CHAIR USING ARMS: A LOT
TURNING FROM BACK TO SIDE WHILE IN FLAT BAD: A LITTLE
DRESSING REGULAR LOWER BODY CLOTHING: A LITTLE
CLIMB 3 TO 5 STEPS WITH RAILING: TOTAL
MOVING TO AND FROM BED TO CHAIR: A LOT
PERSONAL GROOMING: A LITTLE
STANDING UP FROM CHAIR USING ARMS: A LOT
HELP NEEDED FOR BATHING: A LOT
MOVING TO AND FROM BED TO CHAIR: A LOT
MOVING TO AND FROM BED TO CHAIR: A LOT
CLIMB 3 TO 5 STEPS WITH RAILING: TOTAL
MOVING FROM LYING ON BACK TO SITTING ON SIDE OF FLAT BED WITH BEDRAILS: A LITTLE
HELP NEEDED FOR BATHING: A LITTLE
STANDING UP FROM CHAIR USING ARMS: A LOT
WALKING IN HOSPITAL ROOM: A LOT
MOBILITY SCORE: 14
DRESSING REGULAR LOWER BODY CLOTHING: A LITTLE
WALKING IN HOSPITAL ROOM: A LOT
MOBILITY SCORE: 13
TURNING FROM BACK TO SIDE WHILE IN FLAT BAD: A LITTLE
TOILETING: A LOT
DRESSING REGULAR LOWER BODY CLOTHING: A LOT
DAILY ACTIVITIY SCORE: 22
DAILY ACTIVITIY SCORE: 16
DAILY ACTIVITIY SCORE: 23
DRESSING REGULAR UPPER BODY CLOTHING: A LITTLE
WALKING IN HOSPITAL ROOM: A LOT
CLIMB 3 TO 5 STEPS WITH RAILING: TOTAL

## 2024-06-21 ASSESSMENT — PAIN - FUNCTIONAL ASSESSMENT: PAIN_FUNCTIONAL_ASSESSMENT: 0-10

## 2024-06-21 ASSESSMENT — PAIN SCALES - GENERAL
PAINLEVEL_OUTOF10: 0 - NO PAIN
PAINLEVEL_OUTOF10: 4
PAINLEVEL_OUTOF10: 0 - NO PAIN

## 2024-06-21 NOTE — PROGRESS NOTES
Josh Kessler is a 74 y.o. male on day 0 of admission presenting with Fall at home, initial encounter.      Subjective   Patient still reports pain today however feels better than yesterday.  Denies any other complaints and is able to stand on his leg today.       Objective     Last Recorded Vitals  /65 (BP Location: Right arm, Patient Position: Lying)   Pulse 74   Temp 36.3 °C (97.3 °F) (Temporal)   Resp 18   Wt 54.4 kg (120 lb)   SpO2 95%   Intake/Output last 3 Shifts:    Intake/Output Summary (Last 24 hours) at 6/21/2024 1426  Last data filed at 6/21/2024 0604  Gross per 24 hour   Intake --   Output 250 ml   Net -250 ml       Admission Weight  Weight: 54.4 kg (120 lb) (06/20/24 1439)    Daily Weight  06/20/24 : 54.4 kg (120 lb)    Image Results  CT femur right wo IV contrast  Narrative: Interpreted By:  Jacobo Haywood,   STUDY:  CT FEMUR RIGHT WO IV CONTRAST;  6/20/2024 6:44 pm      INDICATION:  Signs/Symptoms:Pain, non weight bearing.      COMPARISON:  None.      ACCESSION NUMBER(S):  [id number]      ORDERING CLINICIAN:  TRACY SANFORD      TECHNIQUE:  Contiguous axial images of the right femur were obtained without  intravenous contrast. Coronal and sagittal reformatted images were  obtained from the axial images.      FINDINGS:  No evidence acute fracture dislocation of the right hip. No evidence  of acute displaced right femur fracture. There is right knee  osteoarthrosis with narrowing of the lateral compartment and marginal  osseous spurring. There is patellofemoral osteoarthrosis with joint  space narrowing and osseous spurring. No evidence of significant knee  effusion. Vascular calcifications.      Impression: No evidence of acute displaced right femur fracture.      MACRO:  None      Signed by: Jacobo Haywood 6/20/2024 7:43 PM  Dictation workstation:   [id number]  CT cervical spine wo IV contrast, CT head wo IV contrast, CT thoracic spine wo IV contrast, CT lumbar spine wo IV  contrast  Narrative: Interpreted By:  Kermit Chakraborty,   STUDY:  CT HEAD WO IV CONTRAST; CT CERVICAL SPINE WO IV CONTRAST; CT LUMBAR  SPINE WO IV CONTRAST; CT THORACIC SPINE WO IV CONTRAST;  6/20/2024  3:28 pm      INDICATION:  Signs/Symptoms:Fall over 65; Signs/Symptoms:Fall with back pain and  pain radiating into right leg.      COMPARISON:  None.      ACCESSION NUMBER(S):  [id number]; [id number]; [id number]; [id number]      ORDERING CLINICIAN:  ZEB STEWART      TECHNIQUE:  Axial noncontrast CT images of head with coronal and sagittal  reconstructed images. Axial noncontrast CT images of the cervical  spine thoracic and lumbar spine with coronal and sagittal  reconstructed images.      FINDINGS:  CT HEAD:      BRAIN PARENCHYMA:  No evidence of acute intraparenchymal hemorrhage  or parenchymal evidence of acute large territory ischemic infarct. No  mass-effect, midline shift or effacement of cerebral sulci.  Gray-white matter distinction is preserved. Mild global volume loss      VENTRICLES and EXTRA-AXIAL SPACES:  No acute extra-axial or  intraventricular hemorrhage. Ventricles and sulci are age-concordant.      PARANASAL SINUSES/MASTOIDS:  No hemorrhage or air-fluid levels within  the visualized paranasal sinuses. The mastoid air cells are  well-aerated.      CALVARIUM/ORBITS:  No skull fracture.  The orbits and globes are  intact to the extent visualized.      EXTRACRANIAL SOFT TISSUES: No discernible abnormality.          CT CERVICAL SPINE:      PREVERTEBRAL SOFT TISSUES: Within normal limits.      CRANIOCERVICAL JUNCTION: Intact.      ALIGNMENT:  No traumatic malalignment or traumatic facet widening.      VERTEBRAE:  No acute fracture. Vertebral body heights are maintained.      Mild-to-moderate multilevel degenerative disc disease most notable in  the lower cervical spine.      Thoracic spine: Mild wedging of the midthoracic spine; age  indeterminate. I can not categorically exclude acute  compression  although this is felt to be more chronic appearing. Scattered  degenerative changes.      Evaluation of the lumbar spine shows severe degenerative disc disease  with pars defect at L5-S1 with grade 1 anterolisthesis. Severe  degenerative disc disease also noted L4-L5. These findings appear to  be chronic. No evidence of acute fracture of the lumbar spine.      Impression: CT HEAD:  No acute intracranial abnormality or calvarial fracture.  Senescent change      CT CERVICAL SPINE:  No acute fracture or traumatic malalignment of the cervical spine.  Mild multilevel degenerative disc disease.      Lumbar spine: Grade 1 anterolisthesis L5-S1 with pars defects and  severe degenerative changes. Marked degenerative changes also noted  L4-L5. No evidence of acute lumbar spine fracture.      Thoracic spine: Scattered degenerative changes in the thoracic spine  with slight wedging. All of these findings of felt to be chronic. If  there is ongoing focal mid back pain, thoracic spine MRI could add  additional diagnostic information      Signed by: Kermit Chakraborty 6/20/2024 3:42 PM  Dictation workstation:   [id number]  XR hip right with pelvis when performed 2 or 3 views  Narrative: Interpreted By:  Gallo Arcos,   STUDY:  XR HIP RIGHT WITH PELVIS WHEN PERFORMED 2 OR 3 VIEWS;  6/20/2024 2:59  pm      INDICATION:  Signs/Symptoms:Fall with right hip pain..      COMPARISON:  None.      ACCESSION NUMBER(S):  [id number]      ORDERING CLINICIAN:  ZEB STEWART      FINDINGS:  Bony structures:  Intact      Joint spaces:  Maintained      Soft tissues:  Unremarkable without significant edema or radiodense  foreign body      Other:  Spondylosis of the included lumbar spine particularly at the  L5-S1 level.      Impression: No acute findings.      MACRO:  None      Signed by: Gallo Arcos 6/20/2024 3:37 PM  Dictation workstation:   [id number]      Physical Exam  General:  Pleasant and cooperative. No apparent  distress.  HEENT:  Normocephalic, atraumatic, mucus membranes moist.   Neck:  Trachea midline.  No JVD.    Chest:  Clear to auscultation bilaterally. No wheezes, rales, or rhonchi.  CV:  Regular rate and rhythm.  Positive S1/S2.   Abdomen: Bowel sounds present in all four quadrants, abdomen is soft, non-tender, non-distended.  Extremities:  No lower extremity edema or cyanosis.   Neurological:  AAOx3. No focal deficits.  Skin:  Warm and dry.      Assessment/Plan   Principal Problem:    Fall at home, initial encounter  Active Problems:    Generalized weakness    #Fall  #Generalized weakness  #Leukocytosis, resolved  -CT did not show acute fracture or abnormality.  Patient reports improvement in pain and is able to bear weight on his leg today.  Continue to monitor overnight.  -PT/OT consulted  -Pain control as needed  -Admitted for observation     Chronic:  CAD  Cerebral palsy  -Continue home meds     DVT prophylaxis: Lovenox  CODE STATUS: DNR/DNI    Woody Del Toro DO

## 2024-06-21 NOTE — PROGRESS NOTES
Physical Therapy    Physical Therapy Evaluation    Patient Name: Josh Kessler  MRN: [id number]  Today's Date: 6/21/2024   Time Calculation  Start Time: 0908  Stop Time: 0927  Time Calculation (min): 19 min  320/320-A    Assessment/Plan   PT Assessment  PT Assessment Results: Decreased strength, Decreased endurance, Impaired balance, Decreased mobility, Pain  Rehab Prognosis: Good  Evaluation/Treatment Tolerance: Patient limited by pain  Medical Staff Made Aware: Yes  Assessment Comment: Pt presents /c above impairments and decline from functional baseline s/p recent falls and /c RLE pain. Pt will benefit from continued PT services at mod intensity to address above and maximize functional mobility.  End of Session Patient Position: Bed, 2 rail up, Alarm off, not on at start of session  IP OR SWING BED PT PLAN  Inpatient or Swing Bed: Inpatient  PT Plan  Treatment/Interventions: Bed mobility, Transfer training, Gait training, Balance training, Neuromuscular re-education, Strengthening, Endurance training, Therapeutic exercise, Therapeutic activity  PT Plan: Ongoing PT  PT Frequency: 4 times per week  PT Discharge Recommendations: Moderate intensity level of continued care  PT - OK to Discharge: Yes    Subjective     Current Problem:  1. Fall, initial encounter        2. Sciatica, right side        3. Difficulty walking          Patient Active Problem List   Diagnosis    Acute pharyngitis    Basal cell adenoma    Biceps tendinitis of both shoulders    Bilateral hearing loss    Borderline hyperlipidemia    Cerebral palsy (Multi)    Closed fracture of nasal bones    Deviated nasal septum    History of Mohs micrographic surgery for skin cancer    Low back pain    Neck pain    Osteoarthritis of finger of right hand    Primary osteoarthritis of right knee    Wound, open, forehead    Protein-calorie malnutrition, unspecified severity (Multi)    Benign prostatic hyperplasia    Fall at home, initial encounter    Generalized  weakness       General Visit Information:  General  Reason for Referral: PT eval and treat  Referred By: Alverto  Past Medical History Relevant to Rehab: CAD, CP, CVA 1/2024  Co-Treatment: OT  Co-Treatment Reason: possible two person assist, maximize functional mobility  Prior to Session Communication: Bedside nurse  Patient Position Received: Bed, 2 rail up, Alarm off, not on at start of session  General Comment: Pt presents with fall x2 and R hip pain, inability to WB/ambulate. Head CT (-), R hip xray (-) acute, CT C, T, L spine: grade I anterolisthesis L5-S1 /c pars defects, already known per pt/chart. Pt cleared for therapy by nursing. Pt supine, agreeable.    Home Living:  Home Living  Home Living Comments: Pt lives alone, single story home, 4STE /c rail.    Prior Level of Function:  Prior Function Per Pt/Caregiver Report  Prior Function Comments: Pt has access to w/c, ww and was using a Rollator at baseline. Indep /c ADL/IADLs, drives. Denies other falls, has family support prn.    Precautions:  Precautions  Precautions Comment: falls, R hip pain, CP       Objective     Pain:  Pain Assessment  Pain Assessment: 0-10  0-10 (Numeric) Pain Score:  (pt denies pain at rest, notes RLE discomfort /c mobility, but does not rate. Pt states he's able to move it better than yesterday. Denies need for pain med.)    Cognition:  Cognition  Overall Cognitive Status: Within Functional Limits    General Assessments:  General Observation  General Observation: activity orders: OOB /c A  skin integrity: WFL   Activity Tolerance  Endurance: Endurance does not limit participation in activity  Sensation  Sensation Comment: denies n/t  Strength  Strength Comments: LLE 3+/5, RLE 2+/5 throughout, chronic per pt           Dynamic Sitting Balance  Dynamic Sitting-Comments: F  Dynamic Standing Balance  Dynamic Standing-Comments: F-    Functional Assessments:     Bed Mobility  Bed Mobility: Yes  Bed Mobility 1  Bed Mobility Comments 1:  Supine<>Sit /c minAx1, RLE support 2nd pain.  Transfers  Transfer: Yes  Transfer 1  Trials/Comments 1: Sit<>Stand /c modAx1, BUE support, shoes donned /c assist.  Ambulation/Gait Training  Ambulation/Gait Training Performed:  (Pt ambulates ~5' forward/retrostepping /c WW/gait belt, min-modAx1 d/t instability. Slow noel, discontinuous steps and poor foot clearance RLE. LLE buckling noted /c retrostepping, at least modA to recover. High fall risk.)         Outcome Measures:     [facility] Basic Mobility  Turning from your back to your side while in a flat bed without using bedrails: A little  Moving from lying on your back to sitting on the side of a flat bed without using bedrails: A little  Moving to and from bed to chair (including a wheelchair): A lot  Standing up from a chair using your arms (e.g. wheelchair or bedside chair): A lot  To walk in hospital room: A lot  Climbing 3-5 steps with railing: Total  Basic Mobility - Total Score: 13           Goals:  Encounter Problems       Encounter Problems (Active)       PT Problem       STG - Pt will transition supine <> sitting with mod I (Progressing)       Start:  06/21/24    Expected End:  07/05/24            STG - Pt will transfer STS with mod I (Progressing)       Start:  06/21/24    Expected End:  07/05/24            STG - Pt will amb >/=50' using WW with SBA (Progressing)       Start:  06/21/24    Expected End:  07/05/24            STG - Pt will maintain F+ dynamic standing balance to decrease risk of falls (Progressing)       Start:  06/21/24    Expected End:  07/05/24                       Education Documentation  Mobility Training, taught by Toshia Mendiola PT at 6/21/2024 11:24 AM.  Learner: Patient  Readiness: Acceptance  Method: Explanation  Response: Verbalizes Understanding, Needs Reinforcement    Education Comments  No comments found.

## 2024-06-21 NOTE — CARE PLAN
The patient's goals for the shift include      The clinical goals for the shift include pt will remain injury free    Over the shift, the patient did not make progress toward the following goals. Barriers to progression include calling for assistance. Recommendations to address these barriers include frequent rounds.

## 2024-06-21 NOTE — CARE PLAN
The patient's goals for the shift include  comfort  Problem: HP General Problem  Goal: HP General Goal  6/20/2024 2347 by Tomás Roque RN  Outcome: Progressing       The clinical goals for the shift include safety        Problem: HP General Problem  Goal: HP General Goal  6/20/2024 2347 by Tomás Roque RN  Outcome: Progressing  6/20/2024 2347 by Tomás Roque RN  Outcome: Not Progressing

## 2024-06-21 NOTE — CARE PLAN
The patient's goals for the shift include  comfort    The clinical goals for the shift include safety        Problem: HP General Problem  Goal: HP General Goal  Outcome: Not Progressing

## 2024-06-21 NOTE — PROGRESS NOTES
24 1547   Discharge Planning   Living Arrangements Alone   Support Systems Friends/neighbors   Assistance Needed Pt states was previously Independent with walker   Type of Residence Private residence   Number of Stairs to Enter Residence 4   Home or Post Acute Services None   Patient expects to be discharged to: SNF; Has list   Does the patient need discharge transport arranged? Yes     TCC Note: Met with pt at bedside, introduced self and explained role on Transition of Care team. Verified name, , demographics, insurance, PCP is Dewey Cruz. ER contact is cousin Ana Barajas, phone: [phone]. Pt states was previously Independent and getting Home Care. Pt fell twice yesterday (). Pt uses walker. Pharmacy is [facility] on [address]. Discussed Therapy [facility] scores of PT-13 and OT-16 with recommendation of Mod. Provided SNF list per [facility] Directory which, includes facilities that are within  Post- Acute Quality network as well as meeting patient's medical needs and are in-network for patient's insurance while also in discharge geographic are patient/family prefers. List identifies each facilities CMS star rating. Patient chose in order of preference: [facility], [facility] and lastly, [facility]. Will follow for acceptance. Karishma Smith, MSN, RN, TCC.

## 2024-06-21 NOTE — CARE PLAN
The patient's goals for the shift include  comfort     The clinical goals for the shift include  safety

## 2024-06-21 NOTE — PROGRESS NOTES
Occupational Therapy    Evaluation    Patient Name: Josh Kessler  MRN: [id number]  Today's Date: 6/21/2024  Time Calculation  Start Time: 0909  Stop Time: 0926  Time Calculation (min): 17 min        Assessment:  End of Session Communication: Bedside nurse    OT Assessment Results: Decreased ADL status, Decreased upper extremity strength, Decreased safe judgment during ADL, Decreased endurance, Decreased functional mobility  Plan:  Treatment Interventions: ADL retraining, Functional transfer training, UE strengthening/ROM, Endurance training, Equipment evaluation/education, Patient/family training, Compensatory technique education  OT Frequency: 3 times per week  OT Discharge Recommendations: Moderate intensity level of continued care  OT - OK to Discharge: Yes (once medically appropriate to next level of care)  Treatment Interventions: ADL retraining, Functional transfer training, UE strengthening/ROM, Endurance training, Equipment evaluation/education, Patient/family training, Compensatory technique education    Subjective   Current Problem:  1. Fall, initial encounter        2. Sciatica, right side        3. Difficulty walking          General:  General  Reason for Referral: OT eval and tx; ADLs. dx; #Fall  #Generalized weakness  #Leukocytosis. Head CT: negative. R hip xray: negative. CT, C,T, L spine: grade I anterolisthesis L5-S1 with pars defect (known per patient).  Referred By: Nabor  Past Medical History Relevant to Rehab: 74 y.o. male with a past medical history of CVA (January 2024), cerebral palsy, CAD who presents to [place] ED after a fall.  Patient reports that he has a history of recurrent falls due to his cerebral palsy however this time he fell on his driveway and  had trouble weightbearing afterwards.  He reports he scratched his elbow however did not lose consciousness or hit his head.  He reports his right leg became weak before he fell.  Denies any fevers, chills, abdominal pain, chest pain,  shortness of breath or any other complaints.  Co-Treatment: PT  Co-Treatment Reason: for safety and maximize functional performance  Prior to Session Communication: Bedside nurse  Patient Position Received:  (patient lying in bed at start and end of eval, 2 rails up, call light in reach, HOB elevated to comfort, all needs met. alarm off as off upon arrival.)  General Comment: patient pleasant and cooperative to participate  Precautions:  Precautions Comment: falls, R hip pain, CP, OOB with assist       Pain:  Pain Assessment  Pain Assessment:  (patient initially reports no pain but discomfort with RLE with movement. did not rate. reports better than yesterday and denies need for pain meds)    Objective   Cognition:  Overall Cognitive Status: Within Functional Limits           Home Living:  Type of Home:  (per patient report upon eval, lives alone. 4 LAZARO house with HR. 1st floor set up. basement, goes down there PRN, HR on stairs.)  Bathroom Shower/Tub:  (walk in shower with shower chair, grab bar, handheld shower)  Bathroom Toilet:  (raised toilet with grab bar)  Prior Function:  Level of [place]:  (independent in ADLs/IADLs PLOF. use of rollator PLOF. also owns WW and w/c. patient drives. reports has family local)    ADL:  LE Dressing Assistance:  (MOD A to zayda tennis shoes seated EOB)    Bed Mobility/Transfers: Bed Mobility  Bed Mobility:  (completed supine <-->sit with MIN A)    Transfers  Transfer:  (completed STS with MOD A x 1 with WW)      Functional Mobility:  Functional Mobility  Functional Mobility Performed:  (patient engaged in simple mobility, completed side steps and forward/backward steps with MIN-MOD A x 1 with WW)     Sensation:  Sensation Comment: denies numbness/tingling  Strength:  Strength Comments: BUE ROM/MMT WFL for patient age      Outcome Measures:[facility] Daily Activity  Putting on and taking off regular lower body clothing: A lot  Bathing (including washing, rinsing, drying): A  lot  Putting on and taking off regular upper body clothing: A little  Toileting, which includes using toilet, bedpan or urinal: A lot  Taking care of personal grooming such as brushing teeth: A little  Eating Meals: None  Daily Activity - Total Score: 16        Education Documentation  Body Mechanics, taught by Louise Bellamy OT at 6/21/2024 11:10 AM.  Learner: Patient  Readiness: Acceptance  Method: Explanation  Response: Verbalizes Understanding, Needs Reinforcement    ADL Training, taught by Louise Bellamy OT at 6/21/2024 11:10 AM.  Learner: Patient  Readiness: Acceptance  Method: Explanation  Response: Verbalizes Understanding, Needs Reinforcement    Education Comments  No comments found.        OP EDUCATION:       Goals:  Encounter Problems       Encounter Problems (Active)       OT Goals       STG- patient will complete LB dressing at MOD I with use of ae/ad/dme prn (Progressing)       Start:  06/21/24    Expected End:  07/05/24            STG- patient will complete toileting at MOD I with use of ae/ad/dme prn (Progressing)       Start:  06/21/24    Expected End:  07/05/24            STG- patient will complete transfers to/from bed, chair, commode at MOD I with use of ae/ad/dme prn (Progressing)       Start:  06/21/24    Expected End:  07/05/24            STG- patient will complete simple mobility at MOD I with use of ae/ad/dme prn (Progressing)       Start:  06/21/24    Expected End:  07/05/24            STG- patient will complete grooming at MOD I with use of ae/ad/dme prn (Progressing)       Start:  06/21/24    Expected End:  07/05/24
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
## 2024-06-22 PROCEDURE — 2500000001 HC RX 250 WO HCPCS SELF ADMINISTERED DRUGS (ALT 637 FOR MEDICARE OP)

## 2024-06-22 PROCEDURE — 2500000004 HC RX 250 GENERAL PHARMACY W/ HCPCS (ALT 636 FOR OP/ED)

## 2024-06-22 PROCEDURE — 99232 SBSQ HOSP IP/OBS MODERATE 35: CPT | Performed by: INTERNAL MEDICINE

## 2024-06-22 PROCEDURE — G0378 HOSPITAL OBSERVATION PER HR: HCPCS

## 2024-06-22 PROCEDURE — 96372 THER/PROPH/DIAG INJ SC/IM: CPT

## 2024-06-22 RX ORDER — TRAMADOL HYDROCHLORIDE 50 MG/1
25 TABLET ORAL EVERY 8 HOURS PRN
Status: ACTIVE | OUTPATIENT
Start: 2024-06-22

## 2024-06-22 RX ORDER — ACETAMINOPHEN 325 MG/1
650 TABLET ORAL EVERY 4 HOURS PRN
Qty: 30 TABLET | Refills: 0 | Status: SHIPPED | OUTPATIENT
Start: 2024-06-22

## 2024-06-22 ASSESSMENT — COGNITIVE AND FUNCTIONAL STATUS - GENERAL
STANDING UP FROM CHAIR USING ARMS: A LOT
DAILY ACTIVITIY SCORE: 22
DAILY ACTIVITIY SCORE: 23
CLIMB 3 TO 5 STEPS WITH RAILING: TOTAL
WALKING IN HOSPITAL ROOM: A LOT
MOVING TO AND FROM BED TO CHAIR: A LOT
CLIMB 3 TO 5 STEPS WITH RAILING: TOTAL
MOBILITY SCORE: 15
WALKING IN HOSPITAL ROOM: A LOT
MOVING TO AND FROM BED TO CHAIR: A LOT
DRESSING REGULAR LOWER BODY CLOTHING: A LITTLE
MOBILITY SCORE: 15
STANDING UP FROM CHAIR USING ARMS: A LOT
DRESSING REGULAR LOWER BODY CLOTHING: A LITTLE
HELP NEEDED FOR BATHING: A LITTLE

## 2024-06-22 ASSESSMENT — PAIN SCALES - GENERAL
PAINLEVEL_OUTOF10: 0 - NO PAIN
PAINLEVEL_OUTOF10: 0 - NO PAIN

## 2024-06-22 NOTE — PROGRESS NOTES
06/22/24 1438   Discharge Planning   Living Arrangements Alone   Support Systems Friends/neighbors   Patient expects to be discharged to: [facility]   Does the patient need discharge transport arranged? Yes   RoundTrip coordination needed? Yes     Patient has discharge order and auth to go to [facility].  When I asked for transportation to be arranged, PASSR was submitted.  Patient now requires Level II eval due to cerebral palsy; they have 7-14 business days to review.  Kirsty RANDHAWA TCC

## 2024-06-22 NOTE — CARE PLAN
Problem: HP General Problem  Goal: HP General Goal  Outcome: Progressing   The patient's goals for the shift include  Rest    The clinical goals for the shift include comfort/rest

## 2024-06-22 NOTE — DISCHARGE SUMMARY
Discharge Diagnosis  Fall at home, initial encounter    Issues Requiring Follow-Up    Discharge Meds     Your medication list        START taking these medications        Instructions Last Dose Given Next Dose Due   acetaminophen 325 mg tablet  Commonly known as: Tylenol      Take 2 tablets (650 mg) by mouth every 4 hours if needed for mild pain (1 - 3), headaches or fever (temp greater than 38.0 C).              CONTINUE taking these medications        Instructions Last Dose Given Next Dose Due   aspirin 81 mg EC tablet           atorvastatin 40 mg tablet  Commonly known as: Lipitor      Take 1 tablet (40 mg) by mouth once daily at bedtime.                 Where to Get Your Medications        These medications were sent to [id number] [address] [facility]  [address] [facility] [address]      Phone: [phone]   acetaminophen 325 mg tablet         Test Results Pending At Discharge  Pending Labs       No current pending labs.            Hospital Course  Josh Kessler is a 74 y.o. male with a past medical history of CVA (January 2024), cerebral palsy, CAD who presents to [place] ED after a fall.  Patient reports that he has a history of recurrent falls due to his cerebral palsy however this time he fell on his driveway and  had trouble weightbearing afterwards.  He reports he scratched his elbow however did not lose consciousness or hit his head.  He reports his right leg became weak before he fell.  Denies any fevers, chills, abdominal pain, chest pain, shortness of breath or any other complaints.  In the ED, WBC 18.5, otherwise unremarkable, BMP unremarkable.  CT lumbar/thoracic/cervical/head shows no acute normality.  Right hip x-ray shows no acute findings.  CT right femur negative.   PT/OT during stay.   Tolerating PO intake.   Overall improved pain during his stay,  however low AMPAC and ongoing pain with increased fall risk.  After discussions, patient feels as though a rehab facility  may benefit him.   Hemodynamically stable throughout stay otherwise.     The patient has been advised to maintain regular follow-up appointments with their primary care physician, and they have expressed their willingness to do so.     Discharge time spent 35 minutes.        Pertinent Physical Exam At Time of Discharge    GENERAL:   no distress, alert and cooperative  HEENT: Normal Inspection, Mucous membranes moist, No JVD, No Lymphadenopathy  CARDIOVASCULAR: RRR, no murmurs, 2+ equal pulses of the extremities, normal S1 and S 2  RESPIRATORY: Patent airways, CTAB, normal breath sounds with good chest expansion, thorax symmetric, No Wheezes, Rales or Rhonchi  ABDOMEN: Soft, Non-Tender, Normal Bowel Sounds, No Distention  SKIN: Warm and dry, no lesions, no rashes  EXTREMITIES: normal extremities, no cyanosis edema, contusions or wounds, no clubbing  NEURO: A&O x 3, CN II-XII grossly intact  PSYCH: Appropriate mood and behavior      Outpatient Follow-Up  Future Appointments   Date Time Provider Department Center   7/2/2024 12:00 PM Kermit Meneses MD [id number] [place]   11/12/2024 10:30 AM Dewey Cruz DO [id number] [place]         Alvino Tomlin DO

## 2024-06-22 NOTE — HOSPITAL COURSE
Josh Kessler is a 74 y.o. male with a past medical history of CVA (January 2024), cerebral palsy, CAD who presents to [place] ED after a fall.  Patient reports that he has a history of recurrent falls due to his cerebral palsy however this time he fell on his driveway and  had trouble weightbearing afterwards.  He reports he scratched his elbow however did not lose consciousness or hit his head.  He reports his right leg became weak before he fell.  Denies any fevers, chills, abdominal pain, chest pain, shortness of breath or any other complaints.  In the ED, WBC 18.5, otherwise unremarkable, BMP unremarkable.  CT lumbar/thoracic/cervical/head shows no acute normality.  Right hip x-ray shows no acute findings.  CT right femur negative.   PT/OT during stay.   Tolerating PO intake.   Overall improved pain during his stay,  however low AMPAC and ongoing pain with increased fall risk.  After discussions, patient feels as though a rehab facility may benefit him.   Hemodynamically stable throughout stay otherwise.     The patient has been advised to maintain regular follow-up appointments with their primary care physician, and they have expressed their willingness to do so.     Discharge time spent 35 minutes.

## 2024-06-23 VITALS
SYSTOLIC BLOOD PRESSURE: 129 MMHG | RESPIRATION RATE: 16 BRPM | OXYGEN SATURATION: 95 % | TEMPERATURE: 98.4 F | WEIGHT: 120 LBS | DIASTOLIC BLOOD PRESSURE: 69 MMHG | BODY MASS INDEX: 18.19 KG/M2 | HEART RATE: 76 BPM | HEIGHT: 68 IN

## 2024-06-23 PROCEDURE — 96372 THER/PROPH/DIAG INJ SC/IM: CPT

## 2024-06-23 PROCEDURE — 2500000004 HC RX 250 GENERAL PHARMACY W/ HCPCS (ALT 636 FOR OP/ED)

## 2024-06-23 PROCEDURE — 2500000001 HC RX 250 WO HCPCS SELF ADMINISTERED DRUGS (ALT 637 FOR MEDICARE OP)

## 2024-06-23 PROCEDURE — 99231 SBSQ HOSP IP/OBS SF/LOW 25: CPT | Performed by: INTERNAL MEDICINE

## 2024-06-23 PROCEDURE — G0378 HOSPITAL OBSERVATION PER HR: HCPCS

## 2024-06-23 ASSESSMENT — COGNITIVE AND FUNCTIONAL STATUS - GENERAL
CLIMB 3 TO 5 STEPS WITH RAILING: A LITTLE
MOVING TO AND FROM BED TO CHAIR: A LITTLE
STANDING UP FROM CHAIR USING ARMS: A LITTLE
DRESSING REGULAR UPPER BODY CLOTHING: A LITTLE
PERSONAL GROOMING: A LITTLE
MOVING TO AND FROM BED TO CHAIR: A LITTLE
DAILY ACTIVITIY SCORE: 19
WALKING IN HOSPITAL ROOM: A LITTLE
STANDING UP FROM CHAIR USING ARMS: A LITTLE
DRESSING REGULAR LOWER BODY CLOTHING: A LITTLE
MOBILITY SCORE: 20
CLIMB 3 TO 5 STEPS WITH RAILING: A LITTLE
TOILETING: A LITTLE
DRESSING REGULAR UPPER BODY CLOTHING: A LITTLE
PERSONAL GROOMING: A LITTLE
DAILY ACTIVITIY SCORE: 19
HELP NEEDED FOR BATHING: A LITTLE
WALKING IN HOSPITAL ROOM: A LITTLE
DRESSING REGULAR LOWER BODY CLOTHING: A LITTLE
MOBILITY SCORE: 20
TOILETING: A LITTLE
HELP NEEDED FOR BATHING: A LITTLE

## 2024-06-23 ASSESSMENT — PAIN SCALES - GENERAL
PAINLEVEL_OUTOF10: 0 - NO PAIN
PAINLEVEL_OUTOF10: 0 - NO PAIN

## 2024-06-23 NOTE — PROGRESS NOTES
"Jakob Kessler is a 74 y.o. male on day 0 of admission presenting with Fall at home, initial encounter.        Subjective   Seen this morning, no events overnight.  Patient states that pain is improving overall but still thinks is that he will benefit from going to a skilled facility for ongoing therapies.  Otherwise hemodynamically stable.       Objective     Last Recorded Vitals  /77 (BP Location: Left arm, Patient Position: Lying)   Pulse 75   Temp 36.7 °C (98.1 °F) (Temporal)   Resp 18   Ht 1.727 m (5' 8\")   Wt 54.4 kg (120 lb)   SpO2 93%   BMI 18.25 kg/m²     Intake/Output last 3 Shifts:  I/O last 3 completed shifts:  In: - (0 mL/kg)   Out: 1050 (19.3 mL/kg) [Urine:1050 (0.5 mL/kg/hr)]  Weight: 54.4 kg       =========RELEVANT RESULTS ==========  Labs  Lab Results   Component Value Date    WBC 10.9 06/21/2024    HGB 15.7 06/21/2024    HCT 45.7 06/21/2024    MCV 99 06/21/2024     06/21/2024     Lab Results   Component Value Date    GLUCOSE 93 06/20/2024    CALCIUM 9.4 06/20/2024     06/20/2024    K 4.1 06/20/2024    CO2 29 06/20/2024     06/20/2024    BUN 19 06/20/2024    CREATININE 1.03 06/20/2024      Lab Results   Component Value Date    ALT 26 01/22/2024    AST 22 01/22/2024    ALKPHOS 105 01/22/2024    BILITOT 1.0 01/22/2024        Recent Echocardiogram (14D):   No echocardiogram results found for the past 14 days    TTE 12 month if available:  Transthoracic Echo (TTE) Complete    Result Date: 1/24/2024    [facility], [facility] [address], [facility] [id number] [phone] and                                 Fax [phone] TRANSTHORACIC ECHOCARDIOGRAM REPORT  Patient Name:      JAKOB KESSLER     Reading Physician:    [id number] Mariana Riley MD Study Date:        1/24/2024           Ordering Provider:    [id number] BALLARD MRN/PID:           [id number]            Fellow: Accession#:        [id number]        Nurse:                Lio Bowman RN Date of Birth/Age: 1950 " / 73 years Sonographer:          Wilson Montoya RDCS,                                                              FASE Gender:            M                   Additional Staff: Height:            170.18 cm           Admit Date:           1/22/2024 Weight:            53.52 kg            Admission Status:     Observation -                                                              Priority discharge BSA:               1.62 m2             Encounter#:           [id number]                                        Department Location:  [facility] Blood Pressure: 153 /89 mmHg Study Type:    TRANSTHORACIC ECHO (TTE) COMPLETE Diagnosis/ICD: Other cerebral infarction-I63.89 Indication:    Cerebrovascular Accident CPT Code:      Echo Complete w Full Doppler-[id number] Patient History: Pertinent History: CVA and Hyperlipidemia. Study Detail: The following Echo studies were performed: 2D, M-Mode, Doppler and               color flow. Technically challenging study due to body habitus and               Prominent rib artifacts/limitations. Agitated saline used as a               contrast agent for intraseptal flow evaluation.  PHYSICIAN INTERPRETATION: Left Ventricle: The left ventricular systolic function is normal, with an estimated ejection fraction of 65%. There are no regional wall motion abnormalities. The left ventricular cavity size is normal. Spectral Doppler shows an impaired relaxation pattern of left ventricular diastolic filling. Left Atrium: The left atrium is normal in size. A bubble study using agitated saline was performed. Bubble study is negative. Right Ventricle: The right ventricle is normal in size. There is normal right ventricular global systolic function. Right Atrium: The right atrium is normal in size. Aortic Valve: The aortic valve was not well visualized. There is no evidence of aortic valve regurgitation. The peak instantaneous gradient of the aortic valve is 5.4 mmHg. The mean gradient of the aortic  valve is 3.0 mmHg. Mitral Valve: The mitral valve is normal in structure. There is no evidence of mitral valve regurgitation. Tricuspid Valve: The tricuspid valve is structurally normal. No evidence of tricuspid regurgitation. Pulmonic Valve: The pulmonic valve is not well visualized. There is no indication of pulmonic valve regurgitation. Pericardium: There is no pericardial effusion noted. Aorta: The aortic root is normal.  CONCLUSIONS:  1. Left ventricular systolic function is normal with a 65% estimated ejection fraction.  2. Spectral Doppler shows an impaired relaxation pattern of left ventricular diastolic filling. QUANTITATIVE DATA SUMMARY: 2D MEASUREMENTS:                          Normal Ranges: Ao Root d:     3.30 cm   (2.0-3.7cm) LAs:           2.40 cm   (2.7-4.0cm) IVSd:          0.88 cm   (0.6-1.1cm) LVPWd:         0.74 cm   (0.6-1.1cm) LVIDd:         3.74 cm   (3.9-5.9cm) LVIDs:         1.96 cm LV Mass Index: 52.6 g/m2 LV % FS        47.6 % LA VOLUME:                               Normal Ranges: LA Vol A4C:        10.4 ml    (22+/-6mL/m2) LA Vol A2C:        18.1 ml LA Vol BP:         14.9 ml LA Vol Index A4C:  6.4ml/m2 LA Vol Index A2C:  11.2 ml/m2 LA Vol Index BP:   9.2 ml/m2 LA Area A4C:       7.0 cm2 LA Area A2C:       10.0 cm2 LA Major Axis A4C: 4.0 cm LA Major Axis A2C: 4.7 cm LA Volume Index:   8.0 ml/m2 RA VOLUME BY A/L METHOD:                       Normal Ranges: RA Area A4C: 12.0 cm2 M-MODE MEASUREMENTS:                  Normal Ranges: Ao Root: 3.30 cm (2.0-3.7cm) LAs:     3.20 cm (2.7-4.0cm) AORTA MEASUREMENTS:                    Normal Ranges: Asc Ao, d: 3.20 cm (2.1-3.4cm) LV SYSTOLIC FUNCTION BY 2D PLANIMETRY (MOD):                     Normal Ranges: EF-A4C View: 74.8 % (>=55%) EF-A2C View: 60.1 % EF-Biplane:  68.2 % LV DIASTOLIC FUNCTION:                        Normal Ranges: MV Peak E:    0.56 m/s (0.7-1.2 m/s) MV Peak A:    0.77 m/s (0.42-0.7 m/s) E/A Ratio:    0.72     (1.0-2.2) MV  lateral e' 0.12 m/s MV medial e'  0.08 m/s MITRAL VALVE:                 Normal Ranges: MV DT: 340 msec (150-240msec) AORTIC VALVE:                                   Normal Ranges: AoV Vmax:                1.16 m/s (<=1.7m/s) AoV Peak P.4 mmHg (<20mmHg) AoV Mean PG:             3.0 mmHg (1.7-11.5mmHg) LVOT Max Tariq:            0.98 m/s (<=1.1m/s) AoV VTI:                 19.40 cm (18-25cm) LVOT VTI:                18.20 cm LVOT Diameter:           1.90 cm  (1.8-2.4cm) AoV Area, VTI:           2.66 cm2 (2.5-5.5cm2) AoV Area,Vmax:           2.40 cm2 (2.5-4.5cm2) AoV Dimensionless Index: 0.94  RIGHT VENTRICLE: RV Basal 2.86 cm RV Mid   2.24 cm RV Major 5.9 cm TAPSE:   27.0 mm RV s'    0.15 m/s PULMONIC VALVE:                      Normal Ranges: PV Max Tariq: 1.0 m/s  (0.6-0.9m/s) PV Max P.3 mmHg  [id number] Mariana Riley MD Electronically signed on 2024 at 10:04:31 AM  ** Final **      Recent Imaging Results:  CT femur right wo IV contrast  Narrative: Interpreted By:  Jacobo Haywood,   STUDY:  CT FEMUR RIGHT WO IV CONTRAST;  2024 6:44 pm      INDICATION:  Signs/Symptoms:Pain, non weight bearing.      COMPARISON:  None.      ACCESSION NUMBER(S):  [id number]      ORDERING CLINICIAN:  TRACY SANFORD      TECHNIQUE:  Contiguous axial images of the right femur were obtained without  intravenous contrast. Coronal and sagittal reformatted images were  obtained from the axial images.      FINDINGS:  No evidence acute fracture dislocation of the right hip. No evidence  of acute displaced right femur fracture. There is right knee  osteoarthrosis with narrowing of the lateral compartment and marginal  osseous spurring. There is patellofemoral osteoarthrosis with joint  space narrowing and osseous spurring. No evidence of significant knee  effusion. Vascular calcifications.      Impression: No evidence of acute displaced right femur fracture.      MACRO:  None      Signed by: Jacobo Haywood 2024 7:43  PM  Dictation workstation:   [id number]  CT cervical spine wo IV contrast, CT head wo IV contrast, CT thoracic spine wo IV contrast, CT lumbar spine wo IV contrast  Narrative: Interpreted By:  Kermit Chakraborty,   STUDY:  CT HEAD WO IV CONTRAST; CT CERVICAL SPINE WO IV CONTRAST; CT LUMBAR  SPINE WO IV CONTRAST; CT THORACIC SPINE WO IV CONTRAST;  6/20/2024  3:28 pm      INDICATION:  Signs/Symptoms:Fall over 65; Signs/Symptoms:Fall with back pain and  pain radiating into right leg.      COMPARISON:  None.      ACCESSION NUMBER(S):  [id number]; [id number]; [id number]; [id number]      ORDERING CLINICIAN:  ZEB STEWART      TECHNIQUE:  Axial noncontrast CT images of head with coronal and sagittal  reconstructed images. Axial noncontrast CT images of the cervical  spine thoracic and lumbar spine with coronal and sagittal  reconstructed images.      FINDINGS:  CT HEAD:      BRAIN PARENCHYMA:  No evidence of acute intraparenchymal hemorrhage  or parenchymal evidence of acute large territory ischemic infarct. No  mass-effect, midline shift or effacement of cerebral sulci.  Gray-white matter distinction is preserved. Mild global volume loss      VENTRICLES and EXTRA-AXIAL SPACES:  No acute extra-axial or  intraventricular hemorrhage. Ventricles and sulci are age-concordant.      PARANASAL SINUSES/MASTOIDS:  No hemorrhage or air-fluid levels within  the visualized paranasal sinuses. The mastoid air cells are  well-aerated.      CALVARIUM/ORBITS:  No skull fracture.  The orbits and globes are  intact to the extent visualized.      EXTRACRANIAL SOFT TISSUES: No discernible abnormality.          CT CERVICAL SPINE:      PREVERTEBRAL SOFT TISSUES: Within normal limits.      CRANIOCERVICAL JUNCTION: Intact.      ALIGNMENT:  No traumatic malalignment or traumatic facet widening.      VERTEBRAE:  No acute fracture. Vertebral body heights are maintained.      Mild-to-moderate multilevel degenerative disc disease most notable  in  the lower cervical spine.      Thoracic spine: Mild wedging of the midthoracic spine; age  indeterminate. I can not categorically exclude acute compression  although this is felt to be more chronic appearing. Scattered  degenerative changes.      Evaluation of the lumbar spine shows severe degenerative disc disease  with pars defect at L5-S1 with grade 1 anterolisthesis. Severe  degenerative disc disease also noted L4-L5. These findings appear to  be chronic. No evidence of acute fracture of the lumbar spine.      Impression: CT HEAD:  No acute intracranial abnormality or calvarial fracture.  Senescent change      CT CERVICAL SPINE:  No acute fracture or traumatic malalignment of the cervical spine.  Mild multilevel degenerative disc disease.      Lumbar spine: Grade 1 anterolisthesis L5-S1 with pars defects and  severe degenerative changes. Marked degenerative changes also noted  L4-L5. No evidence of acute lumbar spine fracture.      Thoracic spine: Scattered degenerative changes in the thoracic spine  with slight wedging. All of these findings of felt to be chronic. If  there is ongoing focal mid back pain, thoracic spine MRI could add  additional diagnostic information      Signed by: Kermit Chakraborty 6/20/2024 3:42 PM  Dictation workstation:   [id number]  XR hip right with pelvis when performed 2 or 3 views  Narrative: Interpreted By:  Gallo Arcos,   STUDY:  XR HIP RIGHT WITH PELVIS WHEN PERFORMED 2 OR 3 VIEWS;  6/20/2024 2:59  pm      INDICATION:  Signs/Symptoms:Fall with right hip pain..      COMPARISON:  None.      ACCESSION NUMBER(S):  [id number]      ORDERING CLINICIAN:  ZEB STEWART      FINDINGS:  Bony structures:  Intact      Joint spaces:  Maintained      Soft tissues:  Unremarkable without significant edema or radiodense  foreign body      Other:  Spondylosis of the included lumbar spine particularly at the  L5-S1 level.      Impression: No acute findings.      MACRO:  None      Signed by:  Gallo Arcos 6/20/2024 3:37 PM  Dictation workstation:   [id number]      Exam     GENERAL:   no distress, alert and cooperative  HEENT: Normal Inspection, Mucous membranes moist, No JVD, No Lymphadenopathy  CARDIOVASCULAR: RRR, no murmurs, 2+ equal pulses of the extremities, normal S1 and S 2  RESPIRATORY: Patent airways, CTAB, thorax symmetric, No significant wheezing, Rales or Rhonchi  ABDOMEN: Soft, Non-Tender, Normal Bowel Sounds, No Distention  SKIN: Warm and dry, no lesions, no rashes  EXTREMITIES: normal extremities, no significant cyanosis edema, contusions or wounds, no obsvious clubbing  NEURO: A&O x 3, CN II-XII grossly intact  PSYCH: Appropriate mood and behavior    Additional Physical Exam Notes/Findings        ======= SCHEDULED MEDICATIONS =======  Scheduled medications   Medication Dose Route Frequency    aspirin  81 mg oral Daily    atorvastatin  40 mg oral Nightly    enoxaparin  40 mg subcutaneous q24h    psyllium  1 packet oral Daily       ========== PRN MEDICATIONS =========  acetaminophen, 650 mg, q4h PRN   Or  acetaminophen, 650 mg, q4h PRN   Or  acetaminophen, 650 mg, q4h PRN  traMADol, 25 mg, q8h PRN        ==============  DIET  ==============  Dietary Orders (From admission, onward)       Start     Ordered    06/20/24 1738  Adult diet Regular  Diet effective now        Question:  Diet type  Answer:  Regular    06/20/24 1737                    ====== Assessment/Plan   =======    ASSESSMENT:  Active Problems:    Generalized weakness    ___________________________________________________    #Fall  #Generalized weakness  #Leukocytosis, resolved  CAD  Cerebral palsy        PLAN:    PO pain medications.    PT/OT.    DC Planning for SNF.    Hemodynamically stable.       DVT Prophylaxis  SubQ Lovenox.    SUMMARY/DISPOSITION  Patient remained hemodynamically stable.  Tolerating oral intake.  Pain controlled at this time.  Discharge to skilled nursing facility pending discharge planning and  pre-CERT.               Alvino Tomlin, DO

## 2024-06-23 NOTE — CARE PLAN
The patient's goals for the shift include  safety    The clinical goals for the shift include patrient will remain comfortable throughout the shift

## 2024-06-23 NOTE — PROGRESS NOTES
Discharge written. Level 2 Screen tripped.   State assessors do not work on weekend, screen is still pending. GIULIANA Hanson

## 2024-06-24 PROCEDURE — 2500000004 HC RX 250 GENERAL PHARMACY W/ HCPCS (ALT 636 FOR OP/ED)

## 2024-06-24 PROCEDURE — G0378 HOSPITAL OBSERVATION PER HR: HCPCS

## 2024-06-24 PROCEDURE — 2500000001 HC RX 250 WO HCPCS SELF ADMINISTERED DRUGS (ALT 637 FOR MEDICARE OP)

## 2024-06-24 PROCEDURE — 96372 THER/PROPH/DIAG INJ SC/IM: CPT

## 2024-06-24 PROCEDURE — 99231 SBSQ HOSP IP/OBS SF/LOW 25: CPT | Performed by: STUDENT IN AN ORGANIZED HEALTH CARE EDUCATION/TRAINING PROGRAM

## 2024-06-24 ASSESSMENT — COGNITIVE AND FUNCTIONAL STATUS - GENERAL
DRESSING REGULAR UPPER BODY CLOTHING: A LITTLE
MOBILITY SCORE: 20
MOVING TO AND FROM BED TO CHAIR: A LITTLE
STANDING UP FROM CHAIR USING ARMS: A LITTLE
CLIMB 3 TO 5 STEPS WITH RAILING: A LITTLE
DRESSING REGULAR LOWER BODY CLOTHING: A LITTLE
MOBILITY SCORE: 20
PERSONAL GROOMING: A LITTLE
WALKING IN HOSPITAL ROOM: A LITTLE
DRESSING REGULAR UPPER BODY CLOTHING: A LITTLE
HELP NEEDED FOR BATHING: A LITTLE
TOILETING: A LITTLE
PERSONAL GROOMING: A LITTLE
STANDING UP FROM CHAIR USING ARMS: A LITTLE
TOILETING: A LITTLE
CLIMB 3 TO 5 STEPS WITH RAILING: A LITTLE
WALKING IN HOSPITAL ROOM: A LITTLE
HELP NEEDED FOR BATHING: A LITTLE
MOVING TO AND FROM BED TO CHAIR: A LITTLE
DAILY ACTIVITIY SCORE: 19
DRESSING REGULAR LOWER BODY CLOTHING: A LITTLE
DAILY ACTIVITIY SCORE: 19

## 2024-06-24 ASSESSMENT — PAIN SCALES - GENERAL
PAINLEVEL_OUTOF10: 0 - NO PAIN
PAINLEVEL_OUTOF10: 0 - NO PAIN

## 2024-06-24 NOTE — CARE PLAN
The patient's goals for the shift include      The clinical goals for the shift include pt will remain safe and free from injury throughout shift    Over the shift, the patient did not make progress toward the following goals. Barriers to progression include acute illness. Recommendations to address these barriers include communication.

## 2024-06-24 NOTE — SIGNIFICANT EVENT
7000 Physician Certification      As the individual's attending physician , I certify that the above-named patient:  Is being discharged to a nursing facility directly from a hospital after receiving acute patient care at the hospital, and  Requires nursing facility services for the condition for which he/she received care in the hospital, and  Requires fewer than 30 days of nursing facility services, no later than the date of discharge.    I certify that inpatient care is required at the level recommended above. To the best of my knowledge, all information provided about the individual is a true and accurate reflection of the individual's condition.

## 2024-06-24 NOTE — CARE PLAN
The patient's goals for the shift include  safety    The clinical goals for the shift include patient will be safe throughout the shift

## 2024-06-24 NOTE — PROGRESS NOTES
Josh Kessler is a 74 y.o. male on day 0 of admission presenting with Fall at home, initial encounter.      Subjective   Patient feeling better overall.  Denies any other complaints and is able to stand on his leg today.  Discharge planning to SNF       Objective     Last Recorded Vitals  /73 (BP Location: Left arm, Patient Position: Lying)   Pulse 71   Temp 36.5 °C (97.7 °F) (Temporal)   Resp 16   Wt 54.4 kg (120 lb)   SpO2 97%   Intake/Output last 3 Shifts:    Intake/Output Summary (Last 24 hours) at 6/24/2024 1148  Last data filed at 6/24/2024 0800  Gross per 24 hour   Intake 592 ml   Output 490 ml   Net 102 ml       Admission Weight  Weight: 54.4 kg (120 lb) (06/20/24 1439)    Daily Weight  06/20/24 : 54.4 kg (120 lb)    Image Results  CT femur right wo IV contrast  Narrative: Interpreted By:  Jacobo Haywood,   STUDY:  CT FEMUR RIGHT WO IV CONTRAST;  6/20/2024 6:44 pm      INDICATION:  Signs/Symptoms:Pain, non weight bearing.      COMPARISON:  None.      ACCESSION NUMBER(S):  [id number]      ORDERING CLINICIAN:  TRACY SANFORD      TECHNIQUE:  Contiguous axial images of the right femur were obtained without  intravenous contrast. Coronal and sagittal reformatted images were  obtained from the axial images.      FINDINGS:  No evidence acute fracture dislocation of the right hip. No evidence  of acute displaced right femur fracture. There is right knee  osteoarthrosis with narrowing of the lateral compartment and marginal  osseous spurring. There is patellofemoral osteoarthrosis with joint  space narrowing and osseous spurring. No evidence of significant knee  effusion. Vascular calcifications.      Impression: No evidence of acute displaced right femur fracture.      MACRO:  None      Signed by: Jacobo Haywood 6/20/2024 7:43 PM  Dictation workstation:   [id number]  CT cervical spine wo IV contrast, CT head wo IV contrast, CT thoracic spine wo IV contrast, CT lumbar spine wo IV contrast  Narrative:  Interpreted By:  Kermit Chakraborty,   STUDY:  CT HEAD WO IV CONTRAST; CT CERVICAL SPINE WO IV CONTRAST; CT LUMBAR  SPINE WO IV CONTRAST; CT THORACIC SPINE WO IV CONTRAST;  6/20/2024  3:28 pm      INDICATION:  Signs/Symptoms:Fall over 65; Signs/Symptoms:Fall with back pain and  pain radiating into right leg.      COMPARISON:  None.      ACCESSION NUMBER(S):  [id number]; [id number]; [id number]; [id number]      ORDERING CLINICIAN:  ZEB STEWART      TECHNIQUE:  Axial noncontrast CT images of head with coronal and sagittal  reconstructed images. Axial noncontrast CT images of the cervical  spine thoracic and lumbar spine with coronal and sagittal  reconstructed images.      FINDINGS:  CT HEAD:      BRAIN PARENCHYMA:  No evidence of acute intraparenchymal hemorrhage  or parenchymal evidence of acute large territory ischemic infarct. No  mass-effect, midline shift or effacement of cerebral sulci.  Gray-white matter distinction is preserved. Mild global volume loss      VENTRICLES and EXTRA-AXIAL SPACES:  No acute extra-axial or  intraventricular hemorrhage. Ventricles and sulci are age-concordant.      PARANASAL SINUSES/MASTOIDS:  No hemorrhage or air-fluid levels within  the visualized paranasal sinuses. The mastoid air cells are  well-aerated.      CALVARIUM/ORBITS:  No skull fracture.  The orbits and globes are  intact to the extent visualized.      EXTRACRANIAL SOFT TISSUES: No discernible abnormality.          CT CERVICAL SPINE:      PREVERTEBRAL SOFT TISSUES: Within normal limits.      CRANIOCERVICAL JUNCTION: Intact.      ALIGNMENT:  No traumatic malalignment or traumatic facet widening.      VERTEBRAE:  No acute fracture. Vertebral body heights are maintained.      Mild-to-moderate multilevel degenerative disc disease most notable in  the lower cervical spine.      Thoracic spine: Mild wedging of the midthoracic spine; age  indeterminate. I can not categorically exclude acute compression  although this is  felt to be more chronic appearing. Scattered  degenerative changes.      Evaluation of the lumbar spine shows severe degenerative disc disease  with pars defect at L5-S1 with grade 1 anterolisthesis. Severe  degenerative disc disease also noted L4-L5. These findings appear to  be chronic. No evidence of acute fracture of the lumbar spine.      Impression: CT HEAD:  No acute intracranial abnormality or calvarial fracture.  Senescent change      CT CERVICAL SPINE:  No acute fracture or traumatic malalignment of the cervical spine.  Mild multilevel degenerative disc disease.      Lumbar spine: Grade 1 anterolisthesis L5-S1 with pars defects and  severe degenerative changes. Marked degenerative changes also noted  L4-L5. No evidence of acute lumbar spine fracture.      Thoracic spine: Scattered degenerative changes in the thoracic spine  with slight wedging. All of these findings of felt to be chronic. If  there is ongoing focal mid back pain, thoracic spine MRI could add  additional diagnostic information      Signed by: Kermit Chakraborty 6/20/2024 3:42 PM  Dictation workstation:   [id number]  XR hip right with pelvis when performed 2 or 3 views  Narrative: Interpreted By:  Gallo Arcos,   STUDY:  XR HIP RIGHT WITH PELVIS WHEN PERFORMED 2 OR 3 VIEWS;  6/20/2024 2:59  pm      INDICATION:  Signs/Symptoms:Fall with right hip pain..      COMPARISON:  None.      ACCESSION NUMBER(S):  [id number]      ORDERING CLINICIAN:  ZEB STEWART      FINDINGS:  Bony structures:  Intact      Joint spaces:  Maintained      Soft tissues:  Unremarkable without significant edema or radiodense  foreign body      Other:  Spondylosis of the included lumbar spine particularly at the  L5-S1 level.      Impression: No acute findings.      MACRO:  None      Signed by: Gallo Arcos 6/20/2024 3:37 PM  Dictation workstation:   [id number]      Physical Exam  General:  Pleasant and cooperative. No apparent distress.  HEENT:  Normocephalic,  atraumatic, mucus membranes moist.   Neck:  Trachea midline.  No JVD.    Chest:  Clear to auscultation bilaterally. No wheezes, rales, or rhonchi.  CV:  Regular rate and rhythm.  Positive S1/S2.   Abdomen: Bowel sounds present in all four quadrants, abdomen is soft, non-tender, non-distended.  Extremities:  No lower extremity edema or cyanosis.   Neurological:  AAOx3. No focal deficits.  Skin:  Warm and dry.      Assessment/Plan   Active Problems:    Generalized weakness    #Fall  #Generalized weakness  #Leukocytosis, resolved  -CT did not show acute fracture or abnormality.  Patient reports improvement in pain and is able to bear weight on his leg today.    -PT/OT   -Discharge planning for SNF     Chronic:  CAD  Cerebral palsy  -Continue home meds     DVT prophylaxis: Lovenox  CODE STATUS: DNR/DNI    Woody Del Toro DO

## 2024-06-25 PROCEDURE — 97116 GAIT TRAINING THERAPY: CPT | Mod: GP,CQ

## 2024-06-25 PROCEDURE — 96372 THER/PROPH/DIAG INJ SC/IM: CPT

## 2024-06-25 PROCEDURE — 2500000004 HC RX 250 GENERAL PHARMACY W/ HCPCS (ALT 636 FOR OP/ED)

## 2024-06-25 PROCEDURE — G0378 HOSPITAL OBSERVATION PER HR: HCPCS

## 2024-06-25 PROCEDURE — 2500000001 HC RX 250 WO HCPCS SELF ADMINISTERED DRUGS (ALT 637 FOR MEDICARE OP)

## 2024-06-25 PROCEDURE — 99231 SBSQ HOSP IP/OBS SF/LOW 25: CPT | Performed by: INTERNAL MEDICINE

## 2024-06-25 PROCEDURE — 97535 SELF CARE MNGMENT TRAINING: CPT | Mod: GP,CQ

## 2024-06-25 ASSESSMENT — COGNITIVE AND FUNCTIONAL STATUS - GENERAL
DRESSING REGULAR UPPER BODY CLOTHING: A LITTLE
MOBILITY SCORE: 20
TOILETING: A LITTLE
HELP NEEDED FOR BATHING: A LITTLE
STANDING UP FROM CHAIR USING ARMS: A LITTLE
CLIMB 3 TO 5 STEPS WITH RAILING: TOTAL
STANDING UP FROM CHAIR USING ARMS: A LITTLE
MOVING TO AND FROM BED TO CHAIR: A LITTLE
STANDING UP FROM CHAIR USING ARMS: A LITTLE
DRESSING REGULAR LOWER BODY CLOTHING: A LITTLE
DRESSING REGULAR LOWER BODY CLOTHING: A LITTLE
WALKING IN HOSPITAL ROOM: A LITTLE
CLIMB 3 TO 5 STEPS WITH RAILING: A LITTLE
TOILETING: A LITTLE
PERSONAL GROOMING: A LITTLE
CLIMB 3 TO 5 STEPS WITH RAILING: A LITTLE
MOVING TO AND FROM BED TO CHAIR: A LITTLE
MOBILITY SCORE: 18
HELP NEEDED FOR BATHING: A LITTLE
MOVING TO AND FROM BED TO CHAIR: A LITTLE
DAILY ACTIVITIY SCORE: 19
DRESSING REGULAR UPPER BODY CLOTHING: A LITTLE
DAILY ACTIVITIY SCORE: 19
WALKING IN HOSPITAL ROOM: A LITTLE
MOBILITY SCORE: 20
WALKING IN HOSPITAL ROOM: A LITTLE
PERSONAL GROOMING: A LITTLE

## 2024-06-25 ASSESSMENT — PAIN SCALES - GENERAL
PAINLEVEL_OUTOF10: 0 - NO PAIN

## 2024-06-25 ASSESSMENT — PAIN - FUNCTIONAL ASSESSMENT: PAIN_FUNCTIONAL_ASSESSMENT: 0-10

## 2024-06-25 NOTE — CARE PLAN
Problem: HP General Problem  Goal: HP General Goal  Outcome: Progressing     Problem: Fall/Injury  Goal: Not fall by end of shift  Outcome: Progressing  Goal: Be free from injury by end of the shift  Outcome: Progressing  Goal: Verbalize understanding of personal risk factors for fall in the hospital  Outcome: Progressing  Goal: Verbalize understanding of risk factor reduction measures to prevent injury from fall in the home  Outcome: Progressing  Goal: Use assistive devices by end of the shift  Outcome: Progressing  Goal: Pace activities to prevent fatigue by end of the shift  Outcome: Progressing   The patient's goals for the shift include      The clinical goals for the shift include pt will remain safe and free from injury throughout shift    Over the shift, the patient did not make progress toward the following goals. Barriers to progression include patient is experiencing some leg weakness at times. Recommendations to address these barriers include remind patient to use call light when in need of assistance.

## 2024-06-25 NOTE — PROGRESS NOTES
Physical Therapy    Physical Therapy Treatment    Patient Name: Josh Kessler  MRN: [id number]  Today's Date: 6/25/2024  Time Calculation  Start Time: 1019  Stop Time: 1043  Time Calculation (min): 24 min  320-A    Assessment/Plan   PT Assessment  End of Session Patient Position: Up in chair, Alarm off, not on at start of session (no alarm needed per communication with RN)     PT Plan  Treatment/Interventions: Bed mobility, Transfer training, Gait training, Balance training, Neuromuscular re-education, Strengthening, Endurance training, Therapeutic exercise, Therapeutic activity  PT Plan: Ongoing PT  PT Frequency: 4 times per week  PT Discharge Recommendations: Moderate intensity level of continued care  PT - OK to Discharge: Yes      General Visit Information:   PT  Visit  PT Received On: 06/25/24  General  Prior to Session Communication: Bedside nurse  Patient Position Received: Bed, 2 rail up, Alarm off, not on at start of session  General Comment:  (pt very pleasant and agreeable to participate in therapy session.  reports feeling better and able to weightbear LEs more this date.)    Subjective   Precautions:  Precautions  Medical Precautions: Fall precautions       Objective   Pain:  Pain Assessment  Pain Assessment: 0-10  0-10 (Numeric) Pain Score: 0 - No pain     Treatments:  Bed Mobility  Bed Mobility:  (sup > sit independent)    Ambulation/Gait Training  Ambulation/Gait Training Performed:  (pt ambulates with FWW >/=10 ft x 2 and 15 ft x 2 with seated rest break between.  pt also ambulates additional ~3 ft bed > chair with no AD.  SBA/CGA throughout.  bilat heels tend to be raised 2/2 CP.  pt also with NBOS and scissoring at times however pt very aware of gait; no issues of instability or LOB noted.)    Transfers  Transfer:  (sit <> stand x 3 trials, SBA to supervision)    Outcome Measures:  [facility] Basic Mobility  Turning from your back to your side while in a flat bed without using bedrails: None  Moving  from lying on your back to sitting on the side of a flat bed without using bedrails: None  Moving to and from bed to chair (including a wheelchair): A little  Standing up from a chair using your arms (e.g. wheelchair or bedside chair): A little  To walk in hospital room: A little  Climbing 3-5 steps with railing: Total  Basic Mobility - Total Score: 18    Education Documentation  Mobility Training, taught by Radhika Hodge PTA at 6/25/2024  3:03 PM.  Learner: Patient  Readiness: Acceptance  Method: Explanation  Response: Verbalizes Understanding    Education Comments  No comments found.        EDUCATION:       Encounter Problems       Encounter Problems (Active)       PT Problem       STG - Pt will transition supine <> sitting with mod I (Progressing)       Start:  06/21/24    Expected End:  07/05/24            STG - Pt will transfer STS with mod I (Progressing)       Start:  06/21/24    Expected End:  07/05/24            STG - Pt will amb >/=50' using WW with SBA (Progressing)       Start:  06/21/24    Expected End:  07/05/24            STG - Pt will maintain F+ dynamic standing balance to decrease risk of falls (Progressing)       Start:  06/21/24    Expected End:  07/05/24

## 2024-06-25 NOTE — CARE PLAN
The patient's goals for the shift include      The clinical goals for the shift include Pt will remain safe and in good spirits throughout shift    Over the shift, the patient did not make progress toward the following goals. Barriers to progression include acute illness. Recommendations to address these barriers include communication.

## 2024-06-25 NOTE — PROGRESS NOTES
Josh Kessler is a 74 y.o. male on day 0 of admission presenting with Fall at home, initial encounter.      Subjective   Patient feeling better overall.  Denies any other complaints and is able to bear more weight on his leg today.  Discharge planning to SNF       Objective     Last Recorded Vitals  /82 (BP Location: Left arm, Patient Position: Lying)   Pulse 72   Temp 36.6 °C (97.9 °F) (Temporal)   Resp 16   Wt 54.4 kg (120 lb)   SpO2 95%   Intake/Output last 3 Shifts:    Intake/Output Summary (Last 24 hours) at 6/25/2024 1317  Last data filed at 6/24/2024 2047  Gross per 24 hour   Intake --   Output 300 ml   Net -300 ml       Admission Weight  Weight: 54.4 kg (120 lb) (06/20/24 1439)    Daily Weight  06/20/24 : 54.4 kg (120 lb)    Image Results  CT femur right wo IV contrast  Narrative: Interpreted By:  Jacobo Haywood,   STUDY:  CT FEMUR RIGHT WO IV CONTRAST;  6/20/2024 6:44 pm      INDICATION:  Signs/Symptoms:Pain, non weight bearing.      COMPARISON:  None.      ACCESSION NUMBER(S):  [id number]      ORDERING CLINICIAN:  TRACY SANFORD      TECHNIQUE:  Contiguous axial images of the right femur were obtained without  intravenous contrast. Coronal and sagittal reformatted images were  obtained from the axial images.      FINDINGS:  No evidence acute fracture dislocation of the right hip. No evidence  of acute displaced right femur fracture. There is right knee  osteoarthrosis with narrowing of the lateral compartment and marginal  osseous spurring. There is patellofemoral osteoarthrosis with joint  space narrowing and osseous spurring. No evidence of significant knee  effusion. Vascular calcifications.      Impression: No evidence of acute displaced right femur fracture.      MACRO:  None      Signed by: Jacobo Haywood 6/20/2024 7:43 PM  Dictation workstation:   [id number]  CT cervical spine wo IV contrast, CT head wo IV contrast, CT thoracic spine wo IV contrast, CT lumbar spine wo IV  contrast  Narrative: Interpreted By:  Kermit Chakraborty,   STUDY:  CT HEAD WO IV CONTRAST; CT CERVICAL SPINE WO IV CONTRAST; CT LUMBAR  SPINE WO IV CONTRAST; CT THORACIC SPINE WO IV CONTRAST;  6/20/2024  3:28 pm      INDICATION:  Signs/Symptoms:Fall over 65; Signs/Symptoms:Fall with back pain and  pain radiating into right leg.      COMPARISON:  None.      ACCESSION NUMBER(S):  [id number]; [id number]; [id number]; [id number]      ORDERING CLINICIAN:  ZEB STEWART      TECHNIQUE:  Axial noncontrast CT images of head with coronal and sagittal  reconstructed images. Axial noncontrast CT images of the cervical  spine thoracic and lumbar spine with coronal and sagittal  reconstructed images.      FINDINGS:  CT HEAD:      BRAIN PARENCHYMA:  No evidence of acute intraparenchymal hemorrhage  or parenchymal evidence of acute large territory ischemic infarct. No  mass-effect, midline shift or effacement of cerebral sulci.  Gray-white matter distinction is preserved. Mild global volume loss      VENTRICLES and EXTRA-AXIAL SPACES:  No acute extra-axial or  intraventricular hemorrhage. Ventricles and sulci are age-concordant.      PARANASAL SINUSES/MASTOIDS:  No hemorrhage or air-fluid levels within  the visualized paranasal sinuses. The mastoid air cells are  well-aerated.      CALVARIUM/ORBITS:  No skull fracture.  The orbits and globes are  intact to the extent visualized.      EXTRACRANIAL SOFT TISSUES: No discernible abnormality.          CT CERVICAL SPINE:      PREVERTEBRAL SOFT TISSUES: Within normal limits.      CRANIOCERVICAL JUNCTION: Intact.      ALIGNMENT:  No traumatic malalignment or traumatic facet widening.      VERTEBRAE:  No acute fracture. Vertebral body heights are maintained.      Mild-to-moderate multilevel degenerative disc disease most notable in  the lower cervical spine.      Thoracic spine: Mild wedging of the midthoracic spine; age  indeterminate. I can not categorically exclude acute  compression  although this is felt to be more chronic appearing. Scattered  degenerative changes.      Evaluation of the lumbar spine shows severe degenerative disc disease  with pars defect at L5-S1 with grade 1 anterolisthesis. Severe  degenerative disc disease also noted L4-L5. These findings appear to  be chronic. No evidence of acute fracture of the lumbar spine.      Impression: CT HEAD:  No acute intracranial abnormality or calvarial fracture.  Senescent change      CT CERVICAL SPINE:  No acute fracture or traumatic malalignment of the cervical spine.  Mild multilevel degenerative disc disease.      Lumbar spine: Grade 1 anterolisthesis L5-S1 with pars defects and  severe degenerative changes. Marked degenerative changes also noted  L4-L5. No evidence of acute lumbar spine fracture.      Thoracic spine: Scattered degenerative changes in the thoracic spine  with slight wedging. All of these findings of felt to be chronic. If  there is ongoing focal mid back pain, thoracic spine MRI could add  additional diagnostic information      Signed by: Kermit Chakraborty 6/20/2024 3:42 PM  Dictation workstation:   [id number]  XR hip right with pelvis when performed 2 or 3 views  Narrative: Interpreted By:  Gallo Arcos,   STUDY:  XR HIP RIGHT WITH PELVIS WHEN PERFORMED 2 OR 3 VIEWS;  6/20/2024 2:59  pm      INDICATION:  Signs/Symptoms:Fall with right hip pain..      COMPARISON:  None.      ACCESSION NUMBER(S):  [id number]      ORDERING CLINICIAN:  ZEB STEWART      FINDINGS:  Bony structures:  Intact      Joint spaces:  Maintained      Soft tissues:  Unremarkable without significant edema or radiodense  foreign body      Other:  Spondylosis of the included lumbar spine particularly at the  L5-S1 level.      Impression: No acute findings.      MACRO:  None      Signed by: Gallo Arcos 6/20/2024 3:37 PM  Dictation workstation:   [id number]      Physical Exam  General:  Pleasant and cooperative. No apparent  distress.  HEENT:  Normocephalic, atraumatic, mucus membranes moist.   Neck:  Trachea midline.  No JVD.    Chest:  Clear to auscultation bilaterally. No wheezes, rales, or rhonchi.  CV:  Regular rate and rhythm.  Positive S1/S2.   Abdomen: Bowel sounds present in all four quadrants, abdomen is soft, non-tender, non-distended.  Extremities:  No lower extremity edema or cyanosis.   Neurological:  AAOx3. No focal deficits.  Skin:  Warm and dry.      Assessment/Plan   Active Problems:    Generalized weakness    #Fall  #Generalized weakness  #Leukocytosis, resolved  -CT did not show acute fracture or abnormality.  Patient reports improvement in pain and is able to bear more weight on his leg today.    -PT/OT   -Discharge planning for SNF     Chronic:  CAD  Cerebral palsy  -Continue home meds     DVT prophylaxis: Lovenox  CODE STATUS: DNR/DNI    Woody Del Toro DO

## 2024-06-26 PROCEDURE — 97116 GAIT TRAINING THERAPY: CPT | Mod: GP,CQ

## 2024-06-26 PROCEDURE — 2500000001 HC RX 250 WO HCPCS SELF ADMINISTERED DRUGS (ALT 637 FOR MEDICARE OP)

## 2024-06-26 PROCEDURE — 99231 SBSQ HOSP IP/OBS SF/LOW 25: CPT | Performed by: INTERNAL MEDICINE

## 2024-06-26 PROCEDURE — 96372 THER/PROPH/DIAG INJ SC/IM: CPT

## 2024-06-26 PROCEDURE — 97110 THERAPEUTIC EXERCISES: CPT | Mod: GP,CQ

## 2024-06-26 PROCEDURE — 2500000004 HC RX 250 GENERAL PHARMACY W/ HCPCS (ALT 636 FOR OP/ED)

## 2024-06-26 PROCEDURE — G0378 HOSPITAL OBSERVATION PER HR: HCPCS

## 2024-06-26 ASSESSMENT — COGNITIVE AND FUNCTIONAL STATUS - GENERAL
STANDING UP FROM CHAIR USING ARMS: A LITTLE
CLIMB 3 TO 5 STEPS WITH RAILING: TOTAL
WALKING IN HOSPITAL ROOM: A LITTLE
MOVING TO AND FROM BED TO CHAIR: A LITTLE
MOBILITY SCORE: 18

## 2024-06-26 ASSESSMENT — PAIN SCALES - GENERAL: PAINLEVEL_OUTOF10: 0 - NO PAIN

## 2024-06-26 NOTE — CARE PLAN
The patient's goals for the shift include      The clinical goals for the shift include Pt will remain safe and in good spirits throughout shift

## 2024-06-26 NOTE — PROGRESS NOTES
Physical Therapy    Physical Therapy Treatment    Patient Name: Josh Kessler  MRN: [id number]  Today's Date: 6/26/2024  Time Calculation  Start Time: 1441  Stop Time: 1507  Time Calculation (min): 26 min    Assessment/Plan   PT Assessment  End of Session Communication: Bedside nurse  End of Session Patient Position: Up in chair, Alarm off, not on at start of session (call light and needs within reach.)     PT Plan  Treatment/Interventions: Bed mobility, Transfer training, Gait training, Balance training, Neuromuscular re-education, Strengthening, Endurance training, Therapeutic exercise, Therapeutic activity  PT Plan: Ongoing PT  PT Frequency: 4 times per week  PT Discharge Recommendations: Moderate intensity level of continued care  PT - OK to Discharge: Yes      General Visit Information:   PT  Visit  PT Received On: 06/26/24  General  Prior to Session Communication: Bedside nurse  Patient Position Received: Up in chair, Alarm off, not on at start of session  General Comment: Patient pleasant and fully participatory in therapy.    Subjective   Precautions:  Precautions  Medical Precautions: Fall precautions  Precautions Comment: falls, R hip pain, CP, OOB with assist  Vital Signs:       Objective   Treatments:  Therapeutic Exercise  Therapeutic Exercise Performed: Yes  Therapeutic Exercise Activity 1: Patient performed seated therex, BLE: APs, hip flexion, LAQ, heel slides, and hip abd/add all [id number]    Ambulation/Gait Training  Ambulation/Gait Training Performed: Yes  Ambulation/Gait Training 1  Comments/Distance (ft) 1: Patient ambulated ~15ft x2 trials with CGA/SBA and FWW for support. Patient ambulating on toes with heels raised and NBOS with scissoring. Per patient this is his baseline, no risk for falling.  Transfers  Transfer: Yes  Transfer 1  Trials/Comments 1: Patient performed sit<>stand with SBA x1.    Outcome Measures:  [facility] Basic Mobility  Turning from your back to your side while in a flat bed  without using bedrails: None  Moving from lying on your back to sitting on the side of a flat bed without using bedrails: None  Moving to and from bed to chair (including a wheelchair): A little  Standing up from a chair using your arms (e.g. wheelchair or bedside chair): A little  To walk in hospital room: A little  Climbing 3-5 steps with railing: Total  Basic Mobility - Total Score: 18    Education Documentation  Body Mechanics, taught by Suzanne Duval PTA at 6/26/2024  3:32 PM.  Learner: Patient  Readiness: Acceptance  Method: Explanation  Response: Verbalizes Understanding  Comment: Educated patient on proper hand placement to facilitate safe sit<>Stand transfer.    ADL Training, taught by Suzanne Duval PTA at 6/26/2024  3:32 PM.  Learner: Patient  Readiness: Acceptance  Method: Explanation  Response: Verbalizes Understanding  Comment: Educated patient on proper hand placement to facilitate safe sit<>Stand transfer.    Mobility Training, taught by Suzanne Duval PTA at 6/26/2024  3:32 PM.  Learner: Patient  Readiness: Acceptance  Method: Explanation  Response: Verbalizes Understanding  Comment: Educated patient on proper hand placement to facilitate safe sit<>Stand transfer.    Education Comments  No comments found.        OP EDUCATION:       Encounter Problems       Encounter Problems (Active)       PT Problem       STG - Pt will transition supine <> sitting with mod I (Met)       Start:  06/21/24    Expected End:  07/05/24    Resolved:  06/26/24         STG - Pt will transfer STS with mod I (Progressing)       Start:  06/21/24    Expected End:  07/05/24            STG - Pt will amb >/=50' using WW with SBA (Progressing)       Start:  06/21/24    Expected End:  07/05/24            STG - Pt will maintain F+ dynamic standing balance to decrease risk of falls (Progressing)       Start:  06/21/24    Expected End:  07/05/24               Safety       LTG - Patient will adhere to hip precautions during ADL's  and transfers       Start:  06/20/24            LTG - Patient will demonstrate safety requirements appropriate to situation/environment       Start:  06/20/24            LTG - Patient will utilize safety techniques       Start:  06/20/24            STG - Patient locks brakes on wheelchair       Start:  06/20/24            STG - Patient uses call light consistently to request assistance with transfers       Start:  06/20/24

## 2024-06-26 NOTE — PROGRESS NOTES
Josh Kessler is a 74 y.o. male on day 0 of admission presenting with Fall at home, initial encounter.      Subjective   Patient reports feeling well with no new complaints.  Discharge planning to SNF       Objective     Last Recorded Vitals  /77 (BP Location: Left arm, Patient Position: Lying)   Pulse 71   Temp 36.7 °C (98.1 °F) (Temporal)   Resp 16   Wt 54.4 kg (120 lb)   SpO2 97%   Intake/Output last 3 Shifts:    Intake/Output Summary (Last 24 hours) at 6/26/2024 1345  Last data filed at 6/25/2024 1800  Gross per 24 hour   Intake --   Output 1200 ml   Net -1200 ml       Admission Weight  Weight: 54.4 kg (120 lb) (06/20/24 1439)    Daily Weight  06/20/24 : 54.4 kg (120 lb)    Image Results  CT femur right wo IV contrast  Narrative: Interpreted By:  Jacobo Haywood,   STUDY:  CT FEMUR RIGHT WO IV CONTRAST;  6/20/2024 6:44 pm      INDICATION:  Signs/Symptoms:Pain, non weight bearing.      COMPARISON:  None.      ACCESSION NUMBER(S):  [id number]      ORDERING CLINICIAN:  TRACY SANFORD      TECHNIQUE:  Contiguous axial images of the right femur were obtained without  intravenous contrast. Coronal and sagittal reformatted images were  obtained from the axial images.      FINDINGS:  No evidence acute fracture dislocation of the right hip. No evidence  of acute displaced right femur fracture. There is right knee  osteoarthrosis with narrowing of the lateral compartment and marginal  osseous spurring. There is patellofemoral osteoarthrosis with joint  space narrowing and osseous spurring. No evidence of significant knee  effusion. Vascular calcifications.      Impression: No evidence of acute displaced right femur fracture.      MACRO:  None      Signed by: Jacobo Haywood 6/20/2024 7:43 PM  Dictation workstation:   [id number]  CT cervical spine wo IV contrast, CT head wo IV contrast, CT thoracic spine wo IV contrast, CT lumbar spine wo IV contrast  Narrative: Interpreted By:  Kermit Chakraborty,   STUDY:  CT  HEAD WO IV CONTRAST; CT CERVICAL SPINE WO IV CONTRAST; CT LUMBAR  SPINE WO IV CONTRAST; CT THORACIC SPINE WO IV CONTRAST;  6/20/2024  3:28 pm      INDICATION:  Signs/Symptoms:Fall over 65; Signs/Symptoms:Fall with back pain and  pain radiating into right leg.      COMPARISON:  None.      ACCESSION NUMBER(S):  [id number]; [id number]; [id number]; [id number]      ORDERING CLINICIAN:  ZEB STEWART      TECHNIQUE:  Axial noncontrast CT images of head with coronal and sagittal  reconstructed images. Axial noncontrast CT images of the cervical  spine thoracic and lumbar spine with coronal and sagittal  reconstructed images.      FINDINGS:  CT HEAD:      BRAIN PARENCHYMA:  No evidence of acute intraparenchymal hemorrhage  or parenchymal evidence of acute large territory ischemic infarct. No  mass-effect, midline shift or effacement of cerebral sulci.  Gray-white matter distinction is preserved. Mild global volume loss      VENTRICLES and EXTRA-AXIAL SPACES:  No acute extra-axial or  intraventricular hemorrhage. Ventricles and sulci are age-concordant.      PARANASAL SINUSES/MASTOIDS:  No hemorrhage or air-fluid levels within  the visualized paranasal sinuses. The mastoid air cells are  well-aerated.      CALVARIUM/ORBITS:  No skull fracture.  The orbits and globes are  intact to the extent visualized.      EXTRACRANIAL SOFT TISSUES: No discernible abnormality.          CT CERVICAL SPINE:      PREVERTEBRAL SOFT TISSUES: Within normal limits.      CRANIOCERVICAL JUNCTION: Intact.      ALIGNMENT:  No traumatic malalignment or traumatic facet widening.      VERTEBRAE:  No acute fracture. Vertebral body heights are maintained.      Mild-to-moderate multilevel degenerative disc disease most notable in  the lower cervical spine.      Thoracic spine: Mild wedging of the midthoracic spine; age  indeterminate. I can not categorically exclude acute compression  although this is felt to be more chronic appearing.  Scattered  degenerative changes.      Evaluation of the lumbar spine shows severe degenerative disc disease  with pars defect at L5-S1 with grade 1 anterolisthesis. Severe  degenerative disc disease also noted L4-L5. These findings appear to  be chronic. No evidence of acute fracture of the lumbar spine.      Impression: CT HEAD:  No acute intracranial abnormality or calvarial fracture.  Senescent change      CT CERVICAL SPINE:  No acute fracture or traumatic malalignment of the cervical spine.  Mild multilevel degenerative disc disease.      Lumbar spine: Grade 1 anterolisthesis L5-S1 with pars defects and  severe degenerative changes. Marked degenerative changes also noted  L4-L5. No evidence of acute lumbar spine fracture.      Thoracic spine: Scattered degenerative changes in the thoracic spine  with slight wedging. All of these findings of felt to be chronic. If  there is ongoing focal mid back pain, thoracic spine MRI could add  additional diagnostic information      Signed by: Kermit Chakraborty 6/20/2024 3:42 PM  Dictation workstation:   [id number]  XR hip right with pelvis when performed 2 or 3 views  Narrative: Interpreted By:  Gallo Arcos,   STUDY:  XR HIP RIGHT WITH PELVIS WHEN PERFORMED 2 OR 3 VIEWS;  6/20/2024 2:59  pm      INDICATION:  Signs/Symptoms:Fall with right hip pain..      COMPARISON:  None.      ACCESSION NUMBER(S):  [id number]      ORDERING CLINICIAN:  ZEB STEWART      FINDINGS:  Bony structures:  Intact      Joint spaces:  Maintained      Soft tissues:  Unremarkable without significant edema or radiodense  foreign body      Other:  Spondylosis of the included lumbar spine particularly at the  L5-S1 level.      Impression: No acute findings.      MACRO:  None      Signed by: Gallo Arcos 6/20/2024 3:37 PM  Dictation workstation:   [id number]      Physical Exam  General:  Pleasant and cooperative. No apparent distress.  HEENT:  Normocephalic, atraumatic, mucus membranes moist.   Neck:   Trachea midline.  No JVD.    Chest:  Clear to auscultation bilaterally. No wheezes, rales, or rhonchi.  CV:  Regular rate and rhythm.  Positive S1/S2.   Abdomen: Bowel sounds present in all four quadrants, abdomen is soft, non-tender, non-distended.  Extremities:  No lower extremity edema or cyanosis.   Neurological:  AAOx3. No focal deficits.  Skin:  Warm and dry.      Assessment/Plan   Active Problems:    Generalized weakness    #Fall  #Generalized weakness  #Leukocytosis, resolved  -CT did not show acute fracture or abnormality.  Patient reports improvement in pain and is able to bear more weight on his leg.    -PT/OT   -Discharge planning for SNF     Chronic:  CAD  Cerebral palsy  -Continue home meds     DVT prophylaxis: Lovenox  CODE STATUS: DNR/DNI    Woody Del Toro DO

## 2024-06-27 LAB
ANION GAP SERPL CALC-SCNC: 11 MMOL/L (ref 10–20)
BUN SERPL-MCNC: 24 MG/DL (ref 6–23)
CALCIUM SERPL-MCNC: 9 MG/DL (ref 8.6–10.3)
CHLORIDE SERPL-SCNC: 103 MMOL/L (ref 98–107)
CO2 SERPL-SCNC: 27 MMOL/L (ref 21–32)
CREAT SERPL-MCNC: 0.95 MG/DL (ref 0.5–1.3)
EGFRCR SERPLBLD CKD-EPI 2021: 83 ML/MIN/1.73M*2
ERYTHROCYTE [DISTWIDTH] IN BLOOD BY AUTOMATED COUNT: 11.9 % (ref 11.5–14.5)
GLUCOSE SERPL-MCNC: 93 MG/DL (ref 74–99)
HCT VFR BLD AUTO: 45 % (ref 41–52)
HGB BLD-MCNC: 15.7 G/DL (ref 13.5–17.5)
MCH RBC QN AUTO: 33.9 PG (ref 26–34)
MCHC RBC AUTO-ENTMCNC: 34.9 G/DL (ref 32–36)
MCV RBC AUTO: 97 FL (ref 80–100)
NRBC BLD-RTO: 0 /100 WBCS (ref 0–0)
PLATELET # BLD AUTO: 212 X10*3/UL (ref 150–450)
POTASSIUM SERPL-SCNC: 4.3 MMOL/L (ref 3.5–5.3)
RBC # BLD AUTO: 4.63 X10*6/UL (ref 4.5–5.9)
SODIUM SERPL-SCNC: 137 MMOL/L (ref 136–145)
WBC # BLD AUTO: 8.6 X10*3/UL (ref 4.4–11.3)

## 2024-06-27 PROCEDURE — 96372 THER/PROPH/DIAG INJ SC/IM: CPT

## 2024-06-27 PROCEDURE — 80048 BASIC METABOLIC PNL TOTAL CA: CPT | Performed by: INTERNAL MEDICINE

## 2024-06-27 PROCEDURE — 85027 COMPLETE CBC AUTOMATED: CPT | Performed by: INTERNAL MEDICINE

## 2024-06-27 PROCEDURE — G0378 HOSPITAL OBSERVATION PER HR: HCPCS

## 2024-06-27 PROCEDURE — 36415 COLL VENOUS BLD VENIPUNCTURE: CPT | Performed by: INTERNAL MEDICINE

## 2024-06-27 PROCEDURE — 2500000004 HC RX 250 GENERAL PHARMACY W/ HCPCS (ALT 636 FOR OP/ED)

## 2024-06-27 PROCEDURE — 99231 SBSQ HOSP IP/OBS SF/LOW 25: CPT | Performed by: INTERNAL MEDICINE

## 2024-06-27 PROCEDURE — 2500000001 HC RX 250 WO HCPCS SELF ADMINISTERED DRUGS (ALT 637 FOR MEDICARE OP)

## 2024-06-27 ASSESSMENT — COGNITIVE AND FUNCTIONAL STATUS - GENERAL
WALKING IN HOSPITAL ROOM: A LITTLE
HELP NEEDED FOR BATHING: A LITTLE
TOILETING: A LITTLE
MOBILITY SCORE: 18
DRESSING REGULAR UPPER BODY CLOTHING: A LITTLE
DAILY ACTIVITIY SCORE: 19
MOVING TO AND FROM BED TO CHAIR: A LITTLE
PERSONAL GROOMING: A LITTLE
CLIMB 3 TO 5 STEPS WITH RAILING: TOTAL
STANDING UP FROM CHAIR USING ARMS: A LITTLE
DRESSING REGULAR LOWER BODY CLOTHING: A LITTLE

## 2024-06-27 ASSESSMENT — PAIN SCALES - GENERAL
PAINLEVEL_OUTOF10: 0 - NO PAIN

## 2024-06-27 NOTE — CARE PLAN
The patient's goals for the shift include  sleep comfort and safety    The clinical goals for the shift include patient will remain safe and free from injury or falls for entire shift.

## 2024-06-27 NOTE — CARE PLAN
The patient's goals for the shift include      The clinical goals for the shift include Patient will remain free from falls for entire shift.

## 2024-06-27 NOTE — CONSULTS
"Nutrition Initial Assessment:   Nutrition Assessment    Reason for Assessment: Length of stay    Patient is a 74 y.o. male presenting with falls      Nutrition History:  Food and Nutrient History: Pt has been eating well. He said that he has been very thin for his entire life.  He said that 132 lbs was his max when he was young.  Although records indicate he was 134 lbs in May 2019.  Records also indicate he is down 4 lbs over the past 4 months.  3%.  Food Allergies/Intolerances:  None  GI Symptoms: None  Oral Problems: None       Anthropometrics:  Height: 172.7 cm (5' 8\")   Weight: 54.4 kg (120 lb)   BMI (Calculated): 18.25  IBW/kg (Dietitian Calculated): 70 kg  Percent of IBW: 78 %       Weight History:   Wt Readings from Last 10 Encounters:   06/20/24 54.4 kg (120 lb)   02/13/24 56.4 kg (124 lb 6.4 oz)   01/22/24 53.8 kg (118 lb 9.7 oz)   11/09/23 53.8 kg (118 lb 9.6 oz)   08/16/22 55.5 kg (122 lb 4 oz)   06/24/22 55.4 kg (122 lb 2 oz)   01/07/21 54.5 kg (120 lb 4 oz)   05/20/19 61.2 kg (134 lb 14.7 oz)         Weight Change %:  Weight History / % Weight Change: Pt said that his max wt was 132 lbs when he was very young.  Records indicate he was 134 lbs in May 2019.  He is down 4 lbs in 4 months per records  3%.  Pt said that he has always been a big eater, especially of sweets.  He does not like vegetables very much    Nutrition Focused Physical Exam Findings:    Subcutaneous Fat Loss:   Orbital Fat Pads: Mild-Moderate (slight dark circles and slight hollowing)  Buccal Fat Pads: Well nourished (full, rounded cheeks)  Triceps: Mild-Moderate (less than ample fat tissue)  Muscle Wasting:  Temporalis: Mild-Moderate (slight depression)  Pectoralis (Clavicular Region): Mild-Moderate (some protrusion of clavicle)  Interosseous: Mild-Moderate (slightly depressed area between thumb and forefinger)  Trapezius/Infraspinatus/Supraspinatus (Scapular Region): Mild-Moderate (slight protrusion of scapula)  Edema:     Physical " Findings:       Nutrition Significant Labs:  BMP Trend:   Results from last 7 days   Lab Units 06/27/24  0833   GLUCOSE mg/dL 93   CALCIUM mg/dL 9.0   SODIUM mmol/L 137   POTASSIUM mmol/L 4.3   CO2 mmol/L 27   CHLORIDE mmol/L 103   BUN mg/dL 24*   CREATININE mg/dL 0.95        Nutrition Specific Medications:  Lipitor; lovenox; metamucil     I/O:    ;      Dietary Orders (From admission, onward)       Start     Ordered    06/20/24 1738  Adult diet Regular  Diet effective now        Question:  Diet type  Answer:  Regular    06/20/24 1737                     Estimated Needs:      Method for Estimating Needs: 6449-3004  26-30 michael kg of IBW     Method for Estimating Needs: 70-84  1-1.2 gm kg of IBW     Method for Estimating Needs: 6763-2259  20-30 ml kg of IBW as medically indicated        Nutrition Diagnosis        Nutrition Diagnosis  Patient has Nutrition Diagnosis: Yes  Diagnosis Status (1): Ongoing  Nutrition Diagnosis 1: Underweight  Related to (1): Altered absorption or metabolism of nutrients  As Evidenced by (1): Pt has eaten excessively all of his life and has maintained an underweight habitus  Additional Assessment Information (1): Pt's food recall reveals ample amounts of food taken in throughout his day       Nutrition Interventions/Recommendations         Nutrition Prescription:  Individualized Nutrition Prescription Provided for : Continue regular diet,  sending ensure plus high protein to help meet nutritional needs        Nutrition Interventions:   Interventions: Meals and snacks, Medical food supplement  Goal: >75% of meals  Medical Food Supplement: Commercial beverage  Goal: >75% of supplement    Collaboration and Referral of Nutrition Care: Collaboration by nutrition professional with other providers    Nutrition Education:      Pt realizes he needs to eat and feels he consistently eats well    Nutrition Monitoring and Evaluation   Food/Nutrient Related History Monitoring  Monitoring and Evaluation  Plan: Energy intake, Fluid intake, Amount of food  Criteria: >75% of energy needs  Criteria: adequate fluid intake without fluid overload  Additional Plans: >75% of meals    Body Composition/Growth/Weight History  Monitoring and Evaluation Plan: Weight    Biochemical Data, Medical Tests and Procedures  Monitoring and Evaluation Plan: Electrolyte/renal panel, Glucose/endocrine profile  Criteria: stable BMP  Criteria: glucose within desired range              Time Spent (min): 45 minutes

## 2024-06-27 NOTE — PROGRESS NOTES
Josh Kessler is a 74 y.o. male on day 0 of admission presenting with Fall at home, initial encounter.      Subjective   Patient reports feeling well with no new complaints.  Discharge planning to SNF pending PASSR       Objective     Last Recorded Vitals  /68 (BP Location: Right arm, Patient Position: Sitting)   Pulse 66   Temp 36.5 °C (97.7 °F) (Temporal)   Resp 18   Wt 54.4 kg (120 lb)   SpO2 95%   Intake/Output last 3 Shifts:  No intake or output data in the 24 hours ending 06/27/24 1400      Admission Weight  Weight: 54.4 kg (120 lb) (06/20/24 1439)    Daily Weight  06/20/24 : 54.4 kg (120 lb)    Image Results  CT femur right wo IV contrast  Narrative: Interpreted By:  Jacobo Haywood,   STUDY:  CT FEMUR RIGHT WO IV CONTRAST;  6/20/2024 6:44 pm      INDICATION:  Signs/Symptoms:Pain, non weight bearing.      COMPARISON:  None.      ACCESSION NUMBER(S):  [id number]      ORDERING CLINICIAN:  TRACY SANFORD      TECHNIQUE:  Contiguous axial images of the right femur were obtained without  intravenous contrast. Coronal and sagittal reformatted images were  obtained from the axial images.      FINDINGS:  No evidence acute fracture dislocation of the right hip. No evidence  of acute displaced right femur fracture. There is right knee  osteoarthrosis with narrowing of the lateral compartment and marginal  osseous spurring. There is patellofemoral osteoarthrosis with joint  space narrowing and osseous spurring. No evidence of significant knee  effusion. Vascular calcifications.      Impression: No evidence of acute displaced right femur fracture.      MACRO:  None      Signed by: Jacobo Haywood 6/20/2024 7:43 PM  Dictation workstation:   [id number]  CT cervical spine wo IV contrast, CT head wo IV contrast, CT thoracic spine wo IV contrast, CT lumbar spine wo IV contrast  Narrative: Interpreted By:  Kermit Chakraborty,   STUDY:  CT HEAD WO IV CONTRAST; CT CERVICAL SPINE WO IV CONTRAST; CT LUMBAR  SPINE WO IV  CONTRAST; CT THORACIC SPINE WO IV CONTRAST;  6/20/2024  3:28 pm      INDICATION:  Signs/Symptoms:Fall over 65; Signs/Symptoms:Fall with back pain and  pain radiating into right leg.      COMPARISON:  None.      ACCESSION NUMBER(S):  [id number]; [id number]; [id number]; [id number]      ORDERING CLINICIAN:  ZEB STEWART      TECHNIQUE:  Axial noncontrast CT images of head with coronal and sagittal  reconstructed images. Axial noncontrast CT images of the cervical  spine thoracic and lumbar spine with coronal and sagittal  reconstructed images.      FINDINGS:  CT HEAD:      BRAIN PARENCHYMA:  No evidence of acute intraparenchymal hemorrhage  or parenchymal evidence of acute large territory ischemic infarct. No  mass-effect, midline shift or effacement of cerebral sulci.  Gray-white matter distinction is preserved. Mild global volume loss      VENTRICLES and EXTRA-AXIAL SPACES:  No acute extra-axial or  intraventricular hemorrhage. Ventricles and sulci are age-concordant.      PARANASAL SINUSES/MASTOIDS:  No hemorrhage or air-fluid levels within  the visualized paranasal sinuses. The mastoid air cells are  well-aerated.      CALVARIUM/ORBITS:  No skull fracture.  The orbits and globes are  intact to the extent visualized.      EXTRACRANIAL SOFT TISSUES: No discernible abnormality.          CT CERVICAL SPINE:      PREVERTEBRAL SOFT TISSUES: Within normal limits.      CRANIOCERVICAL JUNCTION: Intact.      ALIGNMENT:  No traumatic malalignment or traumatic facet widening.      VERTEBRAE:  No acute fracture. Vertebral body heights are maintained.      Mild-to-moderate multilevel degenerative disc disease most notable in  the lower cervical spine.      Thoracic spine: Mild wedging of the midthoracic spine; age  indeterminate. I can not categorically exclude acute compression  although this is felt to be more chronic appearing. Scattered  degenerative changes.      Evaluation of the lumbar spine shows severe  degenerative disc disease  with pars defect at L5-S1 with grade 1 anterolisthesis. Severe  degenerative disc disease also noted L4-L5. These findings appear to  be chronic. No evidence of acute fracture of the lumbar spine.      Impression: CT HEAD:  No acute intracranial abnormality or calvarial fracture.  Senescent change      CT CERVICAL SPINE:  No acute fracture or traumatic malalignment of the cervical spine.  Mild multilevel degenerative disc disease.      Lumbar spine: Grade 1 anterolisthesis L5-S1 with pars defects and  severe degenerative changes. Marked degenerative changes also noted  L4-L5. No evidence of acute lumbar spine fracture.      Thoracic spine: Scattered degenerative changes in the thoracic spine  with slight wedging. All of these findings of felt to be chronic. If  there is ongoing focal mid back pain, thoracic spine MRI could add  additional diagnostic information      Signed by: Kermit Chakraborty 6/20/2024 3:42 PM  Dictation workstation:   [id number]  XR hip right with pelvis when performed 2 or 3 views  Narrative: Interpreted By:  Gallo Arcos,   STUDY:  XR HIP RIGHT WITH PELVIS WHEN PERFORMED 2 OR 3 VIEWS;  6/20/2024 2:59  pm      INDICATION:  Signs/Symptoms:Fall with right hip pain..      COMPARISON:  None.      ACCESSION NUMBER(S):  [id number]      ORDERING CLINICIAN:  ZEB STEWART      FINDINGS:  Bony structures:  Intact      Joint spaces:  Maintained      Soft tissues:  Unremarkable without significant edema or radiodense  foreign body      Other:  Spondylosis of the included lumbar spine particularly at the  L5-S1 level.      Impression: No acute findings.      MACRO:  None      Signed by: Gallo Arcos 6/20/2024 3:37 PM  Dictation workstation:   [id number]      Physical Exam  General:  Pleasant and cooperative. No apparent distress.  HEENT:  Normocephalic, atraumatic, mucus membranes moist.   Neck:  Trachea midline.  No JVD.    Chest:  Clear to auscultation bilaterally. No  wheezes, rales, or rhonchi.  CV:  Regular rate and rhythm.  Positive S1/S2.   Abdomen: Bowel sounds present in all four quadrants, abdomen is soft, non-tender, non-distended.  Extremities:  No lower extremity edema or cyanosis.   Neurological:  AAOx3. No focal deficits.  Skin:  Warm and dry.      Assessment/Plan   Active Problems:    Generalized weakness    #Fall  #Generalized weakness  #Leukocytosis, resolved  -CT did not show acute fracture or abnormality.  Patient reports improvement in pain and is able to bear more weight on his leg.    -PT/OT   -Discharge planning for SNF pending PASSR     Chronic:  CAD  Cerebral palsy  -Continue home meds     DVT prophylaxis: Lovenox  CODE STATUS: DNR/DNI    Woody Del Toro DO

## 2024-06-28 VITALS
DIASTOLIC BLOOD PRESSURE: 75 MMHG | BODY MASS INDEX: 18.19 KG/M2 | HEIGHT: 68 IN | RESPIRATION RATE: 15 BRPM | HEART RATE: 72 BPM | WEIGHT: 120 LBS | TEMPERATURE: 97.2 F | SYSTOLIC BLOOD PRESSURE: 115 MMHG | OXYGEN SATURATION: 95 %

## 2024-06-28 PROCEDURE — 97110 THERAPEUTIC EXERCISES: CPT | Mod: GP,CQ

## 2024-06-28 PROCEDURE — 97116 GAIT TRAINING THERAPY: CPT | Mod: GP,CQ

## 2024-06-28 PROCEDURE — 97530 THERAPEUTIC ACTIVITIES: CPT | Mod: GP,CQ

## 2024-06-28 PROCEDURE — G0378 HOSPITAL OBSERVATION PER HR: HCPCS

## 2024-06-28 PROCEDURE — 97535 SELF CARE MNGMENT TRAINING: CPT | Mod: CO,GO

## 2024-06-28 PROCEDURE — 99231 SBSQ HOSP IP/OBS SF/LOW 25: CPT | Performed by: INTERNAL MEDICINE

## 2024-06-28 PROCEDURE — 2500000001 HC RX 250 WO HCPCS SELF ADMINISTERED DRUGS (ALT 637 FOR MEDICARE OP)

## 2024-06-28 ASSESSMENT — PAIN SCALES - GENERAL
PAINLEVEL_OUTOF10: 0 - NO PAIN
PAINLEVEL_OUTOF10: 4

## 2024-06-28 ASSESSMENT — COGNITIVE AND FUNCTIONAL STATUS - GENERAL
MOBILITY SCORE: 18
MOVING TO AND FROM BED TO CHAIR: A LITTLE
CLIMB 3 TO 5 STEPS WITH RAILING: TOTAL
HELP NEEDED FOR BATHING: A LITTLE
PERSONAL GROOMING: A LITTLE
DAILY ACTIVITIY SCORE: 20
DRESSING REGULAR LOWER BODY CLOTHING: A LITTLE
TOILETING: A LITTLE
STANDING UP FROM CHAIR USING ARMS: A LITTLE
WALKING IN HOSPITAL ROOM: A LITTLE

## 2024-06-28 ASSESSMENT — ACTIVITIES OF DAILY LIVING (ADL): HOME_MANAGEMENT_TIME_ENTRY: 23

## 2024-06-28 ASSESSMENT — PAIN - FUNCTIONAL ASSESSMENT
PAIN_FUNCTIONAL_ASSESSMENT: 0-10
PAIN_FUNCTIONAL_ASSESSMENT: 0-10

## 2024-06-28 NOTE — CARE PLAN
The patient's goals for the shift include  sleep, comfort and safety    The clinical goals for the shift include patient will remain safe and free from injury for entire shift.

## 2024-06-28 NOTE — PROGRESS NOTES
24 1454   Discharge Planning   Living Arrangements Spouse/significant other   Support Systems Friends/neighbors   Assistance Needed Pt states was previously Independent with walker   Type of Residence Private residence   Number of Stairs to Enter Residence 4   Home or Post Acute Services None   Patient expects to be discharged to: [facility]   Does the patient need discharge transport arranged? Yes   RoundTrip coordination needed? Yes     TCC Note: Pt's Level II came back from Tripped PASRR screen. Unfortunately, his precert  while waiting for that to come back and had to be re-started. Requested that our team please re-start precert. Pt ready to admit pending that precert. RUSSELL Shore, RN, TCC.    ADDENDUM: Level II determination came back approved however, precert  while waiting. Requested our precert team to please start another, which was expedited and came back quickly. Confirming bed availability at [facility]. Requested MD completed needed documents. Will request a transport time be set up by [facility]- wheelchair and Room Air. Pt should admit there today pending a bed. RUSSELL Shore, RN, TCC.

## 2024-06-28 NOTE — PROGRESS NOTES
Physical Therapy    Physical Therapy Treatment    Patient Name: Josh Kessler  MRN: [id number]  Today's Date: 6/28/2024  Time Calculation  Start Time: 0938  Stop Time: 1020  Time Calculation (min): 42 min    Assessment/Plan   PT Assessment  End of Session Communication: Bedside nurse  End of Session Patient Position: Up in chair, Alarm on (Call bell in reach.)     PT Plan  Treatment/Interventions: Bed mobility, Transfer training, Gait training, Balance training, Neuromuscular re-education, Strengthening, Endurance training, Therapeutic exercise, Therapeutic activity  PT Plan: Ongoing PT  PT Frequency: 4 times per week  PT Discharge Recommendations: Moderate intensity level of continued care  PT - OK to Discharge: Yes      General Visit Information:   PT  Visit  PT Received On: 06/28/24  General  Prior to Session Communication: Bedside nurse  Patient Position Received: Alarm off, not on at start of session, Bed, 1 rail up (Seated EOB upon arrival.)  General Comment:  (Pt pleasant and willing to participate in therapy session. Shoes donned throughout the session.)    Subjective   Precautions:  Precautions  Medical Precautions: Fall precautions  Precautions Comment: falls, R hip pain, CP, OOB with assist  Vital Signs:       Objective   Pain:  Pain Assessment  Pain Assessment: 0-10  0-10 (Numeric) Pain Score: 4  Pain Location: Hip  Pain Orientation: Right  Pain Interventions:  (No interventions needed at this time.)              Treatments:  Therapeutic Exercise  Therapeutic Exercise Performed: Yes (Pt performed seated BLE ther ex consisting of heel/toe raises, marching, LAQ, HS, GS, hip ADD with pillow, ABD x20 reps.)    Bed Mobility  Bed Mobility: Yes (Pt performed sup>sit with Mod I.)    Ambulation/Gait Training  Ambulation/Gait Training Performed: Yes (Pt able to amb 15ft x2 trials with CGA/SBA and FWW for support; presents amb on toes with heels raised and NBOS with scissoring. No dizziness or LOB  noted.)  Transfers  Transfer: Yes (Pt performed sit<>stand with FWW and SBA.)    Outcome Measures:  [facility] Basic Mobility  Turning from your back to your side while in a flat bed without using bedrails: None  Moving from lying on your back to sitting on the side of a flat bed without using bedrails: None  Moving to and from bed to chair (including a wheelchair): A little  Standing up from a chair using your arms (e.g. wheelchair or bedside chair): A little  To walk in hospital room: A little  Climbing 3-5 steps with railing: Total  Basic Mobility - Total Score: 18    Education Documentation  Mobility Training, taught by Troy Charles PTA at 6/28/2024 10:52 AM.  Learner: Patient  Readiness: Acceptance  Method: Explanation  Response: Verbalizes Understanding    Education Comments  No comments found.        OP EDUCATION:       Encounter Problems       Encounter Problems (Active)       PT Problem       STG - Pt will transition supine <> sitting with mod I (Met)       Start:  06/21/24    Expected End:  07/05/24    Resolved:  06/26/24         STG - Pt will transfer STS with mod I (Progressing)       Start:  06/21/24    Expected End:  07/05/24            STG - Pt will amb >/=50' using WW with SBA (Progressing)       Start:  06/21/24    Expected End:  07/05/24            STG - Pt will maintain F+ dynamic standing balance to decrease risk of falls (Progressing)       Start:  06/21/24    Expected End:  07/05/24               Safety       LTG - Patient will adhere to hip precautions during ADL's and transfers (Progressing)       Start:  06/20/24            LTG - Patient will demonstrate safety requirements appropriate to situation/environment (Progressing)       Start:  06/20/24            LTG - Patient will utilize safety techniques (Progressing)       Start:  06/20/24            STG - Patient locks brakes on wheelchair (Progressing)       Start:  06/20/24            STG - Patient uses call light consistently to request  assistance with transfers (Progressing)       Start:  06/20/24

## 2024-06-28 NOTE — CARE PLAN
The patient's goals for the shift include  increase strength    The clinical goals for the shift include Patient will remain free from falls throughout the shift    Over the shift, the patient did not make progress toward the following goals. Barriers to progression include acute illness. Recommendations to address these barriers include increased ambulation and communication.

## 2024-06-28 NOTE — PROGRESS NOTES
Josh Kessler is a 74 y.o. male on day 0 of admission presenting with Fall at home, initial encounter.      Subjective   Patient reports feeling well with no new complaints.  Discharge planning to SNF pending PASSR       Objective     Last Recorded Vitals  /84 (Patient Position: Sitting)   Pulse 74   Temp 36.4 °C (97.5 °F)   Resp 16   Wt 54.4 kg (120 lb)   SpO2 97%   Intake/Output last 3 Shifts:    Intake/Output Summary (Last 24 hours) at 6/28/2024 1353  Last data filed at 6/28/2024 0921  Gross per 24 hour   Intake 590 ml   Output 550 ml   Net 40 ml         Admission Weight  Weight: 54.4 kg (120 lb) (06/20/24 1439)    Daily Weight  06/20/24 : 54.4 kg (120 lb)    Image Results  CT femur right wo IV contrast  Narrative: Interpreted By:  Jacobo Haywood,   STUDY:  CT FEMUR RIGHT WO IV CONTRAST;  6/20/2024 6:44 pm      INDICATION:  Signs/Symptoms:Pain, non weight bearing.      COMPARISON:  None.      ACCESSION NUMBER(S):  [id number]      ORDERING CLINICIAN:  TRACY SANFORD      TECHNIQUE:  Contiguous axial images of the right femur were obtained without  intravenous contrast. Coronal and sagittal reformatted images were  obtained from the axial images.      FINDINGS:  No evidence acute fracture dislocation of the right hip. No evidence  of acute displaced right femur fracture. There is right knee  osteoarthrosis with narrowing of the lateral compartment and marginal  osseous spurring. There is patellofemoral osteoarthrosis with joint  space narrowing and osseous spurring. No evidence of significant knee  effusion. Vascular calcifications.      Impression: No evidence of acute displaced right femur fracture.      MACRO:  None      Signed by: Jacobo Haywood 6/20/2024 7:43 PM  Dictation workstation:   [id number]  CT cervical spine wo IV contrast, CT head wo IV contrast, CT thoracic spine wo IV contrast, CT lumbar spine wo IV contrast  Narrative: Interpreted By:  Kermit Chakraborty,   STUDY:  CT HEAD WO IV  CONTRAST; CT CERVICAL SPINE WO IV CONTRAST; CT LUMBAR  SPINE WO IV CONTRAST; CT THORACIC SPINE WO IV CONTRAST;  6/20/2024  3:28 pm      INDICATION:  Signs/Symptoms:Fall over 65; Signs/Symptoms:Fall with back pain and  pain radiating into right leg.      COMPARISON:  None.      ACCESSION NUMBER(S):  [id number]; [id number]; [id number]; [id number]      ORDERING CLINICIAN:  ZEB STEWART      TECHNIQUE:  Axial noncontrast CT images of head with coronal and sagittal  reconstructed images. Axial noncontrast CT images of the cervical  spine thoracic and lumbar spine with coronal and sagittal  reconstructed images.      FINDINGS:  CT HEAD:      BRAIN PARENCHYMA:  No evidence of acute intraparenchymal hemorrhage  or parenchymal evidence of acute large territory ischemic infarct. No  mass-effect, midline shift or effacement of cerebral sulci.  Gray-white matter distinction is preserved. Mild global volume loss      VENTRICLES and EXTRA-AXIAL SPACES:  No acute extra-axial or  intraventricular hemorrhage. Ventricles and sulci are age-concordant.      PARANASAL SINUSES/MASTOIDS:  No hemorrhage or air-fluid levels within  the visualized paranasal sinuses. The mastoid air cells are  well-aerated.      CALVARIUM/ORBITS:  No skull fracture.  The orbits and globes are  intact to the extent visualized.      EXTRACRANIAL SOFT TISSUES: No discernible abnormality.          CT CERVICAL SPINE:      PREVERTEBRAL SOFT TISSUES: Within normal limits.      CRANIOCERVICAL JUNCTION: Intact.      ALIGNMENT:  No traumatic malalignment or traumatic facet widening.      VERTEBRAE:  No acute fracture. Vertebral body heights are maintained.      Mild-to-moderate multilevel degenerative disc disease most notable in  the lower cervical spine.      Thoracic spine: Mild wedging of the midthoracic spine; age  indeterminate. I can not categorically exclude acute compression  although this is felt to be more chronic appearing. Scattered  degenerative  changes.      Evaluation of the lumbar spine shows severe degenerative disc disease  with pars defect at L5-S1 with grade 1 anterolisthesis. Severe  degenerative disc disease also noted L4-L5. These findings appear to  be chronic. No evidence of acute fracture of the lumbar spine.      Impression: CT HEAD:  No acute intracranial abnormality or calvarial fracture.  Senescent change      CT CERVICAL SPINE:  No acute fracture or traumatic malalignment of the cervical spine.  Mild multilevel degenerative disc disease.      Lumbar spine: Grade 1 anterolisthesis L5-S1 with pars defects and  severe degenerative changes. Marked degenerative changes also noted  L4-L5. No evidence of acute lumbar spine fracture.      Thoracic spine: Scattered degenerative changes in the thoracic spine  with slight wedging. All of these findings of felt to be chronic. If  there is ongoing focal mid back pain, thoracic spine MRI could add  additional diagnostic information      Signed by: Kermit Chakraborty 6/20/2024 3:42 PM  Dictation workstation:   [id number]  XR hip right with pelvis when performed 2 or 3 views  Narrative: Interpreted By:  Gallo Arcos,   STUDY:  XR HIP RIGHT WITH PELVIS WHEN PERFORMED 2 OR 3 VIEWS;  6/20/2024 2:59  pm      INDICATION:  Signs/Symptoms:Fall with right hip pain..      COMPARISON:  None.      ACCESSION NUMBER(S):  [id number]      ORDERING CLINICIAN:  ZEB STEWART      FINDINGS:  Bony structures:  Intact      Joint spaces:  Maintained      Soft tissues:  Unremarkable without significant edema or radiodense  foreign body      Other:  Spondylosis of the included lumbar spine particularly at the  L5-S1 level.      Impression: No acute findings.      MACRO:  None      Signed by: Gallo Arcos 6/20/2024 3:37 PM  Dictation workstation:   [id number]      Physical Exam  General:  Pleasant and cooperative. No apparent distress.  HEENT:  Normocephalic, atraumatic, mucus membranes moist.   Neck:  Trachea midline.  No  JVD.    Chest:  Clear to auscultation bilaterally. No wheezes, rales, or rhonchi.  CV:  Regular rate and rhythm.  Positive S1/S2.   Abdomen: Bowel sounds present in all four quadrants, abdomen is soft, non-tender, non-distended.  Extremities:  No lower extremity edema or cyanosis.   Neurological:  AAOx3. No focal deficits.  Skin:  Warm and dry.      Assessment/Plan   Active Problems:    Generalized weakness    #Fall  #Generalized weakness  #Leukocytosis, resolved  -CT did not show acute fracture or abnormality.  Patient reports improvement in pain and is able to bear more weight on his leg.    -PT/OT   -Discharge planning for SNF pending PASSR     Chronic:  CAD  Cerebral palsy  -Continue home meds     DVT prophylaxis: Lovenox  CODE STATUS: DNR/DNI    Woody Del Toro DO

## 2024-06-28 NOTE — PROGRESS NOTES
Occupational Therapy    OT Treatment    Patient Name: Josh Kessler  MRN: [id number]  Today's Date: 6/28/2024  Time Calculation  Start Time: 1325  Stop Time: 1348  Time Calculation (min): 23 min        Assessment:  End of Session Patient Position: Up in chair, Alarm off, not on at start of session     Plan:  Treatment Interventions: ADL retraining, Functional transfer training, UE strengthening/ROM, Endurance training, Equipment evaluation/education, Patient/family training, Compensatory technique education  OT Frequency: 3 times per week  OT Discharge Recommendations: Moderate intensity level of continued care  OT - OK to Discharge: Yes (once medically appropriate to next level of care)  Treatment Interventions: ADL retraining, Functional transfer training, UE strengthening/ROM, Endurance training, Equipment evaluation/education, Patient/family training, Compensatory technique education    Subjective   Previous Visit Info:  OT Last Visit  OT Received On: 06/28/24  General:  General  Prior to Session Communication: Bedside nurse  Patient Position Received: Up in chair, Alarm off, not on at start of session  General Comment: pleasant and cooperative  Precautions:  Medical Precautions: Fall precautions  Precautions Comment: falls, R hip pain, CP, OOB with assist         Objective         Activities of Daily Living: Grooming  Grooming Level of Assistance: Setup  Grooming Where Assessed: Standing sinkside  Grooming Comments: wash hands    Toileting  Toileting Level of Assistance: Close supervision  Where Assessed: Toilet  Functional Standing Tolerance:  Time: 3:00 standing at W  Bed Mobility/Transfers: Transfers  Transfer: Yes  Transfer 1  Technique 1: Sit to stand, Stand to sit  Transfer Device 1: Walker  Transfer Level of Assistance 1: Close supervision  Trials/Comments 1: pt completed sit to stand from chair with arms    Toilet Transfers  Toilet Transfer From: Walker  Toilet Transfer Type: To and from  Toilet  Transfer to: Standard toilet  Toilet Transfer Technique: Ambulating  Toilet Transfers:  (SBA)    Functional Mobility:  Functional Mobility  Functional Mobility Performed: Yes  Functional Mobility 1  Device 1: Rolling walker  Assistance 1:  (SBA-CGA with use of FWW)  Comments 1: pt ambulated at slow, safe pace from chair with arms in/out of bathroom      Outcome Measures:[facility] Daily Activity  Putting on and taking off regular lower body clothing: A little  Bathing (including washing, rinsing, drying): A little  Putting on and taking off regular upper body clothing: None  Toileting, which includes using toilet, bedpan or urinal: A little  Taking care of personal grooming such as brushing teeth: A little  Eating Meals: None  Daily Activity - Total Score: 20        Education Documentation  Body Mechanics, taught by ANNAMARIA Kim at 6/28/2024  2:05 PM.  Learner: Patient  Readiness: Acceptance  Method: Explanation  Response: Verbalizes Understanding, Demonstrated Understanding    ADL Training, taught by ANNAMARIA Kim at 6/28/2024  2:05 PM.  Learner: Patient  Readiness: Acceptance  Method: Explanation  Response: Verbalizes Understanding, Demonstrated Understanding    Education Comments  No comments found.               Goals:  Encounter Problems       Encounter Problems (Active)       OT Goals       STG- patient will complete LB dressing at MOD I with use of ae/ad/dme prn (Progressing)       Start:  06/21/24    Expected End:  07/05/24            STG- patient will complete toileting at MOD I with use of ae/ad/dme prn (Progressing)       Start:  06/21/24    Expected End:  07/05/24            STG- patient will complete transfers to/from bed, chair, commode at MOD I with use of ae/ad/dme prn (Progressing)       Start:  06/21/24    Expected End:  07/05/24            STG- patient will complete simple mobility at MOD I with use of ae/ad/dme prn (Progressing)       Start:  06/21/24    Expected End:  07/05/24             STG- patient will complete grooming at MOD I with use of ae/ad/dme prn (Progressing)       Start:  06/21/24    Expected End:  07/05/24

## 2024-07-01 ENCOUNTER — TELEPHONE (OUTPATIENT)
Dept: PRIMARY CARE | Facility: CLINIC | Age: 74
End: 2024-07-01
Payer: MEDICARE

## 2024-07-02 ENCOUNTER — APPOINTMENT (OUTPATIENT)
Dept: NEUROLOGY | Facility: CLINIC | Age: 74
End: 2024-07-02
Payer: MEDICARE

## 2024-07-15 ENCOUNTER — DOCUMENTATION (OUTPATIENT)
Dept: PRIMARY CARE | Facility: CLINIC | Age: 74
End: 2024-07-15
Payer: MEDICARE

## 2024-07-16 ENCOUNTER — PATIENT OUTREACH (OUTPATIENT)
Dept: CARE COORDINATION | Facility: CLINIC | Age: 74
End: 2024-07-16
Payer: MEDICARE

## 2024-07-16 NOTE — PROGRESS NOTES
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 PECTORIS   CEREBRAL PALSY, UNSPECIFIED   SCIATICA, RIGHT SIDE   SPONDYLOLISTHESIS, LUMBAR REGION   OTHER INTERVERTEBRAL DISC DEGENERATION, LUMBAR REGION   DEVIATED NASAL SEPTUM   BICIPITAL TENDINITIS, RIGHT SHOULDER   BICIPITAL TENDINITIS, LEFT SHOULDER   PRIMARY OSTEOARTHRITIS, RIGHT HAND  UNILATERAL PRIMARY OSTEOARTHRITIS, RIGHT KNEE  UNSPECIFIED PROTEIN-CALORIE MALNUTRITION   HYPERLIPIDEMIA, UNSPECIFIED   BENIGN PROSTATIC HYPERPLASIA WITHOUT LOWER URINARY TRACT SYMPTOMS   BENIGN NEOPLASM OF MAJOR SALIVARY GLAND, UNSPECIFIED  UNSPECIFIED HEARING LOSS, BILATERAL   PERSONAL HISTORY OF TRANSIENT ISCHEMIC ATTACK (TIA), AND CEREBRAL INFARCTION WITHOUT RESIDUAL DEFICITS  REPEATED FALLS    Admission Date:6/28/2024  Discharge Date: 7/12/2024    PCP Appointment Date:TBD   Specialist Appointment Date: N/A  Hospital Encounter and Summary Linked: Yes  PMC: 6/20/2024-6/28/2024  See discharge assessment below for further details  Engagement  Call Start Time: 1104 (7/16/2024 11:12 AM)    Medications  Medications reviewed with patient/caregiver?: Yes (7/16/2024 11:12 AM)  Is the patient having any side effects they believe may be caused by any medication additions or changes?: No (7/16/2024 11:12 AM)  Does the patient have all medications ordered at discharge?: Not applicable (7/16/2024 11:12 AM)  Care Management Interventions: No intervention needed (7/16/2024 11:12 AM)  Prescription Comments: -- (No Medication Change) (7/16/2024 11:12 AM)  Is the patient taking all medications as directed (includes completed medication regime)?: Yes (7/16/2024 11:12 AM)    Appointments  Does the patient have a primary care provider?: Yes (7/16/2024 11:12 AM)  Care Management Interventions: -- (Declined assistance, will schedule self) (7/16/2024 11:12 AM)  Has the patient kept scheduled appointments due by  today?: Yes (7/16/2024 11:12 AM)    Self Management  What is the home health agency?: -- ([facility], will be coming to the home today) (7/16/2024 11:12 AM)  Has home health visited the patient within 72 hours of discharge?: Call prior to 72 hours (7/16/2024 11:12 AM)  What Durable Medical Equipment (DME) was ordered?: -- (Has a fitting scheduled for leg brace, he will contact provider if any issues) (7/16/2024 11:12 AM)    Patient Teaching  Does the patient have access to their discharge instructions?: Yes (7/16/2024 11:12 AM)  What is the patient's perception of their health status since discharge?: Improving (7/16/2024 11:12 AM)  Is the patient/caregiver able to teach back the hierarchy of who to call/visit for symptoms/problems? PCP, Specialist, Home Health nurse, Urgent Care, ED, 911: Yes (7/16/2024 11:12 AM)    Wrap Up  Wrap Up Additional Comments: -- (Josh is doing well since coming home. [facility] to come today. A leg brace was suggested for additional support, he has a fitting scheduled. He will contact provider if any concerns.) (7/16/2024 11:12 AM)

## 2024-07-17 ENCOUNTER — TELEPHONE (OUTPATIENT)
Dept: PRIMARY CARE | Facility: CLINIC | Age: 74
End: 2024-07-17
Payer: MEDICARE

## 2024-07-19 ENCOUNTER — TELEPHONE (OUTPATIENT)
Dept: PRIMARY CARE | Facility: CLINIC | Age: 74
End: 2024-07-19
Payer: MEDICARE

## 2024-07-19 DIAGNOSIS — G80.9 CEREBRAL PALSY, UNSPECIFIED TYPE (MULTI): Primary | ICD-10-CM

## 2024-07-30 ENCOUNTER — PATIENT OUTREACH (OUTPATIENT)
Dept: CARE COORDINATION | Facility: CLINIC | Age: 74
End: 2024-07-30
Payer: MEDICARE

## 2024-07-30 NOTE — PROGRESS NOTES
Call after hospitalization.  At time of outreach call the patient feels as if their condition has improved since last visit.  Josh is doing very well. He went for brace fitting and seems like will be helpful, will get rx from PCP for, he has PCP appt next week.

## 2024-08-06 ENCOUNTER — APPOINTMENT (OUTPATIENT)
Dept: PRIMARY CARE | Facility: CLINIC | Age: 74
End: 2024-08-06
Payer: MEDICARE

## 2024-08-06 VITALS
WEIGHT: 122 LBS | HEIGHT: 68 IN | DIASTOLIC BLOOD PRESSURE: 80 MMHG | OXYGEN SATURATION: 97 % | HEART RATE: 72 BPM | BODY MASS INDEX: 18.49 KG/M2 | SYSTOLIC BLOOD PRESSURE: 138 MMHG | TEMPERATURE: 97.8 F

## 2024-08-06 DIAGNOSIS — G80.9 CEREBRAL PALSY, UNSPECIFIED TYPE (MULTI): Primary | ICD-10-CM

## 2024-08-06 DIAGNOSIS — E78.5 BORDERLINE HYPERLIPIDEMIA: ICD-10-CM

## 2024-08-06 DIAGNOSIS — R53.1 GENERALIZED WEAKNESS: ICD-10-CM

## 2024-08-06 PROCEDURE — 99214 OFFICE O/P EST MOD 30 MIN: CPT | Performed by: FAMILY MEDICINE

## 2024-08-06 PROCEDURE — 1160F RVW MEDS BY RX/DR IN RCRD: CPT | Performed by: FAMILY MEDICINE

## 2024-08-06 PROCEDURE — 1159F MED LIST DOCD IN RCRD: CPT | Performed by: FAMILY MEDICINE

## 2024-08-06 PROCEDURE — 1036F TOBACCO NON-USER: CPT | Performed by: FAMILY MEDICINE

## 2024-08-06 PROCEDURE — 1126F AMNT PAIN NOTED NONE PRSNT: CPT | Performed by: FAMILY MEDICINE

## 2024-08-06 PROCEDURE — 1157F ADVNC CARE PLAN IN RCRD: CPT | Performed by: FAMILY MEDICINE

## 2024-08-06 ASSESSMENT — ENCOUNTER SYMPTOMS
LOSS OF SENSATION IN FEET: 0
BACK PAIN: 1
DEPRESSION: 0
ENDOCRINE NEGATIVE: 1
CONSTITUTIONAL NEGATIVE: 1
RESPIRATORY NEGATIVE: 1

## 2024-08-06 ASSESSMENT — PAIN SCALES - GENERAL: PAINLEVEL: 0-NO PAIN

## 2024-08-06 NOTE — PROGRESS NOTES
"Subjective   Patient ID: Josh Kessler is a 74 y.o. male who presents for FOLLOW UP  (REHAB( BRACE FOR RIGHT LEG)).    HPI     Review of Systems   Constitutional: Negative.    Respiratory: Negative.     Endocrine: Negative.    Genitourinary: Negative.    Musculoskeletal:  Positive for back pain.        Right drop foot with weakness   Neurological:         CP       Objective   /80 (BP Location: Left arm)   Pulse 72   Temp 36.6 °C (97.8 °F) (Temporal)   Ht 1.727 m (5' 8\")   Wt 55.3 kg (122 lb)   SpO2 97%   BMI 18.55 kg/m²     Physical Exam  Vitals and nursing note reviewed.   Constitutional:       Appearance: Normal appearance.   Cardiovascular:      Rate and Rhythm: Regular rhythm.      Heart sounds: Normal heart sounds.   Pulmonary:      Breath sounds: Normal breath sounds.   Musculoskeletal:      Comments: Muscle weakness right leg   Neurological:      Mental Status: He is alert and oriented to person, place, and time.      Comments: Patient has a right dropfoot with weakness   Psychiatric:         Mood and Affect: Mood normal.         Behavior: Behavior normal.         Assessment/Plan patient seen here to follow-up after having been admitted at [facility] from 6 20-6 28 after suffering a fall.  He was then in rehab he is back home now.  We were able to review his hospital stay medications were reviewed pre and post admission.  He is also in need of a custom AFO brace for his right foot secondary to dropfoot.  This is necessary for him to have safe ambulation and be able to transfer.  Problem List Items Addressed This Visit             ICD-10-CM    Borderline hyperlipidemia E78.5    Cerebral palsy (Multi) - Primary G80.9    Generalized weakness R53.1          "

## 2024-08-30 ENCOUNTER — PATIENT OUTREACH (OUTPATIENT)
Dept: PRIMARY CARE | Facility: CLINIC | Age: 74
End: 2024-08-30
Payer: MEDICARE

## 2024-08-30 NOTE — PROGRESS NOTES
Call after hospitalization.  At time of outreach call the patient feels as if their condition has improved since last visit.  Reviewed the PCP appointment with the pt and addressed any questions or concerns.  Josh is doing very well, he was fitted for brace and will get soon. He mentioned that he cannot get appointment until next year for Neuro, discussed to look for new one and let PCP know to place referral if needed.

## 2024-10-11 ENCOUNTER — PATIENT OUTREACH (OUTPATIENT)
Dept: PRIMARY CARE | Facility: CLINIC | Age: 74
End: 2024-10-11
Payer: MEDICARE

## 2024-10-30 ENCOUNTER — TELEPHONE (OUTPATIENT)
Dept: PRIMARY CARE | Facility: CLINIC | Age: 74
End: 2024-10-30
Payer: MEDICARE

## 2024-11-12 ENCOUNTER — APPOINTMENT (OUTPATIENT)
Dept: PRIMARY CARE | Facility: CLINIC | Age: 74
End: 2024-11-12
Payer: MEDICARE

## 2024-11-12 VITALS
HEART RATE: 66 BPM | TEMPERATURE: 97.4 F | HEIGHT: 68 IN | BODY MASS INDEX: 18.79 KG/M2 | DIASTOLIC BLOOD PRESSURE: 70 MMHG | SYSTOLIC BLOOD PRESSURE: 112 MMHG | WEIGHT: 124 LBS | OXYGEN SATURATION: 99 %

## 2024-11-12 DIAGNOSIS — I25.10 ATHEROSCLEROSIS OF NATIVE CORONARY ARTERY OF NATIVE HEART WITHOUT ANGINA PECTORIS: ICD-10-CM

## 2024-11-12 DIAGNOSIS — N40.0 BENIGN PROSTATIC HYPERPLASIA WITHOUT LOWER URINARY TRACT SYMPTOMS: ICD-10-CM

## 2024-11-12 DIAGNOSIS — Z86.73 PERSONAL HISTORY OF TRANSIENT ISCHEMIC ATTACK (TIA), AND CEREBRAL INFARCTION WITHOUT RESIDUAL DEFICITS: ICD-10-CM

## 2024-11-12 DIAGNOSIS — G80.9 CEREBRAL PALSY, UNSPECIFIED TYPE (MULTI): ICD-10-CM

## 2024-11-12 DIAGNOSIS — Z00.00 MEDICARE ANNUAL WELLNESS VISIT, SUBSEQUENT: Primary | ICD-10-CM

## 2024-11-12 DIAGNOSIS — I73.9 PERIPHERAL VASCULAR DISEASE, UNSPECIFIED (CMS-HCC): ICD-10-CM

## 2024-11-12 DIAGNOSIS — M46.96 UNSPECIFIED INFLAMMATORY SPONDYLOPATHY, LUMBAR REGION (CMS-HCC): ICD-10-CM

## 2024-11-12 PROBLEM — R29.6 REPEATED FALLS: Status: ACTIVE | Noted: 2024-06-28

## 2024-11-12 PROBLEM — M43.16 SPONDYLOLISTHESIS, LUMBAR REGION: Status: ACTIVE | Noted: 2024-06-28

## 2024-11-12 PROBLEM — D11.9 BENIGN NEOPLASM OF MAJOR SALIVARY GLAND, UNSPECIFIED: Status: ACTIVE | Noted: 2024-06-28

## 2024-11-12 PROBLEM — M54.31 SCIATICA, RIGHT SIDE: Status: ACTIVE | Noted: 2024-06-28

## 2024-11-12 PROCEDURE — 99497 ADVNCD CARE PLAN 30 MIN: CPT | Performed by: FAMILY MEDICINE

## 2024-11-12 PROCEDURE — 1125F AMNT PAIN NOTED PAIN PRSNT: CPT | Performed by: FAMILY MEDICINE

## 2024-11-12 PROCEDURE — 80053 COMPREHEN METABOLIC PANEL: CPT

## 2024-11-12 PROCEDURE — 80061 LIPID PANEL: CPT

## 2024-11-12 PROCEDURE — G0439 PPPS, SUBSEQ VISIT: HCPCS | Performed by: FAMILY MEDICINE

## 2024-11-12 PROCEDURE — 99397 PER PM REEVAL EST PAT 65+ YR: CPT | Performed by: FAMILY MEDICINE

## 2024-11-12 PROCEDURE — 1160F RVW MEDS BY RX/DR IN RCRD: CPT | Performed by: FAMILY MEDICINE

## 2024-11-12 PROCEDURE — 85025 COMPLETE CBC W/AUTO DIFF WBC: CPT

## 2024-11-12 PROCEDURE — 1159F MED LIST DOCD IN RCRD: CPT | Performed by: FAMILY MEDICINE

## 2024-11-12 PROCEDURE — 1036F TOBACCO NON-USER: CPT | Performed by: FAMILY MEDICINE

## 2024-11-12 PROCEDURE — 1170F FXNL STATUS ASSESSED: CPT | Performed by: FAMILY MEDICINE

## 2024-11-12 PROCEDURE — 1124F ACP DISCUSS-NO DSCNMKR DOCD: CPT | Performed by: FAMILY MEDICINE

## 2024-11-12 PROCEDURE — 84153 ASSAY OF PSA TOTAL: CPT

## 2024-11-12 PROCEDURE — 1157F ADVNC CARE PLAN IN RCRD: CPT | Performed by: FAMILY MEDICINE

## 2024-11-12 RX ORDER — BENZONATATE 200 MG/1
CAPSULE ORAL
COMMUNITY
Start: 2023-10-18 | End: 2024-11-12 | Stop reason: ALTCHOICE

## 2024-11-12 RX ORDER — GUAIFENESIN 600 MG/1
TABLET, EXTENDED RELEASE ORAL
COMMUNITY
Start: 2023-10-18 | End: 2024-11-12 | Stop reason: WASHOUT

## 2024-11-12 ASSESSMENT — ENCOUNTER SYMPTOMS
LOSS OF SENSATION IN FEET: 0
CARDIOVASCULAR NEGATIVE: 1
ENDOCRINE NEGATIVE: 1
PSYCHIATRIC NEGATIVE: 1
FATIGUE: 1
RESPIRATORY NEGATIVE: 1
MYALGIAS: 1
GASTROINTESTINAL NEGATIVE: 1
WEAKNESS: 1
DEPRESSION: 0
OCCASIONAL FEELINGS OF UNSTEADINESS: 0

## 2024-11-12 ASSESSMENT — ACTIVITIES OF DAILY LIVING (ADL)
JUDGMENT_ADEQUATE_SAFELY_COMPLETE_DAILY_ACTIVITIES: YES
PREPARING MEALS: INDEPENDENT
BATHING: INDEPENDENT
GROCERY SHOPPING: INDEPENDENT
TAKING MEDICATION: INDEPENDENT
NEEDS ASSISTANCE WITH FOOD: INDEPENDENT
DRESSING YOURSELF: INDEPENDENT
EATING: INDEPENDENT
TOILETING: INDEPENDENT
MANAGING FINANCES: INDEPENDENT
USING TRANSPORTATION: INDEPENDENT
DOING HOUSEWORK: INDEPENDENT
WALKS IN HOME: INDEPENDENT
USING TELEPHONE: INDEPENDENT
ADEQUATE_TO_COMPLETE_ADL: YES
GROOMING: INDEPENDENT
STIL DRIVING: YES
FEEDING YOURSELF: INDEPENDENT
PATIENT'S MEMORY ADEQUATE TO SAFELY COMPLETE DAILY ACTIVITIES?: YES

## 2024-11-12 ASSESSMENT — ANXIETY QUESTIONNAIRES
5. BEING SO RESTLESS THAT IT IS HARD TO SIT STILL: NOT AT ALL
7. FEELING AFRAID AS IF SOMETHING AWFUL MIGHT HAPPEN: NOT AT ALL
1. FEELING NERVOUS, ANXIOUS, OR ON EDGE: NOT AT ALL
4. TROUBLE RELAXING: NOT AT ALL
6. BECOMING EASILY ANNOYED OR IRRITABLE: NOT AT ALL
GAD7 TOTAL SCORE: 0
3. WORRYING TOO MUCH ABOUT DIFFERENT THINGS: NOT AT ALL
2. NOT BEING ABLE TO STOP OR CONTROL WORRYING: NOT AT ALL

## 2024-11-12 ASSESSMENT — GERIATRIC MINI NUTRITIONAL ASSESSMENT (MNA)
A HAS FOOD INTAKE DECLINED OVER THE PAST 3 MONTHS DUE TO LOSS OF APPETITE, DIGESTIVE PROBLEMS, CHEWING OR SWALLOWING DIFFICULTIES?: NO DECREASE IN FOOD INTAKE
E NEUROPSYCHOLOGICAL PROBLEMS: NO PSYCHOLOGICAL PROBLEMS
B WEIGHT LOSS DURING THE LAST 3 MONTHS: NO WEIGHT LOSS
D HAS SUFFERED PSYCHOLOGICAL STRESS OR ACUTE DISEASE IN THE PAST 3 MONTHS?: NO
C GENERAL MOBILITY: GOES OUT

## 2024-11-12 ASSESSMENT — PAIN SCALES - GENERAL: PAINLEVEL_OUTOF10: 4

## 2024-11-12 NOTE — PROGRESS NOTES
"Subjective   Patient ID: Josh Kessler is a 74 y.o. male who presents for Annual Exam (Annual medicare wellness fasting bw).    HPI     Review of Systems   Constitutional:  Positive for fatigue.   HENT: Negative.     Respiratory: Negative.     Cardiovascular: Negative.    Gastrointestinal: Negative.    Endocrine: Negative.    Genitourinary: Negative.    Musculoskeletal:  Positive for myalgias.   Neurological:  Positive for weakness.   Psychiatric/Behavioral: Negative.         Objective   /70 (BP Location: Right arm)   Pulse 66   Temp 36.3 °C (97.4 °F) (Temporal)   Ht 1.727 m (5' 8\")   Wt 56.2 kg (124 lb)   SpO2 99%   BMI 18.85 kg/m²     Physical Exam  Vitals and nursing note reviewed.   Constitutional:       Appearance: Normal appearance.   HENT:      Right Ear: Tympanic membrane normal.      Left Ear: Tympanic membrane normal.      Ears:      Comments: Bilat hearing aids     Mouth/Throat:      Pharynx: Oropharynx is clear.   Cardiovascular:      Rate and Rhythm: Normal rate and regular rhythm.      Pulses: Normal pulses.      Heart sounds: Normal heart sounds.   Pulmonary:      Breath sounds: Normal breath sounds.   Abdominal:      Palpations: Abdomen is soft.   Musculoskeletal:      Comments: Weakness right leg with brace   Neurological:      Mental Status: He is alert and oriented to person, place, and time.   Psychiatric:         Mood and Affect: Mood normal.         Behavior: Behavior normal.         Assessment/Plan patient seen here for an annual Medicare wellness exam.  We reviewed his questionnaire he is agreeable to his responses.  We did discuss advanced directives.  He has no significant difficulty with depression or anxiety.  Drawing his lab work here today I will let him know the results as soon as available.  Will see him back in a year  Problem List Items Addressed This Visit             ICD-10-CM    Cerebral palsy G80.9    Benign prostatic hyperplasia N40.0    Relevant Orders    " Prostate Specific Antigen    Atherosclerotic heart disease of native coronary artery without angina pectoris I25.10    Relevant Orders    Lipid Panel    CBC and Auto Differential    Comprehensive Metabolic Panel    Peripheral vascular disease, unspecified (CMS-HCC) I73.9    Personal history of transient ischemic attack (TIA), and cerebral infarction without residual deficits Z86.73    Unspecified inflammatory spondylopathy, 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

## 2024-11-12 NOTE — ACP (ADVANCE CARE PLANNING)
Confirming Previous Code Status:   Advance Care Planning Note     Discussion Date: 11/12/24   Discussion Participants: patient    The patient wishes to discuss Advance Care Planning today and the following is a brief summary of our discussion.     Patient has capacity to make their own medical decisions: Yes  Health Care Agent/Surrogate Decision Maker documented in chart: Yes    Documents on file and valid:  Advance Directive/Living Will: Yes   Health Care Power of : Yes  Other: none    Communication of Medical Status/Prognosis:   yes     Communication of Treatment Goals/Options:   yes     Treatment Decisions  Goals of Care: survival is paramount regardless of prognosis, treatment outcome, or burden   yes  Follow Up Plan  no  Team Members  myself  Time Statement: Total face to face time spent on advance care planning was 16 minutes with 16 minutes spent in counseling, including the explanation.    Dewey Cruz DO  11/12/2024 11:16 AM

## 2024-11-13 LAB
ALBUMIN SERPL BCP-MCNC: 4.6 G/DL (ref 3.4–5)
ALP SERPL-CCNC: 107 U/L (ref 33–136)
ALT SERPL W P-5'-P-CCNC: 24 U/L (ref 10–52)
ANION GAP SERPL CALC-SCNC: 13 MMOL/L (ref 10–20)
AST SERPL W P-5'-P-CCNC: 24 U/L (ref 9–39)
BASOPHILS # BLD AUTO: 0.04 X10*3/UL (ref 0–0.1)
BASOPHILS NFR BLD AUTO: 0.5 %
BILIRUB SERPL-MCNC: 1.1 MG/DL (ref 0–1.2)
BUN SERPL-MCNC: 16 MG/DL (ref 6–23)
CALCIUM SERPL-MCNC: 10.2 MG/DL (ref 8.6–10.6)
CHLORIDE SERPL-SCNC: 103 MMOL/L (ref 98–107)
CHOLEST SERPL-MCNC: 121 MG/DL (ref 0–199)
CHOLESTEROL/HDL RATIO: 2.4
CO2 SERPL-SCNC: 30 MMOL/L (ref 21–32)
CREAT SERPL-MCNC: 1 MG/DL (ref 0.5–1.3)
EGFRCR SERPLBLD CKD-EPI 2021: 78 ML/MIN/1.73M*2
EOSINOPHIL # BLD AUTO: 0.16 X10*3/UL (ref 0–0.4)
EOSINOPHIL NFR BLD AUTO: 2 %
ERYTHROCYTE [DISTWIDTH] IN BLOOD BY AUTOMATED COUNT: 12.4 % (ref 11.5–14.5)
GLUCOSE SERPL-MCNC: 87 MG/DL (ref 74–99)
HCT VFR BLD AUTO: 49.2 % (ref 41–52)
HDLC SERPL-MCNC: 51.2 MG/DL
HGB BLD-MCNC: 16.2 G/DL (ref 13.5–17.5)
IMM GRANULOCYTES # BLD AUTO: 0.03 X10*3/UL (ref 0–0.5)
IMM GRANULOCYTES NFR BLD AUTO: 0.4 % (ref 0–0.9)
LDLC SERPL CALC-MCNC: 60 MG/DL
LYMPHOCYTES # BLD AUTO: 2.27 X10*3/UL (ref 0.8–3)
LYMPHOCYTES NFR BLD AUTO: 28 %
MCH RBC QN AUTO: 33.1 PG (ref 26–34)
MCHC RBC AUTO-ENTMCNC: 32.9 G/DL (ref 32–36)
MCV RBC AUTO: 100 FL (ref 80–100)
MONOCYTES # BLD AUTO: 0.54 X10*3/UL (ref 0.05–0.8)
MONOCYTES NFR BLD AUTO: 6.7 %
NEUTROPHILS # BLD AUTO: 5.06 X10*3/UL (ref 1.6–5.5)
NEUTROPHILS NFR BLD AUTO: 62.4 %
NON HDL CHOLESTEROL: 70 MG/DL (ref 0–149)
NRBC BLD-RTO: 0 /100 WBCS (ref 0–0)
PLATELET # BLD AUTO: 196 X10*3/UL (ref 150–450)
POTASSIUM SERPL-SCNC: 5 MMOL/L (ref 3.5–5.3)
PROT SERPL-MCNC: 7.5 G/DL (ref 6.4–8.2)
PSA SERPL-MCNC: 1.3 NG/ML
RBC # BLD AUTO: 4.9 X10*6/UL (ref 4.5–5.9)
SODIUM SERPL-SCNC: 141 MMOL/L (ref 136–145)
TRIGL SERPL-MCNC: 49 MG/DL (ref 0–149)
VLDL: 10 MG/DL (ref 0–40)
WBC # BLD AUTO: 8.1 X10*3/UL (ref 4.4–11.3)

## 2024-11-22 DIAGNOSIS — G45.9 TRANSIENT CEREBRAL ISCHEMIA, UNSPECIFIED TYPE: ICD-10-CM

## 2024-12-03 RX ORDER — ATORVASTATIN CALCIUM 40 MG/1
40 TABLET, FILM COATED ORAL NIGHTLY
Qty: 100 TABLET | Refills: 2 | Status: SHIPPED | OUTPATIENT
Start: 2024-12-03

## 2024-12-06 ENCOUNTER — HOSPITAL ENCOUNTER (OUTPATIENT)
Facility: HOSPITAL | Age: 74
Setting detail: OBSERVATION
Discharge: HOME HEALTH | End: 2024-12-07
Attending: STUDENT IN AN ORGANIZED HEALTH CARE EDUCATION/TRAINING PROGRAM | Admitting: STUDENT IN AN ORGANIZED HEALTH CARE EDUCATION/TRAINING PROGRAM
Payer: MEDICARE

## 2024-12-06 DIAGNOSIS — R42 VERTIGO: ICD-10-CM

## 2024-12-06 DIAGNOSIS — R42 DIZZINESS: Primary | ICD-10-CM

## 2024-12-06 LAB
ANION GAP SERPL CALC-SCNC: 10 MMOL/L (ref 10–20)
BASOPHILS # BLD AUTO: 0.05 X10*3/UL (ref 0–0.1)
BASOPHILS NFR BLD AUTO: 0.5 %
BUN SERPL-MCNC: 15 MG/DL (ref 6–23)
CALCIUM SERPL-MCNC: 8.1 MG/DL (ref 8.6–10.3)
CHLORIDE SERPL-SCNC: 109 MMOL/L (ref 98–107)
CO2 SERPL-SCNC: 25 MMOL/L (ref 21–32)
CREAT SERPL-MCNC: 0.82 MG/DL (ref 0.5–1.3)
EGFRCR SERPLBLD CKD-EPI 2021: >90 ML/MIN/1.73M*2
EOSINOPHIL # BLD AUTO: 0.05 X10*3/UL (ref 0–0.4)
EOSINOPHIL NFR BLD AUTO: 0.5 %
ERYTHROCYTE [DISTWIDTH] IN BLOOD BY AUTOMATED COUNT: 12.2 % (ref 11.5–14.5)
GLUCOSE SERPL-MCNC: 108 MG/DL (ref 74–99)
HCT VFR BLD AUTO: 48.5 % (ref 41–52)
HGB BLD-MCNC: 16.5 G/DL (ref 13.5–17.5)
IMM GRANULOCYTES # BLD AUTO: 0.02 X10*3/UL (ref 0–0.5)
IMM GRANULOCYTES NFR BLD AUTO: 0.2 % (ref 0–0.9)
LYMPHOCYTES # BLD AUTO: 1.63 X10*3/UL (ref 0.8–3)
LYMPHOCYTES NFR BLD AUTO: 17.2 %
MCH RBC QN AUTO: 33 PG (ref 26–34)
MCHC RBC AUTO-ENTMCNC: 34 G/DL (ref 32–36)
MCV RBC AUTO: 97 FL (ref 80–100)
MONOCYTES # BLD AUTO: 0.54 X10*3/UL (ref 0.05–0.8)
MONOCYTES NFR BLD AUTO: 5.7 %
NEUTROPHILS # BLD AUTO: 7.21 X10*3/UL (ref 1.6–5.5)
NEUTROPHILS NFR BLD AUTO: 75.9 %
NRBC BLD-RTO: 0 /100 WBCS (ref 0–0)
PLATELET # BLD AUTO: 178 X10*3/UL (ref 150–450)
POTASSIUM SERPL-SCNC: 5.1 MMOL/L (ref 3.5–5.3)
RBC # BLD AUTO: 5 X10*6/UL (ref 4.5–5.9)
SODIUM SERPL-SCNC: 139 MMOL/L (ref 136–145)
WBC # BLD AUTO: 9.5 X10*3/UL (ref 4.4–11.3)

## 2024-12-06 PROCEDURE — 80048 BASIC METABOLIC PNL TOTAL CA: CPT | Performed by: STUDENT IN AN ORGANIZED HEALTH CARE EDUCATION/TRAINING PROGRAM

## 2024-12-06 PROCEDURE — 83036 HEMOGLOBIN GLYCOSYLATED A1C: CPT | Performed by: STUDENT IN AN ORGANIZED HEALTH CARE EDUCATION/TRAINING PROGRAM

## 2024-12-06 PROCEDURE — 82947 ASSAY GLUCOSE BLOOD QUANT: CPT | Performed by: STUDENT IN AN ORGANIZED HEALTH CARE EDUCATION/TRAINING PROGRAM

## 2024-12-06 PROCEDURE — 36415 COLL VENOUS BLD VENIPUNCTURE: CPT | Performed by: STUDENT IN AN ORGANIZED HEALTH CARE EDUCATION/TRAINING PROGRAM

## 2024-12-06 PROCEDURE — 96374 THER/PROPH/DIAG INJ IV PUSH: CPT | Mod: 59

## 2024-12-06 PROCEDURE — 99285 EMERGENCY DEPT VISIT HI MDM: CPT | Performed by: STUDENT IN AN ORGANIZED HEALTH CARE EDUCATION/TRAINING PROGRAM

## 2024-12-06 PROCEDURE — 2500000002 HC RX 250 W HCPCS SELF ADMINISTERED DRUGS (ALT 637 FOR MEDICARE OP, ALT 636 FOR OP/ED): Performed by: STUDENT IN AN ORGANIZED HEALTH CARE EDUCATION/TRAINING PROGRAM

## 2024-12-06 PROCEDURE — 85025 COMPLETE CBC W/AUTO DIFF WBC: CPT | Performed by: STUDENT IN AN ORGANIZED HEALTH CARE EDUCATION/TRAINING PROGRAM

## 2024-12-06 PROCEDURE — 2500000004 HC RX 250 GENERAL PHARMACY W/ HCPCS (ALT 636 FOR OP/ED): Performed by: STUDENT IN AN ORGANIZED HEALTH CARE EDUCATION/TRAINING PROGRAM

## 2024-12-06 RX ORDER — DIAZEPAM 5 MG/ML
5 INJECTION, SOLUTION INTRAMUSCULAR; INTRAVENOUS ONCE
Status: COMPLETED | OUTPATIENT
Start: 2024-12-06 | End: 2024-12-06

## 2024-12-06 RX ORDER — MECLIZINE HYDROCHLORIDE 25 MG/1
25 TABLET ORAL ONCE
Status: COMPLETED | OUTPATIENT
Start: 2024-12-06 | End: 2024-12-06

## 2024-12-06 RX ADMIN — MECLIZINE HYDROCHLORIDE 25 MG: 25 TABLET ORAL at 23:41

## 2024-12-06 RX ADMIN — DIAZEPAM 5 MG: 10 INJECTION, SOLUTION INTRAMUSCULAR; INTRAVENOUS at 23:41

## 2024-12-06 ASSESSMENT — PAIN - FUNCTIONAL ASSESSMENT: PAIN_FUNCTIONAL_ASSESSMENT: 0-10

## 2024-12-06 ASSESSMENT — PAIN SCALES - GENERAL: PAINLEVEL_OUTOF10: 0 - NO PAIN

## 2024-12-06 ASSESSMENT — COLUMBIA-SUICIDE SEVERITY RATING SCALE - C-SSRS
2. HAVE YOU ACTUALLY HAD ANY THOUGHTS OF KILLING YOURSELF?: NO
6. HAVE YOU EVER DONE ANYTHING, STARTED TO DO ANYTHING, OR PREPARED TO DO ANYTHING TO END YOUR LIFE?: NO
1. IN THE PAST MONTH, HAVE YOU WISHED YOU WERE DEAD OR WISHED YOU COULD GO TO SLEEP AND NOT WAKE UP?: NO

## 2024-12-07 ENCOUNTER — APPOINTMENT (OUTPATIENT)
Dept: RADIOLOGY | Facility: HOSPITAL | Age: 74
End: 2024-12-07
Payer: MEDICARE

## 2024-12-07 ENCOUNTER — HOME HEALTH ADMISSION (OUTPATIENT)
Dept: HOME HEALTH SERVICES | Facility: HOME HEALTH | Age: 74
End: 2024-12-07
Payer: MEDICARE

## 2024-12-07 ENCOUNTER — DOCUMENTATION (OUTPATIENT)
Dept: HOME HEALTH SERVICES | Facility: HOME HEALTH | Age: 74
End: 2024-12-07

## 2024-12-07 VITALS
OXYGEN SATURATION: 96 % | HEIGHT: 68 IN | DIASTOLIC BLOOD PRESSURE: 76 MMHG | SYSTOLIC BLOOD PRESSURE: 121 MMHG | BODY MASS INDEX: 18.79 KG/M2 | TEMPERATURE: 98.4 F | HEART RATE: 71 BPM | WEIGHT: 124 LBS | RESPIRATION RATE: 15 BRPM

## 2024-12-07 PROBLEM — R42 VERTIGO: Status: ACTIVE | Noted: 2024-12-07

## 2024-12-07 PROBLEM — R42 DIZZINESS: Status: ACTIVE | Noted: 2024-12-07

## 2024-12-07 LAB
CHOLEST SERPL-MCNC: 101 MG/DL (ref 0–199)
CHOLESTEROL/HDL RATIO: 2.1
EST. AVERAGE GLUCOSE BLD GHB EST-MCNC: 108 MG/DL
GLUCOSE BLD MANUAL STRIP-MCNC: 100 MG/DL (ref 74–99)
GLUCOSE BLD MANUAL STRIP-MCNC: 112 MG/DL (ref 74–99)
GLUCOSE BLD MANUAL STRIP-MCNC: 89 MG/DL (ref 74–99)
HBA1C MFR BLD: 5.4 %
HDLC SERPL-MCNC: 48.8 MG/DL
HOLD SPECIMEN: NORMAL
LDLC SERPL CALC-MCNC: 43 MG/DL
NON HDL CHOLESTEROL: 52 MG/DL (ref 0–149)
TRIGL SERPL-MCNC: 44 MG/DL (ref 0–149)
VLDL: 9 MG/DL (ref 0–40)

## 2024-12-07 PROCEDURE — 96375 TX/PRO/DX INJ NEW DRUG ADDON: CPT | Mod: 59

## 2024-12-07 PROCEDURE — 70544 MR ANGIOGRAPHY HEAD W/O DYE: CPT

## 2024-12-07 PROCEDURE — 99223 1ST HOSP IP/OBS HIGH 75: CPT | Performed by: STUDENT IN AN ORGANIZED HEALTH CARE EDUCATION/TRAINING PROGRAM

## 2024-12-07 PROCEDURE — 96372 THER/PROPH/DIAG INJ SC/IM: CPT | Performed by: STUDENT IN AN ORGANIZED HEALTH CARE EDUCATION/TRAINING PROGRAM

## 2024-12-07 PROCEDURE — 70551 MRI BRAIN STEM W/O DYE: CPT

## 2024-12-07 PROCEDURE — 70544 MR ANGIOGRAPHY HEAD W/O DYE: CPT | Performed by: RADIOLOGY

## 2024-12-07 PROCEDURE — 97161 PT EVAL LOW COMPLEX 20 MIN: CPT | Mod: GP

## 2024-12-07 PROCEDURE — 97166 OT EVAL MOD COMPLEX 45 MIN: CPT | Mod: GO

## 2024-12-07 PROCEDURE — 99239 HOSP IP/OBS DSCHRG MGMT >30: CPT

## 2024-12-07 PROCEDURE — 2500000001 HC RX 250 WO HCPCS SELF ADMINISTERED DRUGS (ALT 637 FOR MEDICARE OP): Performed by: STUDENT IN AN ORGANIZED HEALTH CARE EDUCATION/TRAINING PROGRAM

## 2024-12-07 PROCEDURE — G0378 HOSPITAL OBSERVATION PER HR: HCPCS

## 2024-12-07 PROCEDURE — 82947 ASSAY GLUCOSE BLOOD QUANT: CPT

## 2024-12-07 PROCEDURE — 70450 CT HEAD/BRAIN W/O DYE: CPT

## 2024-12-07 PROCEDURE — 70551 MRI BRAIN STEM W/O DYE: CPT | Performed by: RADIOLOGY

## 2024-12-07 PROCEDURE — 70547 MR ANGIOGRAPHY NECK W/O DYE: CPT | Performed by: RADIOLOGY

## 2024-12-07 PROCEDURE — 70547 MR ANGIOGRAPHY NECK W/O DYE: CPT

## 2024-12-07 PROCEDURE — 2500000004 HC RX 250 GENERAL PHARMACY W/ HCPCS (ALT 636 FOR OP/ED): Performed by: STUDENT IN AN ORGANIZED HEALTH CARE EDUCATION/TRAINING PROGRAM

## 2024-12-07 PROCEDURE — 36415 COLL VENOUS BLD VENIPUNCTURE: CPT | Performed by: STUDENT IN AN ORGANIZED HEALTH CARE EDUCATION/TRAINING PROGRAM

## 2024-12-07 PROCEDURE — 70450 CT HEAD/BRAIN W/O DYE: CPT | Performed by: RADIOLOGY

## 2024-12-07 PROCEDURE — 99223 1ST HOSP IP/OBS HIGH 75: CPT | Performed by: PSYCHIATRY & NEUROLOGY

## 2024-12-07 PROCEDURE — 80061 LIPID PANEL: CPT | Performed by: STUDENT IN AN ORGANIZED HEALTH CARE EDUCATION/TRAINING PROGRAM

## 2024-12-07 RX ORDER — NAPROXEN SODIUM 220 MG/1
81 TABLET, FILM COATED ORAL DAILY
Status: DISCONTINUED | OUTPATIENT
Start: 2024-12-07 | End: 2024-12-07 | Stop reason: HOSPADM

## 2024-12-07 RX ORDER — ENOXAPARIN SODIUM 100 MG/ML
40 INJECTION SUBCUTANEOUS EVERY 24 HOURS
Status: DISCONTINUED | OUTPATIENT
Start: 2024-12-07 | End: 2024-12-07 | Stop reason: HOSPADM

## 2024-12-07 RX ORDER — ATORVASTATIN CALCIUM 40 MG/1
40 TABLET, FILM COATED ORAL NIGHTLY
Status: DISCONTINUED | OUTPATIENT
Start: 2024-12-07 | End: 2024-12-07 | Stop reason: HOSPADM

## 2024-12-07 RX ORDER — MECLIZINE HYDROCHLORIDE 25 MG/1
25 TABLET ORAL 3 TIMES DAILY PRN
Status: DISCONTINUED | OUTPATIENT
Start: 2024-12-07 | End: 2024-12-07 | Stop reason: HOSPADM

## 2024-12-07 RX ORDER — LORAZEPAM 2 MG/ML
0.5 INJECTION INTRAMUSCULAR ONCE
Status: COMPLETED | OUTPATIENT
Start: 2024-12-07 | End: 2024-12-07

## 2024-12-07 RX ORDER — MECLIZINE HYDROCHLORIDE 25 MG/1
25 TABLET ORAL 3 TIMES DAILY PRN
Qty: 30 TABLET | Refills: 0 | Status: SHIPPED | OUTPATIENT
Start: 2024-12-07 | End: 2025-01-06

## 2024-12-07 RX ORDER — ACETAMINOPHEN 325 MG/1
650 TABLET ORAL EVERY 4 HOURS PRN
Status: DISCONTINUED | OUTPATIENT
Start: 2024-12-07 | End: 2024-12-07 | Stop reason: HOSPADM

## 2024-12-07 RX ORDER — ONDANSETRON HYDROCHLORIDE 2 MG/ML
4 INJECTION, SOLUTION INTRAVENOUS EVERY 6 HOURS PRN
Status: DISCONTINUED | OUTPATIENT
Start: 2024-12-07 | End: 2024-12-07 | Stop reason: HOSPADM

## 2024-12-07 RX ADMIN — ENOXAPARIN SODIUM 40 MG: 40 INJECTION SUBCUTANEOUS at 09:48

## 2024-12-07 RX ADMIN — ASPIRIN 81 MG CHEWABLE TABLET 81 MG: 81 TABLET CHEWABLE at 09:49

## 2024-12-07 RX ADMIN — LORAZEPAM 0.5 MG: 2 INJECTION INTRAMUSCULAR; INTRAVENOUS at 09:49

## 2024-12-07 ASSESSMENT — COGNITIVE AND FUNCTIONAL STATUS - GENERAL
DRESSING REGULAR LOWER BODY CLOTHING: A LITTLE
PERSONAL GROOMING: A LITTLE
MOVING TO AND FROM BED TO CHAIR: A LITTLE
MOBILITY SCORE: 19
WALKING IN HOSPITAL ROOM: A LITTLE
STANDING UP FROM CHAIR USING ARMS: A LITTLE
DAILY ACTIVITIY SCORE: 20
CLIMB 3 TO 5 STEPS WITH RAILING: A LITTLE
TOILETING: A LITTLE
HELP NEEDED FOR BATHING: A LITTLE
TURNING FROM BACK TO SIDE WHILE IN FLAT BAD: A LITTLE

## 2024-12-07 ASSESSMENT — PAIN - FUNCTIONAL ASSESSMENT
PAIN_FUNCTIONAL_ASSESSMENT: 0-10
PAIN_FUNCTIONAL_ASSESSMENT: 0-10

## 2024-12-07 ASSESSMENT — PAIN SCALES - GENERAL
PAINLEVEL_OUTOF10: 0 - NO PAIN
PAINLEVEL_OUTOF10: 0 - NO PAIN

## 2024-12-07 ASSESSMENT — ACTIVITIES OF DAILY LIVING (ADL)
PATIENT'S MEMORY ADEQUATE TO SAFELY COMPLETE DAILY ACTIVITIES?: YES
FEEDING YOURSELF: INDEPENDENT
TOILETING: INDEPENDENT
BATHING_ASSISTANCE: MINIMAL
GROOMING: INDEPENDENT
HEARING - RIGHT EAR: HEARING AID
WALKS IN HOME: INDEPENDENT
DRESSING YOURSELF: INDEPENDENT
BATHING: INDEPENDENT
ADEQUATE_TO_COMPLETE_ADL: YES
ASSISTIVE_DEVICE: WALKER;HEARING AID - RIGHT;HEARING AID - LEFT;EYEGLASSES
JUDGMENT_ADEQUATE_SAFELY_COMPLETE_DAILY_ACTIVITIES: YES
HEARING - LEFT EAR: HEARING AID

## 2024-12-07 ASSESSMENT — ENCOUNTER SYMPTOMS: DIZZINESS: 1

## 2024-12-07 NOTE — CONSULTS
Inpatient consult to Neurology  Consult performed by: Pam Lester DO  Consult ordered by: Stan Mar DO          History Of Present Illness  Josh Kessler is a 74 y.o. male with a history of cerebral palsy, L thalamic/occipital CVA 1/2024 presenting with dizziness.  Patient reports he woke up yesterday with dizziness. Initially improved then worsened through the morning. Describes it as spinning sensation with nausea. Provoked by head movement, improved with staying still.  Also had gait imbalance.  No vomiting.  Has chronic hearing loss and wears hearing aids.  Symptoms are improved today however still noticed with movement.       Past Medical History  Past Medical History:   Diagnosis Date    Cerebral palsy     Other specified health status     No pertinent past medical history     Surgical History  Past Surgical History:   Procedure Laterality Date    OTHER SURGICAL HISTORY  04/24/2018    History Of Prior Surgery     Social History  Social History     Tobacco Use    Smoking status: Never    Smokeless tobacco: Never   Vaping Use    Vaping status: Never Used   Substance Use Topics    Alcohol use: Never    Drug use: Never     Allergies  Patient has no known allergies.  Medications Prior to Admission   Medication Sig Dispense Refill Last Dose/Taking    acetaminophen (Tylenol) 325 mg tablet Take 2 tablets (650 mg) by mouth every 4 hours if needed for mild pain (1 - 3), headaches or fever (temp greater than 38.0 C). 30 tablet 0     aspirin 81 mg EC tablet Take 1 tablet (81 mg) by mouth once daily.       atorvastatin (Lipitor) 40 mg tablet TAKE 1 TABLET BY MOUTH ONCE  DAILY AT BEDTIME 100 tablet 2        Review of Systems   Musculoskeletal:  Positive for gait problem.   Neurological:  Positive for dizziness.   All other systems reviewed and are negative.    Neurological Exam  Physical Exam    On general examination, the patient is well appearing and well groomed. Heart is regular, rate and rhythm. Lungs are  "clear to ausculation bilaterally. There is no peripheral edema. Normal pedal pulses bilaterally. No carotid bruits.   On neurologic examination, the mental status is unremarkable to informal testing. The history is related in quite good detail with no obvious deficit of attention, memory or language. Fund of knowledge is adequate. Orientation is intact to person, place and time. Spontaneous speech is fluent with no paraphasic or aphasic errors. On cranial nerve exam, the pupils are equal, round and reactive to light. Extraocular movements are full, without nystagmus. She reports no double vision on sustained upgaze or lateral gaze. Visual fields are full to confrontation.  There is no bulbofacial weakness or ptosis. There is no ptosis with sustained upgaze. The tongue is midline with no wasting or fasciculations. The palate elevates symmetrically. Facial sensation is intact to light touch and pinprick bilaterally. Hearing is intact to finger rub bilaterally. Shoulder shrug is 5/5 bilaterally.  Neck flexion and extension have full strength. Motor examination reveals slight increase in tone in the right leg from CP. There is full strength in the proximal and distal muscles of the upper and lower extremities bilaterally.  Deep tendon reflexes are 2/4 and symmetric throughout the upper and lower extremities bilaterally, including the ankle jerks. Plantar responses are flexor bilaterally. Fine finger movements and rapid alternating movements are done well in both hands. There is no tremor. On coordination testing, finger-nose-finger testing are done well bilaterally. Heel to shin performed well on the left, some difficulty on the right from CP. Sensory examination reveals normal light touch sensation in the distal upper and lower extremities bilaterally. Gait is deferred due to imbalance.    Last Recorded Vitals  Blood pressure 156/79, pulse 66, temperature 35.6 °C (96.1 °F), resp. rate 15, height 1.727 m (5' 8\"), weight " 56.2 kg (124 lb), SpO2 97%.    Relevant Results  Scheduled medications  aspirin, 81 mg, oral, Daily  atorvastatin, 40 mg, oral, Nightly  enoxaparin, 40 mg, subcutaneous, q24h  LORazepam, 0.5 mg, intravenous, Once      Continuous medications     PRN medications  PRN medications: acetaminophen, meclizine, ondansetron, oxygen    Results for orders placed or performed during the hospital encounter of 12/06/24 (from the past 24 hours)   CBC and Auto Differential   Result Value Ref Range    WBC 9.5 4.4 - 11.3 x10*3/uL    nRBC 0.0 0.0 - 0.0 /100 WBCs    RBC 5.00 4.50 - 5.90 x10*6/uL    Hemoglobin 16.5 13.5 - 17.5 g/dL    Hematocrit 48.5 41.0 - 52.0 %    MCV 97 80 - 100 fL    MCH 33.0 26.0 - 34.0 pg    MCHC 34.0 32.0 - 36.0 g/dL    RDW 12.2 11.5 - 14.5 %    Platelets 178 150 - 450 x10*3/uL    Neutrophils % 75.9 40.0 - 80.0 %    Immature Granulocytes %, Automated 0.2 0.0 - 0.9 %    Lymphocytes % 17.2 13.0 - 44.0 %    Monocytes % 5.7 2.0 - 10.0 %    Eosinophils % 0.5 0.0 - 6.0 %    Basophils % 0.5 0.0 - 2.0 %    Neutrophils Absolute 7.21 (H) 1.60 - 5.50 x10*3/uL    Immature Granulocytes Absolute, Automated 0.02 0.00 - 0.50 x10*3/uL    Lymphocytes Absolute 1.63 0.80 - 3.00 x10*3/uL    Monocytes Absolute 0.54 0.05 - 0.80 x10*3/uL    Eosinophils Absolute 0.05 0.00 - 0.40 x10*3/uL    Basophils Absolute 0.05 0.00 - 0.10 x10*3/uL   Basic metabolic panel   Result Value Ref Range    Glucose 108 (H) 74 - 99 mg/dL    Sodium 139 136 - 145 mmol/L    Potassium 5.1 3.5 - 5.3 mmol/L    Chloride 109 (H) 98 - 107 mmol/L    Bicarbonate 25 21 - 32 mmol/L    Anion Gap 10 10 - 20 mmol/L    Urea Nitrogen 15 6 - 23 mg/dL    Creatinine 0.82 0.50 - 1.30 mg/dL    eGFR >90 >60 mL/min/1.73m*2    Calcium 8.1 (L) 8.6 - 10.3 mg/dL   Light Blue Top   Result Value Ref Range    Extra Tube Hold for add-ons.    Lavender Top   Result Value Ref Range    Extra Tube Hold for add-ons.    PST Top   Result Value Ref Range    Extra Tube Hold for add-ons.    Lipid Panel    Result Value Ref Range    Cholesterol 101 0 - 199 mg/dL    HDL-Cholesterol 48.8 mg/dL    Cholesterol/HDL Ratio 2.1     LDL Calculated 43 <=99 mg/dL    VLDL 9 0 - 40 mg/dL    Triglycerides 44 0 - 149 mg/dL    Non HDL Cholesterol 52 0 - 149 mg/dL   Lavender Top   Result Value Ref Range    Extra Tube Hold for add-ons.    SST TOP   Result Value Ref Range    Extra Tube Hold for add-ons.    POCT GLUCOSE   Result Value Ref Range    POCT Glucose 100 (H) 74 - 99 mg/dL            NIH Stroke Scale  1A. Level of Consciousness: Alert, Keenly Responsive  1B. Ask Month and Age: Both Questions Right  1C. Blink Eyes & Squeeze Hands: Performs Both Tasks  2. Best Gaze: Normal  3. Visual: No Visual Loss  4. Facial Palsy: Normal Symmetrical Movements  5A. Motor - Left Arm: No Drift  5B. Motor - Right Arm: No Drift  6A. Motor - Left Leg: No Drift  6B. Motor - Right Leg: No Drift  7. Limb Ataxia: Absent  8. Sensory Loss: Normal  9. Best Language: No Aphasia  10. Dysarthria: Normal  11. Extinction and Inattention: No Abnormality  NIH Stroke Scale: 0           Harrold Coma Scale  Best Eye Response: Spontaneous  Best Verbal Response: Oriented  Best Motor Response: Follows commands  Valeri Coma Scale Score: 15                 I have personally reviewed the following imaging results CT head wo IV contrast    Result Date: 12/7/2024  Interpreted By:  Bryon Roberts, STUDY: CT HEAD WO IV CONTRAST;  12/7/2024 1:44 am   INDICATION: Signs/Symptoms:dizziness > 24 hours.   COMPARISON: 06/20/2024   ACCESSION NUMBER(S): [id number]   ORDERING CLINICIAN: YAZMIN MIN   TECHNIQUE: Axial noncontrast CT images of the head. Sagittal and coronal reformats were provided.   FINDINGS: BRAIN: No acute intracranial hemorrhage. No mass effect or midline shift. Gray-white matter interfaces are preserved.Subtle white matter hypodensities which are nonspecific but likely related to microangiopathic white matter changes. Focal hypodensity within the left  thalamus which appears similar to 06/20/2024, likely remote lacunar infarct. Focal hypodensity within the left cerebellar hemisphere is also unchanged from 06/20/2024, likely remote lacunar infarct.   VENTRICLES and EXTRA-AXIAL SPACES: Normal.   EXTRACRANIAL SOFT TISSUES:  Within normal limits.   PARANASAL SINUSES/MASTOIDS: The visualized paranasal sinuses and mastoid air cells are aerated.   BONES AND ORBITS: No displaced skull fracture. Orbits are within normal limits.   OTHER FINDINGS: None.       1. No acute intracranial hemorrhage or mass effect. 2. Remote lacunar infarcts as above.   Signed by: Bryon Roberts 12/7/2024 2:35 AM Dictation workstation:   [id number]       Assessment/Plan   Assessment & Plan  Dizziness    Vertigo      Mr. Kessler is a 74 year old man with a recent history of L thalamic/occipital stroke in January 2024. Etiology of stroke unclear. Work up showed elevated LDL, other vascular risk factors well controlled.  Echo normal EF, LA normal. Event monitor negative for a. Fib.  BP elevated on admission however normalized without treatment.     Vertigo likely peripheral based on description however given recent posterior circulation stroke agree with plan for MRI brain.  If negative ok for discharge with vestibular therapy referral if needed if PT clears patient to ambulate.     Will follow up MRI results and if shows stroke provide additional recommendations.  Continue aspirin 81 mg daily and Lipitor 40 mg daily. He should keep his follow up appointment with Dr. Meneses in March.    DO Pam Swenson DO

## 2024-12-07 NOTE — H&P
History Of Present Illness  75 yo M with PMHx of cerebral palsy, HLD, and L thalamic/occipital CVA 1/2024 presents with dizziness. He woke up this morning feeling very dizzy with a room spinning sensation and feeling very off balanced accompanied with nausea but no emesis. He denies HA, vision change, and focal weakness. His last well known was when he went to bed yesterday evening at approximately 10 PM. He was given meclizine and valium in the ED with mild improvement in his symptoms however when ED staff attempted to ambulate him he became very dizzy again and was not able to safely ambulate.     Surgical History  Moh's sx     Social History  Denies tobacco, EtOH, and illicit    Family History  CAD, dementia     Allergies  Patient has no known allergies.    Review of Systems   Neurological:  Positive for dizziness.   All other systems reviewed and are negative.    Physical Exam  G: aox3, NAD, cooperative  HENT: neck supple, no JVD  Eyes: clear sclera, EOMI, PERRL, mild horizontal nystagmus  CV: RRR s1 s2  L: clear  Abd: soft, NT, non distended  Ext: no c/c/e  N: no appreciable acute focal deficits, baseline decreased R sided weakness  Psych: appropriate mood and behavior    Last Recorded Vitals  /66   Pulse 58   Temp 35.5 °C (95.9 °F) (Temporal)   Resp 15   Wt 56.2 kg (124 lb)   SpO2 95%     Assessment/Plan     Persistent dizziness, vertigo    h/o  L thalamic/occipital CVA 1/2024   HLD  Cerebral palsy  DVT ppx  Full code    Plan:  - Continue PRN meclizine and antiemetics, orthostatics, follow up MRI brain, MRA head/neck, neurology consultation, PT/OT evals, continue home ASA and statin    Stan Mar, DO

## 2024-12-07 NOTE — PROGRESS NOTES
Failed bedside visit 1: Pt was not in the room     UPDATE 12:13 PM Failed bedside visit #2 pt has not returned to his room.

## 2024-12-07 NOTE — HH CARE COORDINATION
Home Care received a Referral for Physical Therapy and Occupational Therapy. We have processed the referral for a Start of Care on 12/9/24     If you have any questions or concerns, please feel free to contact us at [phone]. Follow the prompts, enter your five digit zip code, and you will be directed to your care team on WE[facility] 3.

## 2024-12-07 NOTE — DISCHARGE SUMMARY
Discharge Diagnosis  Dizziness    Issues Requiring Follow-Up  Vertigo, suspect peripheral nerve etiology  History of L thalamic/occipital CVA 1/2024   HLD  Cerebral palsy    Discharge Meds     Medication List      START taking these medications     meclizine 25 mg tablet; Commonly known as: Antivert; Take 1 tablet (25   mg) by mouth 3 times a day as needed for dizziness.     CONTINUE taking these medications     acetaminophen 325 mg tablet; Commonly known as: Tylenol; Take 2 tablets   (650 mg) by mouth every 4 hours if needed for mild pain (1 - 3), headaches   or fever (temp greater than 38.0 C).   aspirin 81 mg EC tablet   atorvastatin 40 mg tablet; Commonly known as: Lipitor; TAKE 1 TABLET BY   MOUTH ONCE  DAILY AT BEDTIME       Test Results Pending At Discharge  Pending Labs       No current pending labs.          Hospital Course  73 yo M with PMHx of cerebral palsy, HLD, and L thalamic/occipital CVA 1/2024 presents with dizziness. He woke up this morning feeling very dizzy with a room spinning sensation and feeling very off balanced accompanied with nausea but no emesis. He was given meclizine and valium in the ED with mild improvement in his symptoms however when ED staff attempted to ambulate him he became very dizzy again and was not able to safely ambulate.  CT head and MRI brain, MRA head and neck were negative.  Neurology consult recommended patient follow-up with vestibular therapy is concern for central cause of vertigo was ruled out.  Patient is to continue ASA and statin.  Patient stated improvement of symptoms with meclizine and stable for discharge home with home health care and follow-up with PCP.    Pertinent Physical Exam At Time of Discharge  Physical Exam  Constitutional:       Appearance: Normal appearance.   HENT:      Head: Normocephalic.   Eyes:      Pupils: Pupils are equal, round, and reactive to light.   Cardiovascular:      Rate and Rhythm: Normal rate and regular rhythm.      Pulses:  Normal pulses.      Heart sounds: Normal heart sounds.   Pulmonary:      Effort: Pulmonary effort is normal.      Breath sounds: Normal breath sounds.   Abdominal:      General: Abdomen is flat.      Palpations: Abdomen is soft.   Skin:     General: Skin is warm and dry.      Capillary Refill: Capillary refill takes less than 2 seconds.   Neurological:      General: No focal deficit present.      Mental Status: He is alert and oriented to person, place, and time.   Psychiatric:         Mood and Affect: Mood normal.         Outpatient Follow-Up  Future Appointments   Date Time Provider Department Center   12/10/2024 To Be Determined Yamilka Erickson, PT [facility]   12/11/2024 To Be Determined Comfort Reyes OT [facility]   3/31/2025  9:00 AM Kermit Meneses MD [id number]   11/13/2025  9:30 AM Dewey Cruz DO [id number]         Andre Nava DO

## 2024-12-07 NOTE — ED TRIAGE NOTES
Patient presents to ED via EMS from home with complaints of dizziness. Patient states he's had intermittent dizziness all day. Patient states the dizziness has been causing him nausea. Patient has history of stroke in January 2024. Patient unable to stand or sit up in bed due to nausea.

## 2024-12-07 NOTE — ED PROVIDER NOTES
HPI   Chief Complaint   Patient presents with    Dizziness       74-year-old male past medical history of CVA presents today for evaluation of dizziness.  States feels like the room is spinning.  Began when he woke up this morning.  States that last time he felt well was last night before he went to bed which was approximately 10:30 PM.  Denies chest pain shortness of breath.  Worse with different positions.              Patient History   Past Medical History:   Diagnosis Date    Cerebral palsy     Other specified health status     No pertinent past medical history     Past Surgical History:   Procedure Laterality Date    OTHER SURGICAL HISTORY  04/24/2018    History Of Prior Surgery     No family history on file.  Social History     Tobacco Use    Smoking status: Never    Smokeless tobacco: Never   Vaping Use    Vaping status: Never Used   Substance Use Topics    Alcohol use: Never    Drug use: Never       Physical Exam   ED Triage Vitals [12/06/24 2320]   Temperature Heart Rate Respirations BP   35.5 °C (95.9 °F) 70 18 (!) 195/91      Pulse Ox Temp Source Heart Rate Source Patient Position   95 % Temporal Monitor Lying      BP Location FiO2 (%)     Left arm --       Physical Exam  Vitals and nursing note reviewed.   Constitutional:       Appearance: Normal appearance.   HENT:      Head: Normocephalic and atraumatic.      Right Ear: External ear normal.      Left Ear: External ear normal.      Nose: Nose normal.      Mouth/Throat:      Mouth: Mucous membranes are moist.   Cardiovascular:      Rate and Rhythm: Normal rate and regular rhythm.      Pulses: Normal pulses.      Heart sounds: Normal heart sounds.   Pulmonary:      Effort: Pulmonary effort is normal.      Breath sounds: Normal breath sounds.   Abdominal:      General: Abdomen is flat. There is no distension.      Palpations: Abdomen is soft.      Tenderness: There is no abdominal tenderness. There is no guarding or rebound.   Musculoskeletal:          General: No deformity.   Skin:     General: Skin is warm.   Neurological:      General: No focal deficit present.      Mental Status: He is alert and oriented to person, place, and time. Mental status is at baseline.      Cranial Nerves: No cranial nerve deficit.      Comments: NIH equals 0 negative skew leftward horizontal nystagmus   Psychiatric:         Mood and Affect: Mood normal.         Behavior: Behavior normal.         Thought Content: Thought content normal.         Judgment: Judgment normal.           ED Course & MDM   ED Course as of 12/08/24 0647   Sun Dec 08, 2024   0646 ECG 12 lead  Normal sinus rate 68 normal intervals [SE]      ED Course User Index  [SE] Alexys Liu MD         Diagnoses as of 12/08/24 0647   Dizziness   Vertigo                 No data recorded     [place] Coma Scale Score: 15 (12/07/24 1000 : Anne Galdamez, RN)       NIH Stroke Scale: 1 (12/07/24 1000 : Anne Galdamez, SYDNEE)                   Medical Decision Making  Patient presents for evaluation of dizziness.  Negative Romberg NIH 0.  Was given medications with improvement of his symptoms however when he was set up to ambulate he became very dizzy again.  Admit for PT OT further eval accepted by hospitalist    Amount and/or Complexity of Data Reviewed  Labs: ordered.  Radiology: ordered.  ECG/medicine tests: ordered and independent interpretation performed.    Risk  Prescription drug management.  Parenteral controlled substances.  Decision regarding hospitalization.        Procedure  Procedures     Alexys Liu MD  12/08/24 0647

## 2024-12-07 NOTE — PROGRESS NOTES
Physical Therapy    Physical Therapy Evaluation    Patient Name: Josh Kessler  MRN: [id number]  Today's Date: 12/7/2024   Time Calculation  Start Time: 0931  Stop Time: 0951  Time Calculation (min): 20 min  608/608-A    Assessment/Plan   PT Assessment  PT Assessment Results: Decreased strength, Decreased endurance, Impaired balance, Decreased mobility  Rehab Prognosis: Good  Evaluation/Treatment Tolerance: Patient limited by fatigue  Medical Staff Made Aware: Yes  End of Session Communication: Bedside nurse  Assessment Comment: Pt presents /c above impairments and decline from functional baseline 2nd acute medical conditions. Pt will benefit from continued PT services at low intensity to address above and maximize functional mobility.  End of Session Patient Position: Bed, 2 rail up, Alarm on  IP OR SWING BED PT PLAN  Inpatient or Swing Bed: Inpatient  PT Plan  Treatment/Interventions: Bed mobility, Transfer training, Gait training, Balance training, Neuromuscular re-education, Strengthening, Endurance training, Therapeutic exercise, Therapeutic activity, Stair training  PT Plan: Ongoing PT  PT Frequency: 3 times per week  PT Discharge Recommendations: Low intensity level of continued care  PT - OK to Discharge: Yes    Subjective     Current Problem:  1. Dizziness  meclizine (Antivert) 25 mg tablet        Patient Active Problem List   Diagnosis    Acute pharyngitis    Basal cell adenoma    Biceps tendinitis of both shoulders    Bilateral hearing loss    Borderline hyperlipidemia    Cerebral palsy    Closed fracture of nasal bones    Deviated nasal septum    History of Mohs micrographic surgery for skin cancer    Low back pain    Neck pain    Osteoarthritis of finger of right hand    Primary osteoarthritis of right knee    Wound, open, forehead    Protein-calorie malnutrition, unspecified severity (Multi)    Benign prostatic hyperplasia    Generalized weakness    Atherosclerotic heart disease of native coronary  artery without angina pectoris    Benign neoplasm of major salivary gland, unspecified    Peripheral vascular disease, unspecified (CMS-HCC)    Personal history of transient ischemic attack (TIA), and cerebral infarction without residual deficits    Repeated falls    Sciatica, right side    Spondylolisthesis, lumbar region    Unspecified inflammatory spondylopathy, lumbar region (CMS-HCC)    Dizziness    Vertigo       General Visit Information:  General  Reason for Referral: PT eval and treat  Referred By: Ru  Past Medical History Relevant to Rehab:  (HLD, CP, CVA 1/24)  Co-Treatment: OT  Co-Treatment Reason: possible two person assist, maximize functional mobility  Prior to Session Communication: Bedside nurse  Patient Position Received: Bed, 2 rail up, Alarm on  General Comment: Pt presents with dizziness, nausea. Head CT (-) acute, shows remote lacunar infarcts. Brain MRI pending. Neuro following. Pt cleared for therapy by nursing. Pt supine, agreeable.    Home Living:  Home Living  Home Living Comments: Pt is , lives alone in ran[employer]iem[employer] style home /c basement. 4STE /c rail. Son lives in [place], but pt reports having a cousin who lives locally and can provide some support.    Prior Level of Function:  Prior Function Per Pt/Caregiver Report  Prior Function Comments: Indep /c ADL/IADLs. Drives. Wears RLE AFO outside the home, but ambulates short distances /s. Uses Rollator for all mobility. Also has WW and W/C. Denies recent falls.    Precautions:  Precautions  Precautions Comment: falls, RLE weakness d/t CP       Objective     Pain:  Pain Assessment  Pain Assessment: 0-10  0-10 (Numeric) Pain Score: 0 - No pain    Cognition:  Cognition  Overall Cognitive Status: Within Functional Limits    General Assessments:  General Observation  General Observation: activity orders: OOB /c A lines: piv   Activity Tolerance  Endurance: Tolerates less than 10 min exercise, no significant change in vital  signs  Sensation  Sensation Comment: reports only chronic n/t R thumb since CVA, denies new changes  Strength  Strength Comments: BLE hip flex at least 3-/5, R knee ext 3/5, L knee ext 4-/5, R DF 2/5, L DF3+/5           Dynamic Sitting Balance  Dynamic Sitting-Comments: G  Dynamic Standing Balance  Dynamic Standing-Comments: F    Functional Assessments:     Bed Mobility  Bed Mobility: Yes  Bed Mobility 1  Bed Mobility Comments 1: Supine<>Sit /c supervision, UE support.  Transfers  Transfer: Yes  Transfer 1  Trials/Comments 1: Sit<>Stand /c CGA, UE support.  Ambulation/Gait Training  Ambulation/Gait Training Performed:  (Pt ambulates 25'WW, CGA. Pt demos toe walking, dragging RLE during advancement, step through pattern /c inez knee flexion, hip IR. No overt instability and pt reports feeling this is near his baseline ambulation. Reports some dizziness, but improved.)          Outcome Measures:     [facility] Basic Mobility  Turning from your back to your side while in a flat bed without using bedrails: None  Moving from lying on your back to sitting on the side of a flat bed without using bedrails: A little  Moving to and from bed to chair (including a wheelchair): A little  Standing up from a chair using your arms (e.g. wheelchair or bedside chair): A little  To walk in hospital room: A little  Climbing 3-5 steps with railing: A little  Basic Mobility - Total Score: 19             Goals:  Encounter Problems       Encounter Problems (Active)       PT Problem       STG - Pt will transition supine <> sitting with mod I (Progressing)       Start:  12/07/24    Expected End:  12/21/24            STG - Pt will transfer STS with mod I (Progressing)       Start:  12/07/24    Expected End:  12/21/24            STG - Pt will amb 125' using WW with mod I (Progressing)       Start:  12/07/24    Expected End:  12/21/24            STG -  Pt will navigate 4 stairs using rail with supervision (Progressing)       Start:  12/07/24     Expected End:  12/21/24                 Education Documentation  Mobility Training, taught by Toshia Mendiola, PT at 12/7/2024 12:32 PM.  Learner: Patient  Readiness: Acceptance  Method: Explanation  Response: Verbalizes Understanding, Needs Reinforcement    Education Comments  No comments found.

## 2024-12-09 ENCOUNTER — PATIENT OUTREACH (OUTPATIENT)
Dept: PRIMARY CARE | Facility: CLINIC | Age: 74
End: 2024-12-09
Payer: MEDICARE

## 2024-12-09 NOTE — PROGRESS NOTES
Discharge Facility: [facility]   Discharge Diagnosis: vertigo  Admission Date: 12/6/24  Discharge Date: 12/7/24    PCP Appointment Date: TBD  Specialist Appointment Date: Neuro march  Hospital Encounter and Summary Linked: Yes  See discharge assessment below for further details    Engagement  Call Start Time: 1122 (12/9/2024  1:40 PM)    Medications  Medications reviewed with patient/caregiver?: Yes (12/9/2024  1:40 PM)  Is the patient having any side effects they believe may be caused by any medication additions or changes?: No (12/9/2024  1:40 PM)  Does the patient have all medications ordered at discharge?: Yes (12/9/2024  1:40 PM)  Care Management Interventions: Provided patient education (12/9/2024  1:40 PM)  Prescription Comments: meclizine as needed (12/9/2024  1:40 PM)  Is the patient taking all medications as directed (includes completed medication regime)?: Yes (12/9/2024  1:40 PM)  Care Management Interventions: Provided patient education (12/9/2024  1:40 PM)  Medication Comments: No issues obtaining medications (12/9/2024  1:40 PM)    Appointments  Does the patient have a primary care provider?: Yes (12/9/2024  1:40 PM)  Care Management Interventions: -- (Message sent to office due to no avail for 14 days) (12/9/2024  1:40 PM)  Has the patient kept scheduled appointments due by today?: Yes (12/9/2024  1:40 PM)  Care Management Interventions: Advised patient to keep appointment (12/9/2024  1:40 PM)    Self Management  What is the home health agency?: [facility] (12/9/2024  1:40 PM)  Has home health visited the patient within 72 hours of discharge?: Call prior to 72 hours (12/9/2024  1:40 PM)    Patient Teaching  Does the patient have access to their discharge instructions?: Yes (12/9/2024  1:40 PM)  Care Management Interventions: Reviewed instructions with patient (12/9/2024  1:40 PM)  What is the patient's perception of their health status since discharge?: Improving (12/9/2024  1:40 PM)  Is the  patient/caregiver able to teach back the hierarchy of who to call/visit for symptoms/problems? PCP, Specialist, Home Health nurse, Urgent Care, ED, 911: Yes (12/9/2024  1:40 PM)  Patient/Caregiver Education Comments: Aware to seek care with any symptoms of stroke, use meclizine as needed (12/9/2024  1:40 PM)    Wrap Up  Wrap Up Additional Comments: Successful transition of care outreach with patient. Patient reports doing well at home since discharge. New meds/changes reviewed with patient during outreach. Patient denies any stroke symptoms, dizziness less present. Patient denies further discharge questions/concerns/needs at time of outreach call. Emphasized that follow up appts are needed after discharge with PCP and reviewed needed follow ups with any specialties to assess response to treatment from hospitalization. Patient aware of my availability for non-emergent concerns. (12/9/2024  1:40 PM)  Call End Time: 1130 (12/9/2024  1:40 PM)

## 2024-12-10 ENCOUNTER — HOME CARE VISIT (OUTPATIENT)
Dept: HOME HEALTH SERVICES | Facility: HOME HEALTH | Age: 74
End: 2024-12-10
Payer: MEDICARE

## 2024-12-10 VITALS — DIASTOLIC BLOOD PRESSURE: 82 MMHG | OXYGEN SATURATION: 97 % | HEART RATE: 80 BPM | SYSTOLIC BLOOD PRESSURE: 140 MMHG

## 2024-12-10 PROCEDURE — G0151 HHCP-SERV OF PT,EA 15 MIN: HCPCS

## 2024-12-10 ASSESSMENT — ENCOUNTER SYMPTOMS
PAIN: 1
PAIN LOCATION: NECK
SUBJECTIVE PAIN PROGRESSION: WAXING AND WANING
PERSON REPORTING PAIN: PATIENT
LOWEST PAIN SEVERITY IN PAST 24 HOURS: 0/10
PAIN LOCATION - PAIN SEVERITY: 1/10
HIGHEST PAIN SEVERITY IN PAST 24 HOURS: 4/10

## 2024-12-10 ASSESSMENT — ACTIVITIES OF DAILY LIVING (ADL)
ENTERING_EXITING_HOME: SUPERVISION
OASIS_M1830: 02

## 2024-12-11 ENCOUNTER — HOME CARE VISIT (OUTPATIENT)
Dept: HOME HEALTH SERVICES | Facility: HOME HEALTH | Age: 74
End: 2024-12-11
Payer: MEDICARE

## 2024-12-11 VITALS — SYSTOLIC BLOOD PRESSURE: 128 MMHG | OXYGEN SATURATION: 97 % | DIASTOLIC BLOOD PRESSURE: 76 MMHG | HEART RATE: 71 BPM

## 2024-12-11 PROCEDURE — G0152 HHCP-SERV OF OT,EA 15 MIN: HCPCS

## 2024-12-11 ASSESSMENT — ENCOUNTER SYMPTOMS
HIGHEST PAIN SEVERITY IN PAST 24 HOURS: 3/10
SUBJECTIVE PAIN PROGRESSION: UNCHANGED
PAIN LOCATION - PAIN SEVERITY: 3/10
PERSON REPORTING PAIN: PATIENT
PAIN: 1
PAIN LOCATION - RELIEVING FACTORS: MEDS/POSITIONING
PAIN LOCATION - PAIN FREQUENCY: FREQUENT
LOWEST PAIN SEVERITY IN PAST 24 HOURS: 1/10
PAIN LOCATION: GENERALIZED

## 2024-12-11 ASSESSMENT — ACTIVITIES OF DAILY LIVING (ADL)
DRESSING_UB_CURRENT_FUNCTION: SUPERVISION
BATHING_CURRENT_FUNCTION: SUPERVISION
DRESSING_LB_CURRENT_FUNCTION: SUPERVISION
PREPARING MEALS: NEEDS ASSISTANCE
TOILETING: SUPERVISION
BATHING ASSESSED: 1
TOILETING: 1

## 2024-12-13 ENCOUNTER — HOME CARE VISIT (OUTPATIENT)
Dept: HOME HEALTH SERVICES | Facility: HOME HEALTH | Age: 74
End: 2024-12-13
Payer: MEDICARE

## 2024-12-13 PROCEDURE — G0151 HHCP-SERV OF PT,EA 15 MIN: HCPCS

## 2024-12-13 ASSESSMENT — ENCOUNTER SYMPTOMS
PERSON REPORTING PAIN: PATIENT
PAIN: 1

## 2024-12-17 ENCOUNTER — HOME CARE VISIT (OUTPATIENT)
Dept: HOME HEALTH SERVICES | Facility: HOME HEALTH | Age: 74
End: 2024-12-17
Payer: MEDICARE

## 2024-12-17 ENCOUNTER — TELEPHONE (OUTPATIENT)
Dept: PRIMARY CARE | Facility: CLINIC | Age: 74
End: 2024-12-17
Payer: MEDICARE

## 2024-12-17 VITALS — SYSTOLIC BLOOD PRESSURE: 112 MMHG | HEART RATE: 71 BPM | OXYGEN SATURATION: 98 % | DIASTOLIC BLOOD PRESSURE: 70 MMHG

## 2024-12-17 DIAGNOSIS — G80.9 CEREBRAL PALSY, UNSPECIFIED TYPE (MULTI): Primary | ICD-10-CM

## 2024-12-17 PROCEDURE — G0151 HHCP-SERV OF PT,EA 15 MIN: HCPCS

## 2024-12-17 PROCEDURE — G0152 HHCP-SERV OF OT,EA 15 MIN: HCPCS

## 2024-12-17 ASSESSMENT — ENCOUNTER SYMPTOMS
LOWEST PAIN SEVERITY IN PAST 24 HOURS: 2/10
PAIN LOCATION: BACK
PERSON REPORTING PAIN: PATIENT
PAIN LOCATION - EXACERBATING FACTORS: POSITIONING
PAIN LOCATION - PAIN SEVERITY: 4/10
PAIN LOCATION - PAIN FREQUENCY: FREQUENT
HIGHEST PAIN SEVERITY IN PAST 24 HOURS: 4/10
SUBJECTIVE PAIN PROGRESSION: UNCHANGED
PAIN: 1
DENIES PAIN: 1
PERSON REPORTING PAIN: PATIENT

## 2024-12-17 ASSESSMENT — ACTIVITIES OF DAILY LIVING (ADL)
OASIS_M1830: 01
TOILETING: 1
DRESSING_LB_CURRENT_FUNCTION: INDEPENDENT
LAUNDRY ASSESSED: 1
LAUNDRY: INDEPENDENT
DRESSING_UB_CURRENT_FUNCTION: INDEPENDENT
BATHING_CURRENT_FUNCTION: INDEPENDENT
BATHING ASSESSED: 1
TOILETING: INDEPENDENT
HOME_HEALTH_OASIS: 00
PREPARING MEALS: INDEPENDENT

## 2024-12-27 ENCOUNTER — PATIENT OUTREACH (OUTPATIENT)
Dept: PRIMARY CARE | Facility: CLINIC | Age: 74
End: 2024-12-27
Payer: MEDICARE

## 2024-12-27 NOTE — PROGRESS NOTES
Call regarding two week check in post hospital stay.  At time of outreach call the patient feels as if their condition has improved slightly since last visit.  Having vertigo at times, more aware of what to do in regards to moving slowly in changing positions, comes and goes. Has PT appt coming up but will let us know if this does not help.

## 2025-01-08 ENCOUNTER — EVALUATION (OUTPATIENT)
Dept: PHYSICAL THERAPY | Facility: CLINIC | Age: 75
End: 2025-01-08
Payer: MEDICARE

## 2025-01-08 DIAGNOSIS — G80.9 CEREBRAL PALSY, UNSPECIFIED TYPE (MULTI): ICD-10-CM

## 2025-01-08 DIAGNOSIS — R42 VERTIGO: ICD-10-CM

## 2025-01-08 DIAGNOSIS — H81.11 BENIGN PAROXYSMAL POSITIONAL VERTIGO OF RIGHT EAR: Primary | ICD-10-CM

## 2025-01-08 PROCEDURE — 95992 CANALITH REPOSITIONING PROC: CPT | Mod: GP | Performed by: GENERAL ACUTE CARE HOSPITAL

## 2025-01-08 PROCEDURE — 97530 THERAPEUTIC ACTIVITIES: CPT | Mod: GP,59 | Performed by: GENERAL ACUTE CARE HOSPITAL

## 2025-01-08 PROCEDURE — 97161 PT EVAL LOW COMPLEX 20 MIN: CPT | Mod: GP | Performed by: GENERAL ACUTE CARE HOSPITAL

## 2025-01-08 ASSESSMENT — PATIENT HEALTH QUESTIONNAIRE - PHQ9
2. FEELING DOWN, DEPRESSED OR HOPELESS: NOT AT ALL
SUM OF ALL RESPONSES TO PHQ9 QUESTIONS 1 AND 2: 0
1. LITTLE INTEREST OR PLEASURE IN DOING THINGS: NOT AT ALL

## 2025-01-08 NOTE — PROGRESS NOTES
Physical Therapy    Physical Therapy Evaluation and Treatment      Patient Name: Josh Kessler  MRN: [id number]  Today's Date: 1/8/2025  Time Calculation  Start Time: 1150  Stop Time: 1235  Time Calculation (min): 45 min    Insurance - reviewed   Visit number: 8 (updated 01/08/25)   Payor: UNITED HEALTHCARE MEDICARE / Plan: UNITED HEALTHCARE MEDICARE / Product Type: *No Product type* /    $20 COPAY VISITS ARE MED NEC NEEDS AUTH [employer] PAYS % OOP 3900.00 0 APPLIED   Referring Provider: Dewey Cruz DO       Assessment:  Josh Kessler is a 74 y.o. old who presents to skilled vestibular physical therapy evaluation with intermittent dizziness which he describes as vertigo associated with positional changes. Positive supine roll test with observed ageotropic nystagmus lasting > 60 seconds more intense on the left consistent with right horizontal cupulolithiasis.   Performed subsequent repositioning, Casani, and will monitor effectiveness. he is an excellent candidate for canalith repositioning maneuvers.  No other abnormalities noted with oculomotor assessment today and the remainder of the office based vestibular examination appeared normal. his impairments include: intermittent dizziness, balance deficits, gait deficits, postural deficits, pain, decreased strength, and decreased functional mobility.   Due to these impairments, he has the following functional limitations and participation restrictions:  increased fall risk, difficulty with ambulation, difficulty performing transfers, difficulty performing household activities, difficulty performing recreational activities, and difficulty with completing/performing some ADLs/IADLs Josh's clinical presentation is Stable and/or uncomplicated characteristics with the level of complexity being low Josh's potential for rehab is good.  Skilled vestibular PT warranted to address intermittent dizziness in order to improve Josh Kessler's functional  independence and safety at home and in the community as well as decrease fall risk. Skilled physical therapy services are appropriate and beneficial in order to achieve measurable and meaningful change in the objective tests and measures. Utilization of skilled physical therapy services will aid in advancing his functional status and attaining his therapy-related goals. Josh verbalized understanding and is in agreement with all goals and plan of care.  Josh left session with all questions answered and no increase in symptoms.        Plan:  OP PT Plan  Treatment/Interventions: Canalith repositioning, Education/ Instruction, Gait training, Neuromuscular re-education, Therapeutic activities, Therapeutic exercises, Self care/ home management  PT Plan: Skilled PT  PT Frequency: 2 times per week  Duration: 2-6 weeks  Onset Date: 12/06/24  Rehab Potential: Good  Plan of Care Agreement: Patient    Current Problem:   Problem List Items Addressed This Visit             ICD-10-CM    Cerebral palsy G80.9    Relevant Orders    Follow Up In Physical Therapy    Vertigo R42    Relevant Orders    Follow Up In Physical Therapy    Benign paroxysmal positional vertigo of right ear - Primary H81.11    Relevant Orders    Follow Up In Physical Therapy         Subjective    General:       Josh Kessler  is a 74 y.o. male  presenting to the clinic with chief complaint of dizziness that lasted approximately a minute when getting out of bed.  Josh Kessler  reports symptoms began 12/6/24 with nausea but no emesis. He went to the ED and was discharged home.    Reports symptoms are worse with bending over, lying down in bed.     Prior Treatment/diagnostic images: CT head and MRI brain, MRA head and neck at ED were negative.     Medical History Form: Reviewed (scanned into chart)    Living Environment: 1 story ranch home; 4 steps to enter the house with HR; Walk-in shower with 3 grab bars in the bathroom. Shower chair. No steps needed  once in the home. Washer/Dryer setup in the first floor.     Occupation: Retired      Prior Level of Function: modified Independent with homemaking and ambulation using rollator, mod Independent with ADLs and functional transfers     Functional Limitations: unsteady due to dizziness    Pt goals for therapy:  to get rid of dizziness    Precautions:  Fall Risk: High   Past Medical history: History of L thalamic/occipital CVA 1/2024, cerebral palsy, HLD     Pain:  6/10  pain location: B shoulders         Objective     Oculomotor Exam:   Extraocular ROM = WNL  Smooth pursuits = WNL at lower speeds  Horizontal saccades = WNL  Vertical saccades = WNL  Convergence = WNL  Cover/uncover = WNL  Cross Cover = WNL  Spontaneous Nystagmus (with goggles) = negative  Gaze Evoked Nystagmus (with goggles) = negative  VOR =WNL    Positional Testing: with goggles  [place]-Hallpike Right = negative   [place]-Hallpike Left = negative   Horizontal Roll Test Right = positive ageotropic   Horizontal Roll Test Left = positive ageotropic    Gait: ambulates with rollator, flexed forward with decreased hip, knee and ankle DF, decreased noel.    Transfers: close SBA provided with decreased eccentric control, plops to sit     Outcome Measures:  Other Measures  Activities - Specific Balance Confidence Scale: 86  Dizziness Handicap Inventory: 36     Treatments:    Therapeutic Activity: 17 minutes  Role of PT and PT POC.  Educated Josh regarding benefit and purpose of skilled vestibular PT services along with results of examination findings and how this correlates to their chief complaint.   Answered Josh Kessler's questions in full.  Reviewed relevant anatomy using model/AVOR yaritza.   Reviewed pathophysiology of BPPV using model, rationale for CRP.  Discussed other possible causes of  Dizziness.   Risk factors associated with INC prevalence of BPPV, possibly that it can return.  Josh was issued the ANPT post CRP handout and post CRP precautions  reviewed: Josh Kessler to avoid looking up/down, quick head turns during ADLs for a few hours after repositioning, to avoid lying down until bed time.  Josh advised that he may feel off balance after the reposition and to use safety measures at home/device with walking or resting as needed to decrease fall risk, Josh Kessler verbalized understanding.  Recent evidence that vitamin D and calcium supplementation reduces recurrences of BPPV especially when serum vitamin D is subnormal. (Tiago GEORGE et al, 2020).  No vit D level available in EMR.    Canalith Reposition:    3 minutes  Casani for right horizontal cupulolithiasis 1x.  Includes time spent guarding Josh for safety     EDUCATION:  Outpatient Education  Individual(s) Educated: Patient  Education Provided: Anatomy, Fall Risk, Physiology, POC  Risk and Benefits Discussed with Patient/Caregiver/Other: yes  Patient/Caregiver Demonstrated Understanding: yes  Plan of Care Discussed and Agreed Upon: yes  Patient Response to Education: Patient/Caregiver Verbalized Understanding of Information    Goals:  STG's (within 2 weeks)    Josh Kessler will report < 3 occurrences of positional vertigo for one week with daily activities.    LTG's (by discharge)    Josh Kessler will test negative for positional vertigo.    Josh Kessler will report no complaint of positional imbalance or vertigo for one week with daily activities.    Josh Kessler will decrease DHI by >/= 18 points (minimally clinically important difference) or </=34 points (16-34 Points is a mild handicap) in order to improve safety at home and decrease falls risk. Baseline 36/100      Time Calculation  Start Time: 1150  Stop Time: 1235  Time Calculation (min): 45 min  PT Evaluation Time Entry  PT Evaluation (Low) Time Entry: 25  PT Therapeutic Procedures Time Entry  Therapeutic Activity Time Entry: 17  PT Modalities Time Entry  Canalith Repositioning Time Entry: 3

## 2025-01-30 ENCOUNTER — PATIENT OUTREACH (OUTPATIENT)
Dept: PRIMARY CARE | Facility: CLINIC | Age: 75
End: 2025-01-30
Payer: MEDICARE

## 2025-02-06 ENCOUNTER — APPOINTMENT (OUTPATIENT)
Dept: PHYSICAL THERAPY | Facility: CLINIC | Age: 75
End: 2025-02-06
Payer: MEDICARE

## 2025-02-12 NOTE — PROGRESS NOTES
"Physical Therapy    Physical Therapy Treatment    Patient Name: Josh Kessler  MRN: [id number]  Today's Date: 2025     Insurance - reviewed   Visit number: 2 (updated 25)   Payor: [id number] MEDICARE / Plan: UNITED HEALTHCARE MEDICARE / Product Type: *No Product type* /    $20 COPAY VISITS ARE MED NEC NEEDS AUTH [employer]  (paper form needs to be completed by therapist AND patient) PAYS % OOP 3900.00 0 APPLIED   AUTH FOR 12 V -3/15/25   Referring Provider: Iris Vences DO       Current Problem  Problem List Items Addressed This Visit    None    Precautions:  Fall Risk: High   Past Medical history: History of L thalamic/occipital CVA 2024, cerebral palsy, HLD     General  Subjective    ***right horizontal cupulolithiasis.     Josh Kessler {taldenies:74239::\"denies\"} any falls or complications since last session. Josh Kessler reports ***    Josh Kessler reports {talgfp:21076::\"good\"} compliance with HEP.    Pain:  ***10  Pain location: ***      Objective     Treatments:  Abbreviation Key  NP = not performed this visit   NV = next visit  // = parallel    Assigned HEP- [facility] ***    {talbillingoptions:85116}      Outpatient Education: {Education:59414}   Josh Kessler verbalizes understanding of all education provided: {yes,no:90780::\"Yes\"}    Assessment:  Josh Kessler presents to PT treatment session with ongoing dizziness. {talsp:82003} loaded [place] Hallpike to the {talrl:51194} {talw:95578} observed {taln:94970::\"torsional\"} nystagmus of short duration (<60 seconds) consistent with {talrl:42278} {ta::\"posterior\"} canalithiasis. Performed subsequent repositioning, {talcm:06635::\"Modified Epley\"}, and will monitor effectiveness.      Josh's  response to tx:     Josh's Progress towards goals: addressed positional dizziness    Justification for skilled care: Josh Kessler continues to have intermittent dizziness limiting participation in ADLs, IADLs, and meaningful " Pt contacted via [employer] about whether or not she would like to have these labs completed.   activities. Further skilled therapy services are warranted to decrease fall risk and to address the remaining impairments in order to progress towards functional goals for the Josh  to fully participate in an active lifestyle.     Josh left session with all questions answered and no increase in symptoms.       Plan:  {.Daily note plan.:96098}

## 2025-02-13 ENCOUNTER — TREATMENT (OUTPATIENT)
Dept: PHYSICAL THERAPY | Facility: CLINIC | Age: 75
End: 2025-02-13
Payer: MEDICARE

## 2025-02-13 ENCOUNTER — DOCUMENTATION (OUTPATIENT)
Dept: PHYSICAL THERAPY | Facility: CLINIC | Age: 75
End: 2025-02-13
Payer: MEDICARE

## 2025-02-13 DIAGNOSIS — H81.11 BENIGN PAROXYSMAL POSITIONAL VERTIGO OF RIGHT EAR: Primary | ICD-10-CM

## 2025-02-13 DIAGNOSIS — R42 VERTIGO: ICD-10-CM

## 2025-02-13 NOTE — PROGRESS NOTES
Physical Therapy                     Therapy Communication Note    Patient Name: Josh Kessler  MRN: [id number]  Today's Date: 2/13/2025     Discipline: Physical Therapy    Missed Time: Cancel    Comment: via mychart

## 2025-03-04 ENCOUNTER — PATIENT OUTREACH (OUTPATIENT)
Dept: PRIMARY CARE | Facility: CLINIC | Age: 75
End: 2025-03-04
Payer: MEDICARE

## 2025-03-31 ENCOUNTER — APPOINTMENT (OUTPATIENT)
Dept: NEUROLOGY | Facility: CLINIC | Age: 75
End: 2025-03-31
Payer: MEDICARE

## 2025-03-31 ENCOUNTER — OFFICE VISIT (OUTPATIENT)
Dept: NEUROLOGY | Facility: CLINIC | Age: 75
End: 2025-03-31
Payer: MEDICARE

## 2025-03-31 VITALS
DIASTOLIC BLOOD PRESSURE: 82 MMHG | HEIGHT: 68 IN | WEIGHT: 127.8 LBS | RESPIRATION RATE: 16 BRPM | SYSTOLIC BLOOD PRESSURE: 136 MMHG | BODY MASS INDEX: 19.37 KG/M2 | TEMPERATURE: 97.7 F | HEART RATE: 94 BPM

## 2025-03-31 DIAGNOSIS — R06.83 SNORING: ICD-10-CM

## 2025-03-31 DIAGNOSIS — R29.6 REPEATED FALLS: ICD-10-CM

## 2025-03-31 DIAGNOSIS — I63.432 CEREBRAL INFARCTION DUE TO EMBOLISM OF LEFT POSTERIOR CEREBRAL ARTERY (MULTI): Primary | ICD-10-CM

## 2025-03-31 DIAGNOSIS — R53.1 GENERALIZED WEAKNESS: ICD-10-CM

## 2025-03-31 DIAGNOSIS — R26.81 UNSTEADY GAIT: ICD-10-CM

## 2025-03-31 DIAGNOSIS — G47.19 EXCESSIVE DAYTIME SLEEPINESS: ICD-10-CM

## 2025-03-31 DIAGNOSIS — G80.0 SPASTIC QUADRIPLEGIC CEREBRAL PALSY (MULTI): ICD-10-CM

## 2025-03-31 PROCEDURE — 1159F MED LIST DOCD IN RCRD: CPT | Performed by: PSYCHIATRY & NEUROLOGY

## 2025-03-31 PROCEDURE — 1157F ADVNC CARE PLAN IN RCRD: CPT | Performed by: PSYCHIATRY & NEUROLOGY

## 2025-03-31 PROCEDURE — G8433 SCR FOR DEP NOT CPT DOC RSN: HCPCS | Performed by: PSYCHIATRY & NEUROLOGY

## 2025-03-31 PROCEDURE — 1036F TOBACCO NON-USER: CPT | Performed by: PSYCHIATRY & NEUROLOGY

## 2025-03-31 PROCEDURE — 1160F RVW MEDS BY RX/DR IN RCRD: CPT | Performed by: PSYCHIATRY & NEUROLOGY

## 2025-03-31 PROCEDURE — 99215 OFFICE O/P EST HI 40 MIN: CPT | Performed by: PSYCHIATRY & NEUROLOGY

## 2025-03-31 ASSESSMENT — ENCOUNTER SYMPTOMS
OCCASIONAL FEELINGS OF UNSTEADINESS: 1
WEAKNESS: 1
DEPRESSION: 0
LOSS OF SENSATION IN FEET: 1

## 2025-03-31 NOTE — PROGRESS NOTES
Subjective     Josh ARIEL [place] 74 y.o.  HPI  The patient was seen by me back on January 22, 2024.  At that time the patient noted that he woke up and felt as though his right leg was stuck to the floor.  The patient came to the ER and had a CT scan of the brain that showed an acute left thalamic stroke.  The patient does have a baseline of cerebral palsy but is stable in terms of this.  The patient had an MRI of the brain that showed an acute infarction in the left thalamus with additional punctate foci of acute to early subacute infarction left occipital lobe.  The MRA of the brain showed suspected occlusion of the proximal left posterior cerebral artery at the level of P2.  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  The patient had a CT angiogram of the head that showed an occlusion of the proximal P2 segment of the left posterior cerebral artery.    The patient had a cardiac monitor that showed no atrial fibrillation but did show 1 episode of nonsustained ventricular tachycardia.  The patient had an echocardiogram showed an EF of 65% and no clot was noted.  The patient's cholesterol was 171 with an LDL of 102.    In June 2024, the patient fell in the yard and went to the ER.  He had a CT scan of the brain that showed no acute process.  He also has CT scan of the cervical, thoracic and lumbar spines that showed degenerative changes but no fracture was noted.    The patient was seen in the ER this past December.  At that time the patient developed dizziness.  He described the sensation as a spinning sensation with nausea that was worsened with head movement.  The patient had a CT scan of the brain that was negative for any acute process.  The patient had an MRI of the brain that showed no acute process but did show the chronic left thalamic stroke.  The MRA of the neck and brain were stable compared to the previous MRA  of the neck and brain.  The patient had a repeat lipid panel done on 12/7/2024 that showed a cholesterol 101 and LDL of 43.  The patient feels that he was relatively dehydrated at that time.  The patient states that he has not had any further episodes of dizziness or new neurological problems.  The patient has been compliant with his aspirin and atorvastatin.  The patient is using a walker to ambulate and had a minor fall when the stool that he was sitting on while he was painting his hallway gave out.    The patient states that he was tried on muscle relaxants by his primary care doctor but felt he became much weaker and could not tolerate them.    The patient states that he snores but does not wake himself up at night.  The patient will nap at least once a day for 40 minutes.  The patient typically has lights out at 10 PM and it takes him about a minute to fall asleep.  The patient will wake for the day between 6 AM and 7 AM.    Review of Systems   Neurological:  Positive for weakness.   All other systems reviewed and are negative.       Patient Active Problem List   Diagnosis    Acute pharyngitis    Basal cell adenoma    Biceps tendinitis of both shoulders    Bilateral hearing loss    Borderline hyperlipidemia    Cerebral palsy    Closed fracture of nasal bones    Deviated nasal septum    History of Mohs micrographic surgery for skin cancer    Low back pain    Neck pain    Osteoarthritis of finger of right hand    Primary osteoarthritis of right knee    Wound, open, forehead    Protein-calorie malnutrition, unspecified severity (Multi)    Benign prostatic hyperplasia    Generalized weakness    Atherosclerotic heart disease of native coronary artery without angina pectoris    Benign neoplasm of major salivary gland, unspecified    Peripheral vascular disease, unspecified (CMS-HCC)    Personal history of transient ischemic attack (TIA), and cerebral infarction without residual deficits    Repeated falls     Sciatica, right side    Spondylolisthesis, lumbar region    Unspecified inflammatory spondylopathy, lumbar region    Dizziness    Vertigo    Benign paroxysmal positional vertigo of right ear        Past Medical History:   Diagnosis Date    Cerebral palsy     Other specified health status     No pertinent past medical history    Skin cancer     Stroke (Multi)         Past Surgical History:   Procedure Laterality Date    OTHER SURGICAL HISTORY  04/24/2018    History Of Prior Surgery        Social History     Socioeconomic History    Marital status:      Spouse name: Not on file    Number of children: Not on file    Years of education: Not on file    Highest education level: Not on file   Occupational History    Not on file   Tobacco Use    Smoking status: Never     Passive exposure: Never    Smokeless tobacco: Never   Vaping Use    Vaping status: Never Used   Substance and Sexual Activity    Alcohol use: Not Currently    Drug use: Never    Sexual activity: Not on file   Other Topics Concern    Not on file   Social History Narrative    Not on file     Social Drivers of Health     Financial Resource Strain: Low Risk  (6/20/2024)    Overall Financial Resource Strain (CARDIA)     Difficulty of Paying Living Expenses: Not hard at all   Food Insecurity: No Food Insecurity (11/9/2023)    Hunger Vital Sign     Worried About Running Out of Food in the Last Year: Never true     Ran Out of Food in the Last Year: Never true   Transportation Needs: No Transportation Needs (12/17/2024)    OASIS : Transportation     Lack of Transportation (Medical): No     Lack of Transportation (Non-Medical): No     Patient Unable or Declines to Respond: No   Physical Activity: Not on file   Stress: No Stress Concern Present (11/9/2023)    [place] [employer] - Occupational Stress Questionnaire     Feeling of Stress : Not at all   Social Connections: Feeling Socially Integrated (12/17/2024)    OASIS : Social  "Isolation     Frequency of experiencing loneliness or isolation: Never   Intimate Partner Violence: Not At Risk (11/9/2023)    Humiliation, Afraid, Rape, and Kick questionnaire     Fear of Current or Ex-Partner: No     Emotionally Abused: No     Physically Abused: No     Sexually Abused: No   Housing Stability: Low Risk  (6/20/2024)    Housing Stability Vital Sign     Unable to Pay for Housing in the Last Year: No     Number of Places Lived in the Last Year: 1     Unstable Housing in the Last Year: No        Family History   Problem Relation Name Age of Onset    Dementia Mother      Tremor Mother          Current Outpatient Medications   Medication Instructions    acetaminophen (TYLENOL) 650 mg, oral, Every 4 hours PRN    aspirin 81 mg, Daily    atorvastatin (LIPITOR) 40 mg, oral, Nightly        No Known Allergies     Objective  /82 (BP Location: Right arm)   Pulse 94   Temp 36.5 °C (97.7 °F)   Resp 16   Ht 1.727 m (5' 8\")   Wt 58 kg (127 lb 12.8 oz)   BMI 19.43 kg/m²    GENERAL APPEARANCE:  No distress, alert and cooperative.     MENTAL STATE:  Orientation was normal to time, place and person. Recent and remote memory was intact.  Attention span and concentration were normal. Language testing was normal for comprehension, repetition, expression, and naming. Calculation was intact. The patient could correctly interpret a picture, and copy a diagram. General fund of knowledge was intact. Mini-mental status examination was performed with no errors.     CRANIAL NERVES:  Cranial nerves were normal.      CN 2- Visual Acuity  OD: 20/20 (corrected)   OS: 20/20 (corrected); visual fields full to confrontation.      CN 3, 4, 6-  Pupils round, 4 mm in diameter, equally reactive to light. No ptosis. EOMs normal alignment, full range of movement, no nystagmus     CN 5- Facial sensation intact bilaterally. Normal corneal reflexes.      CN 7- Normal and symmetric facial strength. Nasolabial folds symmetric.     CN 8- " Hearing intact to finger rub, whisper.      CN 9- Palate elevates symmetrically. Normal gag reflex.      CN 11- Normal strength of shoulder shrug and neck turning      CN 12- Tongue midline, with normal bulk and strength; no fasciculations.     MOTOR: The patient has normal bulk bilaterally.  The patient has increased tone in all of his extremities with the right side being worse than the left side.  The patient has normal strength in the upper extremities bilaterally and in the lower extremities bilaterally.  The patient does have 5-/5 strength in the right lower extremity.  The patient does have a right foot orthotic on.    GAIT: The patient's station and gait are mildly spastic and unsteady and he is using a walker to ambulate.    Assessment/Plan   Plan: The patient is doing very well from a neurological standpoint.  The patient does need to continue his aspirin and atorvastatin.  I will hold on starting him on baclofen as he did not do well with muscle relaxants in the past.  I did offer him a consult with Dr. Johnson for Botox injections but he is declining at this time.  The patient does need home sleep study.  The patient needs to improve his sleep hygiene, get at least 8 hours sleep at night and avoid the supine position..  The patient should not drive while drowsy.  I discussed all these issues in detail with the patient and answered all of his questions.  The patient follow-up with me in 6 months if his home sleep study is positive for sleep apnea.  If his home sleep study is negative he can follow-up with me on an as-needed basis.

## 2025-03-31 NOTE — PATIENT INSTRUCTIONS
Plan: The patient is doing very well from a neurological standpoint.  The patient does need to continue his aspirin and atorvastatin.  I will hold on starting him on baclofen as he did not do well with muscle relaxants in the past.  I did offer him a consult with Dr. Johnson for Botox injections but he is declining at this time.  The patient does need home sleep study.  The patient needs to improve his sleep hygiene, get at least 8 hours sleep at night and avoid the supine position..  The patient should not drive while drowsy.  I discussed all these issues in detail with the patient and answered all of his questions.  The patient follow-up with me in 6 months if his home sleep study is positive for sleep apnea.  If his home sleep study is negative he can follow-up with me on an as-needed basis.

## 2025-04-01 ENCOUNTER — OFFICE VISIT (OUTPATIENT)
Dept: PRIMARY CARE | Facility: CLINIC | Age: 75
End: 2025-04-01
Payer: MEDICARE

## 2025-04-01 VITALS
TEMPERATURE: 98 F | DIASTOLIC BLOOD PRESSURE: 84 MMHG | HEART RATE: 77 BPM | HEIGHT: 68 IN | SYSTOLIC BLOOD PRESSURE: 162 MMHG | BODY MASS INDEX: 19.04 KG/M2 | OXYGEN SATURATION: 89 % | WEIGHT: 125.6 LBS

## 2025-04-01 DIAGNOSIS — E55.9 VITAMIN D DEFICIENCY, UNSPECIFIED: ICD-10-CM

## 2025-04-01 DIAGNOSIS — H81.11 BENIGN PAROXYSMAL POSITIONAL VERTIGO OF RIGHT EAR: ICD-10-CM

## 2025-04-01 DIAGNOSIS — G80.9 CEREBRAL PALSY, UNSPECIFIED TYPE (MULTI): ICD-10-CM

## 2025-04-01 DIAGNOSIS — Z86.73 PERSONAL HISTORY OF TRANSIENT ISCHEMIC ATTACK (TIA), AND CEREBRAL INFARCTION WITHOUT RESIDUAL DEFICITS: Primary | ICD-10-CM

## 2025-04-01 DIAGNOSIS — N62 GYNECOMASTIA, MALE: ICD-10-CM

## 2025-04-01 PROBLEM — M19.049 LOCALIZED, PRIMARY OSTEOARTHRITIS OF HAND: Status: ACTIVE | Noted: 2024-06-28

## 2025-04-01 PROBLEM — M51.369 DEGENERATION OF LUMBAR INTERVERTEBRAL DISC: Status: ACTIVE | Noted: 2024-06-28

## 2025-04-01 PROCEDURE — 1170F FXNL STATUS ASSESSED: CPT | Performed by: FAMILY MEDICINE

## 2025-04-01 PROCEDURE — G2211 COMPLEX E/M VISIT ADD ON: HCPCS | Performed by: FAMILY MEDICINE

## 2025-04-01 PROCEDURE — 1126F AMNT PAIN NOTED NONE PRSNT: CPT | Performed by: FAMILY MEDICINE

## 2025-04-01 PROCEDURE — 99213 OFFICE O/P EST LOW 20 MIN: CPT | Performed by: FAMILY MEDICINE

## 2025-04-01 PROCEDURE — 1157F ADVNC CARE PLAN IN RCRD: CPT | Performed by: FAMILY MEDICINE

## 2025-04-01 PROCEDURE — 1159F MED LIST DOCD IN RCRD: CPT | Performed by: FAMILY MEDICINE

## 2025-04-01 PROCEDURE — 1160F RVW MEDS BY RX/DR IN RCRD: CPT | Performed by: FAMILY MEDICINE

## 2025-04-01 PROCEDURE — 1036F TOBACCO NON-USER: CPT | Performed by: FAMILY MEDICINE

## 2025-04-01 ASSESSMENT — GERIATRIC MINI NUTRITIONAL ASSESSMENT (MNA)
D HAS SUFFERED PSYCHOLOGICAL STRESS OR ACUTE DISEASE IN THE PAST 3 MONTHS?: NO
A HAS FOOD INTAKE DECLINED OVER THE PAST 3 MONTHS DUE TO LOSS OF APPETITE, DIGESTIVE PROBLEMS, CHEWING OR SWALLOWING DIFFICULTIES?: NO DECREASE IN FOOD INTAKE
C GENERAL MOBILITY: GOES OUT
E NEUROPSYCHOLOGICAL PROBLEMS: NO PSYCHOLOGICAL PROBLEMS
B WEIGHT LOSS DURING THE LAST 3 MONTHS: NO WEIGHT LOSS

## 2025-04-01 ASSESSMENT — ACTIVITIES OF DAILY LIVING (ADL)
BATHING: INDEPENDENT
MANAGING FINANCES: INDEPENDENT
GROCERY SHOPPING: INDEPENDENT
USING TRANSPORTATION: INDEPENDENT
TOILETING: INDEPENDENT
PATIENT'S MEMORY ADEQUATE TO SAFELY COMPLETE DAILY ACTIVITIES?: YES
PREPARING MEALS: INDEPENDENT
ADEQUATE_TO_COMPLETE_ADL: YES
EATING: INDEPENDENT
DOING HOUSEWORK: INDEPENDENT
FEEDING YOURSELF: INDEPENDENT
STIL DRIVING: YES
NEEDS ASSISTANCE WITH FOOD: INDEPENDENT
GROOMING: INDEPENDENT
TAKING MEDICATION: INDEPENDENT
USING TELEPHONE: INDEPENDENT
WALKS IN HOME: INDEPENDENT
DRESSING YOURSELF: INDEPENDENT
JUDGMENT_ADEQUATE_SAFELY_COMPLETE_DAILY_ACTIVITIES: YES

## 2025-04-01 ASSESSMENT — ANXIETY QUESTIONNAIRES
1. FEELING NERVOUS, ANXIOUS, OR ON EDGE: NOT AT ALL
6. BECOMING EASILY ANNOYED OR IRRITABLE: NOT AT ALL
7. FEELING AFRAID AS IF SOMETHING AWFUL MIGHT HAPPEN: NOT AT ALL
GAD7 TOTAL SCORE: 0
3. WORRYING TOO MUCH ABOUT DIFFERENT THINGS: NOT AT ALL
4. TROUBLE RELAXING: NOT AT ALL
5. BEING SO RESTLESS THAT IT IS HARD TO SIT STILL: NOT AT ALL
2. NOT BEING ABLE TO STOP OR CONTROL WORRYING: NOT AT ALL

## 2025-04-01 ASSESSMENT — ENCOUNTER SYMPTOMS
DEPRESSION: 0
CONSTITUTIONAL NEGATIVE: 1
OCCASIONAL FEELINGS OF UNSTEADINESS: 0
LOSS OF SENSATION IN FEET: 0

## 2025-04-01 ASSESSMENT — PATIENT HEALTH QUESTIONNAIRE - PHQ9
1. LITTLE INTEREST OR PLEASURE IN DOING THINGS: NOT AT ALL
SUM OF ALL RESPONSES TO PHQ9 QUESTIONS 1 AND 2: 0
2. FEELING DOWN, DEPRESSED OR HOPELESS: NOT AT ALL

## 2025-04-01 ASSESSMENT — PAIN SCALES - GENERAL: PAINLEVEL_OUTOF10: 0-NO PAIN

## 2025-04-01 NOTE — PROGRESS NOTES
"Subjective   Patient ID: Josh Kessler is a 74 y.o. male who presents for c/o (Right chest tenderness x 3 wks).    HPI     Review of Systems   Constitutional: Negative.    Musculoskeletal:         Right rib tenderness after fall 3 weeks ago       Objective   /84 (BP Location: Right arm)   Pulse 77   Temp 36.7 °C (98 °F) (Oral)   Ht 1.727 m (5' 8\")   Wt 57 kg (125 lb 9.6 oz)   SpO2 (!) 89%   BMI 19.10 kg/m²     Physical Exam  Vitals and nursing note reviewed.   Constitutional:       Appearance: Normal appearance.   Cardiovascular:      Rate and Rhythm: Regular rhythm.      Heart sounds: Normal heart sounds.   Chest:      Comments: Gynecomastia right breast  Musculoskeletal:         General: Normal range of motion.   Neurological:      General: No focal deficit present.      Mental Status: He is alert and oriented to person, place, and time.   Psychiatric:         Mood and Affect: Mood normal.         Behavior: Behavior normal.         Assessment/Plan patient seen here with some tender and increased breast tissue on the right side worsening of her diagnostic mammogram we are also getting a vitamin D level since this was recommended by the vestibular therapist.  I will see him back as needed  Problem List Items Addressed This Visit             ICD-10-CM    Cerebral palsy G80.9    Personal history of transient ischemic attack (TIA), and cerebral infarction without residual deficits - Primary Z86.73    Benign paroxysmal positional vertigo of right ear H81.11    Relevant Orders    Vitamin D 25-Hydroxy,Total (for eval of Vitamin D levels)     Other Visit Diagnoses         Codes    Gynecomastia, male     N62    Relevant Orders    BI mammo bilateral diagnostic    Vitamin D deficiency, unspecified     E55.9    Relevant Orders    Vitamin D 25-Hydroxy,Total (for eval of Vitamin D levels)               "

## 2025-04-02 LAB — 25(OH)D3+25(OH)D2 SERPL-MCNC: 37 NG/ML (ref 30–100)

## 2025-04-07 ENCOUNTER — APPOINTMENT (OUTPATIENT)
Dept: RADIOLOGY | Facility: CLINIC | Age: 75
End: 2025-04-07
Payer: MEDICARE

## 2025-04-07 ENCOUNTER — HOSPITAL ENCOUNTER (OUTPATIENT)
Dept: RADIOLOGY | Facility: CLINIC | Age: 75
Discharge: HOME | End: 2025-04-07
Payer: MEDICARE

## 2025-04-07 DIAGNOSIS — N62 GYNECOMASTIA, MALE: ICD-10-CM

## 2025-04-07 DIAGNOSIS — N63.0 LUMP OF BREAST: ICD-10-CM

## 2025-04-07 PROCEDURE — 76642 ULTRASOUND BREAST LIMITED: CPT | Performed by: RADIOLOGY

## 2025-04-07 PROCEDURE — 77066 DX MAMMO INCL CAD BI: CPT

## 2025-04-07 PROCEDURE — 76642 ULTRASOUND BREAST LIMITED: CPT | Mod: RT

## 2025-04-07 PROCEDURE — 77066 DX MAMMO INCL CAD BI: CPT | Performed by: RADIOLOGY

## 2025-04-07 PROCEDURE — G0279 TOMOSYNTHESIS, MAMMO: HCPCS | Performed by: RADIOLOGY

## 2025-04-15 ENCOUNTER — APPOINTMENT (OUTPATIENT)
Dept: PRIMARY CARE | Facility: CLINIC | Age: 75
End: 2025-04-15
Payer: MEDICARE

## 2025-04-15 VITALS
HEIGHT: 68 IN | OXYGEN SATURATION: 97 % | HEART RATE: 79 BPM | WEIGHT: 130 LBS | BODY MASS INDEX: 19.7 KG/M2 | DIASTOLIC BLOOD PRESSURE: 79 MMHG | TEMPERATURE: 98.3 F | SYSTOLIC BLOOD PRESSURE: 153 MMHG

## 2025-04-15 DIAGNOSIS — G80.9 CEREBRAL PALSY, UNSPECIFIED TYPE (MULTI): ICD-10-CM

## 2025-04-15 DIAGNOSIS — S16.1XXA STRAIN OF NECK MUSCLE, INITIAL ENCOUNTER: Primary | ICD-10-CM

## 2025-04-15 RX ORDER — LIDOCAINE HYDROCHLORIDE 10 MG/ML
2 INJECTION, SOLUTION INFILTRATION; PERINEURAL ONCE
Status: COMPLETED | OUTPATIENT
Start: 2025-04-15 | End: 2025-04-15

## 2025-04-15 RX ADMIN — LIDOCAINE HYDROCHLORIDE 2 ML: 10 INJECTION, SOLUTION INFILTRATION; PERINEURAL at 16:58

## 2025-04-15 ASSESSMENT — PATIENT HEALTH QUESTIONNAIRE - PHQ9
SUM OF ALL RESPONSES TO PHQ9 QUESTIONS 1 AND 2: 0
2. FEELING DOWN, DEPRESSED OR HOPELESS: NOT AT ALL
1. LITTLE INTEREST OR PLEASURE IN DOING THINGS: NOT AT ALL

## 2025-04-15 ASSESSMENT — PAIN SCALES - GENERAL: PAINLEVEL_OUTOF10: 5

## 2025-04-15 ASSESSMENT — ENCOUNTER SYMPTOMS
LOSS OF SENSATION IN FEET: 0
CONSTITUTIONAL NEGATIVE: 1
NECK PAIN: 1
OCCASIONAL FEELINGS OF UNSTEADINESS: 0
NECK STIFFNESS: 1
PSYCHIATRIC NEGATIVE: 1
NEUROLOGICAL NEGATIVE: 1
DEPRESSION: 0

## 2025-04-15 NOTE — PROGRESS NOTES
"Subjective   Patient ID: Josh Kessler is a 74 y.o. male who presents for c/o (Neck problems x 2wks).    HPI     Review of Systems   Constitutional: Negative.    Musculoskeletal:  Positive for neck pain and neck stiffness.   Neurological: Negative.    Psychiatric/Behavioral: Negative.         Objective   /79 (BP Location: Left arm)   Pulse 79   Temp 36.8 °C (98.3 °F) (Oral)   Ht 1.727 m (5' 8\")   Wt 59 kg (130 lb)   SpO2 97%   BMI 19.77 kg/m²     Physical Exam  Vitals and nursing note reviewed.   Constitutional:       Appearance: Normal appearance.   Cardiovascular:      Rate and Rhythm: Regular rhythm.      Heart sounds: Normal heart sounds.   Musculoskeletal:      Comments: Trigger point tenderness left lower cervical spine   Neurological:      Mental Status: He is alert and oriented to person, place, and time.   Psychiatric:         Mood and Affect: Mood normal.         Behavior: Behavior normal.     Patient ID: Josh Kessler is a 74 y.o. male.    Procedures after sterile preparation patient was given a trigger point injection lower left cervical paravertebral muscles with Decadron and 1% lidocaine    Assessment/Plan patient seen here with some cervical strain we gave him an trigger point injection in the left side of his lower cervical paravertebral muscles.  He will let me know if this does not resolve  Problem List Items Addressed This Visit             ICD-10-CM    Cerebral palsy G80.9     Other Visit Diagnoses         Codes    Strain of neck muscle, initial encounter    -  Primary S16.1XXA    Relevant Medications    dexAMETHasone (Decadron) injection 4 mg (Start on 4/15/2025  4:45 PM)    lidocaine (Xylocaine) 10 mg/mL (1 %) injection 2 mL (Start on 4/15/2025  4:45 PM)               "

## 2025-04-30 ENCOUNTER — PROCEDURE VISIT (OUTPATIENT)
Dept: SLEEP MEDICINE | Facility: CLINIC | Age: 75
End: 2025-04-30
Payer: MEDICARE

## 2025-04-30 DIAGNOSIS — I63.432 CEREBRAL INFARCTION DUE TO EMBOLISM OF LEFT POSTERIOR CEREBRAL ARTERY (MULTI): ICD-10-CM

## 2025-04-30 NOTE — PROGRESS NOTES
Type of Study: HOME SLEEP STUDY - NOMAD     The patient received equipment and instructions for use of the [facility] HSAT device. The patient was instructed how to apply the effort belts, cannula, thermistor. It was also explained how the Nomad and oximeter components work.  The patient was asked to record their sleep for an 8-hour period.     The patient was informed of their responsibility for the device and acknowledged this by signing the HSAT device contract. The patient was asked to return the device on 5/1/2025 between the hours of 6:30 AM- 6:30 PM to the [facility].     The patient was instructed to call 911 as usual for any medical- emergencies while at home.  The patient was also given a phone number for troubleshooting when using the device in case there were additional questions.

## 2025-05-16 DIAGNOSIS — G47.33 OBSTRUCTIVE SLEEP APNEA: ICD-10-CM

## 2025-06-16 ENCOUNTER — PATIENT MESSAGE (OUTPATIENT)
Dept: NEUROLOGY | Facility: CLINIC | Age: 75
End: 2025-06-16
Payer: MEDICARE

## 2025-09-23 ENCOUNTER — APPOINTMENT (OUTPATIENT)
Dept: NEUROLOGY | Facility: CLINIC | Age: 75
End: 2025-09-23
Payer: MEDICARE

## 2025-11-13 ENCOUNTER — APPOINTMENT (OUTPATIENT)
Dept: PRIMARY CARE | Facility: CLINIC | Age: 75
End: 2025-11-13
Payer: MEDICARE